# Patient Record
Sex: MALE | Race: WHITE
[De-identification: names, ages, dates, MRNs, and addresses within clinical notes are randomized per-mention and may not be internally consistent; named-entity substitution may affect disease eponyms.]

---

## 2020-02-05 ENCOUNTER — HOSPITAL ENCOUNTER (OUTPATIENT)
Dept: HOSPITAL 88 - RAD | Age: 70
End: 2020-02-05
Attending: INTERNAL MEDICINE
Payer: MEDICARE

## 2020-02-05 DIAGNOSIS — R06.02: Primary | ICD-10-CM

## 2020-02-05 DIAGNOSIS — R91.8: ICD-10-CM

## 2020-02-05 DIAGNOSIS — J44.9: ICD-10-CM

## 2020-02-05 LAB
ALBUMIN SERPL-MCNC: 2.7 G/DL (ref 3.5–5)
ALBUMIN/GLOB SERPL: 0.7 {RATIO} (ref 0.8–2)
ALP SERPL-CCNC: 86 IU/L (ref 40–150)
ALT SERPL-CCNC: 7 IU/L (ref 0–55)
ANION GAP SERPL CALC-SCNC: 18.8 MMOL/L (ref 8–16)
BASOPHILS # BLD AUTO: 0 10*3/UL (ref 0–0.1)
BASOPHILS NFR BLD AUTO: 0.3 % (ref 0–1)
BUN SERPL-MCNC: 27 MG/DL (ref 7–26)
BUN/CREAT SERPL: 7 (ref 6–25)
CALCIUM SERPL-MCNC: 8.8 MG/DL (ref 8.4–10.2)
CHLORIDE SERPL-SCNC: 96 MMOL/L (ref 98–107)
CO2 SERPL-SCNC: 26 MMOL/L (ref 22–29)
DEPRECATED NEUTROPHILS # BLD AUTO: 9.2 10*3/UL (ref 2.1–6.9)
EGFRCR SERPLBLD CKD-EPI 2021: 16 ML/MIN (ref 60–?)
EOSINOPHIL # BLD AUTO: 0.2 10*3/UL (ref 0–0.4)
EOSINOPHIL NFR BLD AUTO: 1.8 % (ref 0–6)
ERYTHROCYTE [DISTWIDTH] IN CORD BLOOD: 17.8 % (ref 11.7–14.4)
GLOBULIN PLAS-MCNC: 4.1 G/DL (ref 2.3–3.5)
GLUCOSE SERPLBLD-MCNC: 101 MG/DL (ref 74–118)
HCT VFR BLD AUTO: 31.7 % (ref 38.2–49.6)
HGB BLD-MCNC: 9.8 G/DL (ref 14–18)
LYMPHOCYTES # BLD: 2.1 10*3/UL (ref 1–3.2)
LYMPHOCYTES NFR BLD AUTO: 16.1 % (ref 18–39.1)
MCH RBC QN AUTO: 28.6 PG (ref 28–32)
MCHC RBC AUTO-ENTMCNC: 30.9 G/DL (ref 31–35)
MCV RBC AUTO: 92.4 FL (ref 81–99)
MONOCYTES # BLD AUTO: 1.3 10*3/UL (ref 0.2–0.8)
MONOCYTES NFR BLD AUTO: 9.8 % (ref 4.4–11.3)
NEUTS SEG NFR BLD AUTO: 70.8 % (ref 38.7–80)
PLATELET # BLD AUTO: 259 X10E3/UL (ref 140–360)
POTASSIUM SERPL-SCNC: 2.8 MMOL/L (ref 3.5–5.1)
RBC # BLD AUTO: 3.43 X10E6/UL (ref 4.3–5.7)
SODIUM SERPL-SCNC: 138 MMOL/L (ref 136–145)

## 2020-02-05 PROCEDURE — 80053 COMPREHEN METABOLIC PANEL: CPT

## 2020-02-05 PROCEDURE — 71046 X-RAY EXAM CHEST 2 VIEWS: CPT

## 2020-02-05 PROCEDURE — 86039 ANTINUCLEAR ANTIBODIES (ANA): CPT

## 2020-02-05 PROCEDURE — 36415 COLL VENOUS BLD VENIPUNCTURE: CPT

## 2020-02-05 PROCEDURE — 86021 WBC ANTIBODY IDENTIFICATION: CPT

## 2020-02-05 PROCEDURE — 85025 COMPLETE CBC W/AUTO DIFF WBC: CPT

## 2020-02-05 NOTE — DIAGNOSTIC IMAGING REPORT
Chest, 2 views,  2/5/2020.   

 



History: Shortness of breath, COPD.



Comparison: None available.



Findings: The cardiomediastinal silhouette and pulmonary vasculature are mildly

prominent. There is biapical pleural thickening with bilateral upper lobe

irregular linear opacities. Linear opacities are also present at the lung

bases. There is no evidence of pleural effusion. Right IJ tunneled dialysis

catheter is present. There are no acute osseous or soft tissue abnormalities. 



Impression: 

Mild cardiomegaly and vascular congestion. Bilateral upper lobe scarring and

bibasilar atelectasis are present.



Signed by: Vic Herndon on 2/5/2020 5:30 PM

## 2020-02-19 ENCOUNTER — HOSPITAL ENCOUNTER (OUTPATIENT)
Dept: HOSPITAL 88 - DX | Age: 70
End: 2020-02-19
Attending: INTERNAL MEDICINE
Payer: MEDICARE

## 2020-02-19 DIAGNOSIS — J44.9: ICD-10-CM

## 2020-02-19 DIAGNOSIS — R06.02: Primary | ICD-10-CM

## 2020-02-19 DIAGNOSIS — R47.02: ICD-10-CM

## 2020-02-19 LAB
ALBUMIN SERPL-MCNC: 2.8 G/DL (ref 3.5–5)
ALBUMIN/GLOB SERPL: 0.8 {RATIO} (ref 0.8–2)
ALP SERPL-CCNC: 86 IU/L (ref 40–150)
ALT SERPL-CCNC: 13 IU/L (ref 0–55)
ANION GAP SERPL CALC-SCNC: 14.3 MMOL/L (ref 8–16)
BASOPHILS # BLD AUTO: 0.1 10*3/UL (ref 0–0.1)
BASOPHILS NFR BLD AUTO: 0.7 % (ref 0–1)
BUN SERPL-MCNC: 29 MG/DL (ref 7–26)
BUN/CREAT SERPL: 12 (ref 6–25)
CALCIUM SERPL-MCNC: 9.4 MG/DL (ref 8.4–10.2)
CHLORIDE SERPL-SCNC: 101 MMOL/L (ref 98–107)
CO2 SERPL-SCNC: 31 MMOL/L (ref 22–29)
DEPRECATED NEUTROPHILS # BLD AUTO: 8 10*3/UL (ref 2.1–6.9)
EGFRCR SERPLBLD CKD-EPI 2021: 27 ML/MIN (ref 60–?)
EOSINOPHIL # BLD AUTO: 0.6 10*3/UL (ref 0–0.4)
EOSINOPHIL NFR BLD AUTO: 5.6 % (ref 0–6)
ERYTHROCYTE [DISTWIDTH] IN CORD BLOOD: 17.7 % (ref 11.7–14.4)
GLOBULIN PLAS-MCNC: 3.6 G/DL (ref 2.3–3.5)
GLUCOSE SERPLBLD-MCNC: 84 MG/DL (ref 74–118)
HCT VFR BLD AUTO: 29 % (ref 38.2–49.6)
HGB BLD-MCNC: 8.7 G/DL (ref 14–18)
LYMPHOCYTES # BLD: 1.3 10*3/UL (ref 1–3.2)
LYMPHOCYTES NFR BLD AUTO: 11.4 % (ref 18–39.1)
MCH RBC QN AUTO: 28.4 PG (ref 28–32)
MCHC RBC AUTO-ENTMCNC: 30 G/DL (ref 31–35)
MCV RBC AUTO: 94.8 FL (ref 81–99)
MONOCYTES # BLD AUTO: 1.3 10*3/UL (ref 0.2–0.8)
MONOCYTES NFR BLD AUTO: 11.4 % (ref 4.4–11.3)
NEUTS SEG NFR BLD AUTO: 69.8 % (ref 38.7–80)
PLATELET # BLD AUTO: 362 X10E3/UL (ref 140–360)
POTASSIUM SERPL-SCNC: 3.3 MMOL/L (ref 3.5–5.1)
RBC # BLD AUTO: 3.06 X10E6/UL (ref 4.3–5.7)
SODIUM SERPL-SCNC: 143 MMOL/L (ref 136–145)

## 2020-02-19 PROCEDURE — 80053 COMPREHEN METABOLIC PANEL: CPT

## 2020-02-19 PROCEDURE — 86039 ANTINUCLEAR ANTIBODIES (ANA): CPT

## 2020-02-19 PROCEDURE — 85025 COMPLETE CBC W/AUTO DIFF WBC: CPT

## 2020-02-19 PROCEDURE — 71046 X-RAY EXAM CHEST 2 VIEWS: CPT

## 2020-02-19 PROCEDURE — 36415 COLL VENOUS BLD VENIPUNCTURE: CPT

## 2020-02-19 PROCEDURE — 86021 WBC ANTIBODY IDENTIFICATION: CPT

## 2020-02-19 PROCEDURE — 74230 X-RAY XM SWLNG FUNCJ C+: CPT

## 2020-02-19 NOTE — DIAGNOSTIC IMAGING REPORT
EXAM:  MODIFIED BA. SWALLOW



DATE: 2/19/2020 11:37 AM  



Indication: Dysphasia



Fluoroscopy Time: 2.4 min.

Reference Air Kerma (Ka, r): 5.97 mGy.



COMPARISON: None



IMPRESSION:



Modified barium small was performed by the speech pathologist. The radiologist

was not present for the examination. Provided images demonstrate no evidence

for subglottic tracheal aspiration. Please refer to speech pathology report for

further details.



Signed by: Dr. Ry Madrid MD on 2/19/2020 2:21 PM

## 2020-02-19 NOTE — DIAGNOSTIC IMAGING REPORT
EXAM:  CHEST 2 VIEWS



DATE: 2/19/2020 11:37 AM  



INDICATION: Shortness breath, COPD 



COMPARISON: 2/5/2020



FINDINGS:

Right IJ tunnel dialysis catheter identified in stable position. 



Trachea is midline. Again identified is biapical pleural thickening/scarring.

There are stable appearing linear opacities identified within the lower lung

zones which may reflect atelectasis/scarring. There is no evidence for new

large focal consolidation, pneumothorax, or significant pleural effusion.



The cardiomediastinal remains prominent. Mediastinal contours are unremarkable.

No acute osseous abnormality is identified.





IMPRESSION:



No significant interval change identified from 2/5/2020.



Signed by: Dr. Ry Madrid MD on 2/19/2020 12:17 PM

## 2020-02-28 ENCOUNTER — HOSPITAL ENCOUNTER (OUTPATIENT)
Dept: HOSPITAL 88 - CT | Age: 70
End: 2020-02-28
Attending: INTERNAL MEDICINE
Payer: MEDICARE

## 2020-02-28 DIAGNOSIS — J18.9: Primary | ICD-10-CM

## 2020-02-28 PROCEDURE — 71250 CT THORAX DX C-: CPT

## 2020-03-02 NOTE — DIAGNOSTIC IMAGING REPORT
EXAM: CT Chest WITHOUT contrast

INDICATION:      

^[id number]

^[id number]

^PNEUMONIA  

COMPARISON: X-ray 2/19/2020



TECHNIQUE:

Chest was scanned utilizing a multidetector helical scanner from the lung apex

through the level of the adrenal glands without administration of IV contrast.

Absence of intravenous contrast decreases sensitivity for detection of

lymphadenopathy and vascular pathology. Coronal and sagittal reformations were

obtained. Routine protocol was performed.

     IV CONTRAST: None



COMPLICATIONS: None   



RADIATION DOSE: 

     Total DLP: 367 mGy*cm

     Estimated effective dose: (DLP x 0.014 x size factor) mSv

     CTDIvol has been reviewed. It is below the limits set by the Radiation

Protocol Committee (RPC).

     Dose modulation, iterative reconstruction, and/or weight based adjustment

of the mA/kV was utilized to reduce the radiation dose to as low as reasonably

achievable. 

           

FINDINGS:



LINES/ TUBES: Right internal jugular vein tunneled hemodialysis catheter

terminates in the right atrium.



LUNGS AND AIRWAYS: Advanced upper lobe predominant centrilobular emphysema.

Superimposed patchy mixed interstitial and alveolar opacities throughout the

upper lobes. Right lower lobe irregular nodular opacity with central cavitation

and/or bronchiectasis that measures up to 2.3 x 2.2 cm (image 99). Few small

calcified granulomas. Mild central bronchiectasis.



PLEURA: The pleural spaces are clear.



HEART AND MEDIASTINUM: Mild cardiomegaly. No pericardial effusion. Prominent

nonspecific mediastinal lymph nodes measure up to 1 cm in short axis. The noble

are suboptimally evaluated. Prominent bilateral axillary lymph nodes.

Dilatation of the right heart chambers.



UPPER ABDOMEN: Suboptimally evaluated due to lack of intravenous contrast.

Nonspecific hypodensities of the hepatic dome. 



BONES: Partially healed anterior right rib fractures.



SOFT TISSUES: Mild gynecomastia.



IMPRESSION:

1. Advanced pulmonary emphysema with upper lobe predominant airspace disease

suggestive of superimposed pneumonia.



2. Indeterminate right lower lobe 2.3 x 2.2 cm masslike opacity. Differential

considerations include neoplasm (such as squamous cell carcinoma) and

infection. Per Fleischner Society recommendations, further evaluation may be

performed with short-term CT follow-up, PET/CT, and/or percutaneous biopsy.



Signed by: Jaden Stephen MD on 3/2/2020 9:21 AM

## 2020-05-14 NOTE — DIAGNOSTIC IMAGING REPORT
EXAMINATION:  CHEST 2 VIEWS    



INDICATION: Pre-operative



COMPARISON: Chest CT 2/28/2020

     

FINDINGS:



LINES/TUBES:Right IJ tunneled hemodialysis catheter terminates just beyond the

superior cavoatrial junction.



LUNGS:The lungs are hyperinflated. Diffuse emphysematous changes and upper lung

scarring. Nodule seen on prior chest CT are beyond the resolution of this

radiograph. No focal pneumonia.



PLEURA:No pleural effusion or pneumothorax.



MEDIASTINUM:The cardiomediastinal silhouette appears normal in size and shape.

Atherosclerotic calcifications of the thoracic aorta.



BONES/SOFT TISSUES:No acute osseous injury.



ABDOMEN:No free air under the diaphragm.





IMPRESSION: 

Hyperinflated lungs with emphysematous changes and scarring. No focal pneumonia

or pulmonary edema.



Signed by: Thai Borges MD on 5/14/2020 11:17 AM

## 2020-05-18 ENCOUNTER — HOSPITAL ENCOUNTER (OUTPATIENT)
Dept: HOSPITAL 88 - OR | Age: 70
Discharge: HOME | End: 2020-05-18
Attending: THORACIC SURGERY (CARDIOTHORACIC VASCULAR SURGERY)
Payer: MEDICARE

## 2020-05-18 VITALS — DIASTOLIC BLOOD PRESSURE: 75 MMHG | SYSTOLIC BLOOD PRESSURE: 114 MMHG

## 2020-05-18 DIAGNOSIS — Z11.59: ICD-10-CM

## 2020-05-18 DIAGNOSIS — Z01.812: ICD-10-CM

## 2020-05-18 DIAGNOSIS — M54.9: ICD-10-CM

## 2020-05-18 DIAGNOSIS — I42.9: ICD-10-CM

## 2020-05-18 DIAGNOSIS — I12.0: Primary | ICD-10-CM

## 2020-05-18 DIAGNOSIS — Z86.73: ICD-10-CM

## 2020-05-18 DIAGNOSIS — Z99.2: ICD-10-CM

## 2020-05-18 DIAGNOSIS — I25.10: ICD-10-CM

## 2020-05-18 DIAGNOSIS — Z01.810: ICD-10-CM

## 2020-05-18 DIAGNOSIS — J44.9: ICD-10-CM

## 2020-05-18 DIAGNOSIS — K21.9: ICD-10-CM

## 2020-05-18 DIAGNOSIS — N18.6: ICD-10-CM

## 2020-05-18 DIAGNOSIS — Z87.01: ICD-10-CM

## 2020-05-18 DIAGNOSIS — Z87.891: ICD-10-CM

## 2020-05-18 LAB
ANION GAP SERPL CALC-SCNC: 14.1 MMOL/L (ref 8–16)
BASOPHILS # BLD AUTO: 0.1 10*3/UL (ref 0–0.1)
BASOPHILS NFR BLD AUTO: 0.4 % (ref 0–1)
BUN SERPL-MCNC: 53 MG/DL (ref 7–26)
BUN/CREAT SERPL: 11 (ref 6–25)
CALCIUM SERPL-MCNC: 8.7 MG/DL (ref 8.4–10.2)
CHLORIDE SERPL-SCNC: 104 MMOL/L (ref 98–107)
CO2 SERPL-SCNC: 22 MMOL/L (ref 22–29)
DEPRECATED APTT PLAS QN: 32.1 SECONDS (ref 23.8–35.5)
DEPRECATED INR PLAS: 0.87
DEPRECATED NEUTROPHILS # BLD AUTO: 9.5 10*3/UL (ref 2.1–6.9)
EGFRCR SERPLBLD CKD-EPI 2021: 12 ML/MIN (ref 60–?)
EOSINOPHIL # BLD AUTO: 0.3 10*3/UL (ref 0–0.4)
EOSINOPHIL NFR BLD AUTO: 1.9 % (ref 0–6)
ERYTHROCYTE [DISTWIDTH] IN CORD BLOOD: 14 % (ref 11.7–14.4)
GLUCOSE SERPLBLD-MCNC: 84 MG/DL (ref 74–118)
HCT VFR BLD AUTO: 39.1 % (ref 38.2–49.6)
HGB BLD-MCNC: 12.5 G/DL (ref 14–18)
LYMPHOCYTES # BLD: 2.1 10*3/UL (ref 1–3.2)
LYMPHOCYTES NFR BLD AUTO: 15.8 % (ref 18–39.1)
MCH RBC QN AUTO: 31.3 PG (ref 28–32)
MCHC RBC AUTO-ENTMCNC: 32 G/DL (ref 31–35)
MCV RBC AUTO: 98 FL (ref 81–99)
MONOCYTES # BLD AUTO: 1.4 10*3/UL (ref 0.2–0.8)
MONOCYTES NFR BLD AUTO: 10.2 % (ref 4.4–11.3)
NEUTS SEG NFR BLD AUTO: 70.4 % (ref 38.7–80)
PLATELET # BLD AUTO: 291 X10E3/UL (ref 140–360)
POTASSIUM SERPL-SCNC: 4.1 MMOL/L (ref 3.5–5.1)
PROTHROMBIN TIME: 12.3 SECONDS (ref 11.9–14.5)
RBC # BLD AUTO: 3.99 X10E6/UL (ref 4.3–5.7)
SODIUM SERPL-SCNC: 136 MMOL/L (ref 136–145)

## 2020-05-18 PROCEDURE — 36415 COLL VENOUS BLD VENIPUNCTURE: CPT

## 2020-05-18 PROCEDURE — 85610 PROTHROMBIN TIME: CPT

## 2020-05-18 PROCEDURE — 85730 THROMBOPLASTIN TIME PARTIAL: CPT

## 2020-05-18 PROCEDURE — 36819 AV FUSE UPPR ARM BASILIC: CPT

## 2020-05-18 PROCEDURE — 85025 COMPLETE CBC W/AUTO DIFF WBC: CPT

## 2020-05-18 PROCEDURE — 71046 X-RAY EXAM CHEST 2 VIEWS: CPT

## 2020-05-18 PROCEDURE — 87635 SARS-COV-2 COVID-19 AMP PRB: CPT

## 2020-05-18 PROCEDURE — 80048 BASIC METABOLIC PNL TOTAL CA: CPT

## 2020-05-18 NOTE — XMS REPORT
Patient Summary Document

                             Created on: 2020



TY PORTER

External Reference #: [id number]

: 1950

Sex: Male



Demographics





                          Address                   [address] [place]  [address]

 

                          Home Phone                [phone]

 

                          Preferred Language        English

 

                          Marital Status            Unknown

 

                          Worship Affiliation     Unknown

 

                          Race                      Unknown

 

                                        Additional Race(s)  

 

                          Ethnic Group              Unknown





Author





                          Author                    [facility]

t

 

                          Organization              [facility]

 

                          Address                   [address]

[place]  [address]



 

                          Phone                     Unavailable







Support





                Name            Relationship    Address         Phone

 

                    KD PORTER     PRS                 [address] [place], [place]  [id number] [phone]

 

                    NONE,  OTHER        PRS                 [address] [place]  [address] [phone]







Care Team Providers





                    Care Team Member Name Role                Phone

 

                    MATEO ALMONTE     Attphys             Unavailable

 

                    THEE ANTONY       Attyuan             Unavailable







Payers





           Payer Name Policy Type Policy Number Effective Date Expiration Date S

ource







Problems

This patient has no known problems.



Allergies, Adverse Reactions, Alerts





        Allergy Name Allergy Type Status  Severity Reaction(s) Onset Date Inacti

ve Date 

Treating Clinician        Comments                  Source

 

       azithromycin DA     Active             2020 00:00:00             

         [facility]

 

       chlorhexidine DA     Active             2020 00:00:00            

          [facility]

 

       azithromycin DA     Active             2019 00:00:00             

         [facility]

 

       chlorhexidine DA     Active             2019 00:00:00            

          [facility]

 

       chlorhexidine DA     Active MO            2019 00:00:00            

          [facility]

 

       azithromycin DA     Active             2019-10-26 00:00:00             

         [facility]

 

       azithromycin DA     Active             2019 00:00:00             

         [facility]

 

       No Known Allergies DA     Active U             2019 00:00:00       

               [facility]

 

       No Known Allergies DA     Active U             2013 00:00:00       

               [facility]







Medications

This patient has no known medications.



Procedures

This patient has no known procedures.



Results





           Test Description Test Time  Test Comments Results    Result Comments 

Source

 

                CHEST 2 VIEWS   2020 11:15:00                             

                                

                                         [facility]      
                 [address], [place] [address]      
Patient Name: TY PORTER                                MR #: [id number]     
            : 1950                                   Age/Sex: 69/M  
Acct #: [id number]                              Req #: [id number]  Adm 
Physician:                                                      Ordered by: 
MATEO ALMONTE MD                         Report #: [id number]        Location: 
OR                                      Room/Bed:                   
________________________________________________________________________________

___________________    Procedure: [id number] DX/CHEST 2 VIEWS  Exam Date: 
20                            Exam Time: 904                             
                REPORT STATUS: Signed    EXAMINATION:  CHEST 2 VIEWS          
INDICATION: Pre-operative      COMPARISON: Chest CT 2020           
FINDINGS:      LINES/TUBES:Right IJ tunneled hemodialysis catheter terminates 
just beyond the   superior cavoatrial junction.      LUNGS:The lungs are 
hyperinflated. Diffuse emphysematous changes and upper lung   scarring. Nodule 
seen on prior chest CT are beyond the resolution of this   radiograph. No focal 
pneumonia.      PLEURA:No pleural effusion or pneumothorax.      MEDIASTINUM:The
cardiomediastinal silhouette appears normal in size and shape.   Atherosclerotic
calcifications of the thoracic aorta.      BONES/SOFT TISSUES:No acute osseous 
injury.      ABDOMEN:No free air under the diaphragm.         IMPRESSION:    
Hyperinflated lungs with emphysematous changes and scarring. No focal pneumonia 
 or pulmonary edema.      Signed by: Saida Mayers MD on 2020 11:17 AM       
Dictated By: SAIDA MAYERS MD  Electronically Signed By: SAIDA MAYERS MD on 20  Transcribed By: RIKA on 20       COPY TO:   MATEO ALMONTE MD
                                                     

 

                CT CHEST WO     2020 09:13:00                             

                                  

                                       [place] [address]      
Patient Name: TY PORTER                                   MR #: [id number]  
                  : 1950                                   Age/Sex: 
69/M  Acct #: [id number]                              Req #: [id number]  [facility] 
Physician:                                                      Ordered by: 
THEE ANTONY MD                            Report #: [id number]        Location: 
CT                                      Room/Bed:                     
________________________________________________________________________________

___________________    Procedure: [id number] CT/CT CHEST WO  Exam Date: 20 
                          Exam Time:                                        
      REPORT STATUS: Signed    EXAM: CT Chest WITHOUT contrast   INDICATION:    
     2020    PNEUMONIA     COMPARISON: X-ray 2020      
TECHNIQUE:   Chest was scanned utilizing a multidetector helical scanner from 
the lung apex   through the level of the adrenal glands without administration 
of IV contrast.   Absence of intravenous contrast decreases sensitivity for 
detection of   lymphadenopathy and vascular pathology. Coronal and sagittal 
reformations were   obtained. Routine protocol was performed.        IV 
CONTRAST: None      COMPLICATIONS: None         RADIATION DOSE:         Total 
DLP: 367 mGy*cm        Estimated effective dose: (DLP x 0.014 x size factor) mSv
       CTDIvol has been reviewed. It is below the limits set by the Radiation   
Protocol Committee (RPC).        Dose modulation, iterative reconstruction, 
and/or weight based adjustment   of the mA/kV was utilized to reduce the 
radiation dose to as low as reasonably   achievable.                  FINDINGS: 
    LINES/ TUBES: Right internal jugular vein tunneled hemodialysis catheter   
terminates in the right atrium.      LUNGS AND AIRWAYS: Advanced upper lobe 
predominant centrilobular emphysema.   Superimposed patchy mixed interstitial 
and alveolar opacities throughout the   upper lobes. Right lower lobe irregular 
nodular opacity with central cavitation   and/or bronchiectasis that measures up
to 2.3 x 2.2 cm (image 99). Few small   calcified granulomas. Mild central 
bronchiectasis.      PLEURA: The pleural spaces are clear.      HEART AND 
MEDIASTINUM: Mild cardiomegaly. No pericardial effusion. Prominent   nonspecific
mediastinal lymph nodes measure up to 1 cm in short axis. The noble   are 
suboptimally evaluated. Prominent bilateral axillary lymph nodes.   Dilatation 
of the right heart chambers.      UPPER ABDOMEN: Suboptimally evaluated due to 
lack of intravenous contrast.   Nonspecific hypodensities of the hepatic dome.  
    BONES: Partially healed anterior right rib fractures.      SOFT TISSUES: 
Mild gynecomastia.      IMPRESSION:   1. Advanced pulmonary emphysema with upper
lobe predominant airspace disease   suggestive of superimposed pneumonia.      
2. Indeterminate right lower lobe 2.3 x 2.2 cm masslike opacity. Differential   
considerations include neoplasm (such as squamous cell carcinoma) and   
infection. Per Fleischner Society recommendations, further evaluation may be   
performed with short-term CT follow-up, PET/CT, and/or percutaneous biopsy.     
Signed by: Jaden Covarrubias MD on 3/2/2020 9:21 AM        Dictated By: JADEN COVARRUBIAS MD  Electronically Signed By: JADEN COVARRUBIAS MD on 20  
Transcribed By: RIKA on 20       COPY TO:   THEE ANTONY MD BA. SWALLOW 2020 14:19:00                        

                              

                                                [place] [address]      Patient Name: TY PORTER                                   MR 
#: [id number]                     : 1950                               
   Age/Sex: 69/M  Acct #: [id number]                              Req #: [id number]
[id number]  Adm Physician:                                                      
Ordered by: THEE ANTONY MD                            Report #: [id number]      
 Location: DX                                      Room/Bed:                    
 
________________________________________________________________________________

___________________    Procedure: [id number] DX/MODIFIED BA. SWALLOW  Exam Date: 
                           Exam Time:                                           
   REPORT STATUS: Signed    EXAM:  MODIFIED BA. SWALLOW      DATE: 2020 
11:37 AM        Indication: Dysphasia      Fluoroscopy Time: 2.4 min.   
Reference Air Kerma (Ka, r): 5.97 mGy.      COMPARISON: None      IMPRESSION:   
  Modified barium small was performed by the speech pathologist. The radiologist
  was not present for the examination. Provided images demonstrate no evidence  
for subglottic tracheal aspiration. Please refer to speech pathology report for 
 further details.      Signed by: Dr. Ry Madrid MD on 2020 2:21 PM     
  Dictated By: RY MADRID MD  Electronically Signed By: RY MADRID MD on 
20 1421  Transcribed By: RIKA on 20 1421       COPY TO:   THEE QUIJANO MD                                        

 

                CHEST 2 VIEWS   2020 12:15:00                             

                                

                                         [place] [address]      
Patient Name: TY PORTER                                   MR #: [id number]    
                : 1950                                   Age/Sex: 69/M 
Acct #: [id number]                              Req #: [id number]  Adm 
Physician:                                                      Ordered by: 
THEE ANTONY MD                            Report #: [id number]        Location: 
DX                                      Room/Bed:                     
________________________________________________________________________________

___________________    Procedure: [id number] DX/CHEST 2 VIEWS  Exam Date: 
20                            Exam Time: 1135                             
                REPORT STATUS: Signed    EXAM:  CHEST 2 VIEWS      DATE: 
2020 11:37 AM        INDICATION: Shortness breath, COPD       COMPARISON: 
2020      FINDINGS:   Right IJ tunnel dialysis catheter identified in stable
position.       Trachea is midline. Again identified is biapical pleural thick
ening/scarring.   There are stable appearing linear opacities identified within 
the lower lung   zones which may reflect atelectasis/scarring. There is no 
evidence for new   large focal consolidation, pneumothorax, or significant 
pleural effusion.      The cardiomediastinal remains prominent. Mediastinal 
contours are unremarkable.   No acute osseous abnormality is identified.        
IMPRESSION:      No significant interval change identified from 2020.      
Signed by: Dr. Ry Madrid MD on 2020 12:17 PM        Dictated By: RY MADRID MD  Electronically Signed By: RY MADRID MD on 20  
Transcribed By: RIKA on 20       COPY TO:   THEE ANTONY MD        
                                                     

 

                CHEST 2 VIEWS   2020 17:27:00                             

                                

                                         [place] [address]      
Patient Name: TY PORTER                                   MR #: [id number]    
                : 1950                                   Age/Sex: 69/M 
Acct #: [id number]                              Req #: [id number]  Adm 
Physician:                                                      Ordered by: 
THEE ANTONY MD                            Report #: [id number]        Location: 
[facility]                                     Room/Bed:                     
________________________________________________________________________________

___________________    Procedure: [id number] DX/CHEST 2 VIEWS  Exam Date: 
20                            Exam Time: 1635                             
                REPORT STATUS: Signed    Chest, 2 views,  2020.             
History: Shortness of breath, COPD.      Comparison: None available.      
Findings: The cardiomediastinal silhouette and pulmonary vasculature are mildly 
 prominent. There is biapical pleural thickening with bilateral upper lobe   
irregular linear opacities. Linear opacities are also present at the lung   
bases. There is no evidence of pleural effusion. Right IJ tunneled dialysis   
catheter is present. There are no acute osseous or soft tissue abnormalities.   
   Impression:    Mild cardiomegaly and vascular congestion. Bilateral upper 
lobe scarring and   bibasilar atelectasis are present.      Signed by: Vic Dunn on 2020 5:30 PM        Dictated By: VIC DUNN MD  Electronically 
Signed By: VIC DUNN MD on 20  Transcribed By: RIKA on 
20       COPY TO:   THEE ANTONY MD                                   

  

 

                    COMPREHENSIVE METABOLIC PANEL 2020 08:09:00   

 

                                        Test Item

 

             SODIUM (test code = NA) 140 mEq/L    134-147      N             

 

             POTASSIUM (test code = K) 3.4 mEq/L    3.4-5.0      N             

 

             CHLORIDE (test code = CL) 104 mEq/L    100-108      N             

 

             CARBON DIOXIDE (test code = CO2) 30 mEq/L     21-33        N       

      

 

             ANION GAP (test code = GAP) 9            0-20         N            

 

 

             GLUCOSE (test code = GLU) 88 mg/dL            N             

 

             BLOOD UREA NITROGEN (test code = BUN) 16 mg/dL     7-18         N  

           

 

             GLOMERULAR FILTRATION RATE (test code = GFR) 20.8         70-80    

    L            Units of measure = 

ml/min/1.73 m2

 

             CREATININE (test code = CREAT) 3.0 mg/dL    0.6-1.3      H         

    

 

             TOTAL PROTEIN (test code = PROT) 5.7 g/dL     6.4-8.2      L       

      

 

             ALBUMIN (test code = ALB) 2.60 g/dL    3.4-5.0      L             

 

             CALCIUM (test code = CA) 8.6 mg/dL    8.0-10.5     N             

 

             BILIRUBIN TOTAL (test code = BILT) 0.4 MG/DL    <1.5         N     

        

 

             SGOT/AST (test code = AST) 6 IUnit/L    15-37        L             

 

             SGPT/ALT (test code = ALT) 10 IUnit/L   15-65        L             

 

             ALKALINE PHOSPHATASE TOTAL (test code = ALKP) 55 IUnit/L     

     N             





CBC W/AUTO [id number] 08:01:00* 



             Test Item    Value        Reference Range Interpretation Comments

 

             WHITE BLOOD CELL (test code = WBC) 12.89 x10 3/uL 4.5-11.0     H   

          

 

             RED BLOOD CELL (test code = RBC) 3.10 x10 6/uL 4.00-5.60    L      

       

 

             HEMOGLOBIN (test code = HGB) 8.9 g/dL     12.5-16.9    L           

  

 

             HEMATOCRIT (test code = HCT) 29.9 %       37.5-50.7    L           

  

 

             MEAN CELL VOLUME (test code = MCV) 96.5 fL      81.0-99.0    N     

        

 

             MEAN CELL HGB (test code = MCH) 28.7 pg      27.0-33.0    N        

     

 

             MEAN CELL HGB CONCETRATION (test code = MCHC) 29.8 g/dL    33.0-37.

0    L             

 

             RED CELL DISTRIBUTION WIDTH CV (test code = RDW) 17.2 %       11.5-

14.5    H             

 

             RED CELL DISTRIBUTION WIDTH SD (test code = RDW-SD) 59.3 fL      37

.0-54.0    H             

 

             PLATELET COUNT (test code = PLT) 187 x10 3/uL 150-400      N       

      

 

             MEAN PLATELET VOLUME (test code = MPV) 10.0 fL      7.0-9.0      H 

            

 

             NEUTROPHIL % (test code = NT%) 53.4 %       56.0-77.0    L         

    

 

             IMMATURE GRANULOCYTE % (test code = IG%) 1.1 %        0.0-2.0      

N             

 

             LYMPHOCYTE % (test code = LY%) 23.0 %       14.0-32.0    N         

    

 

             MONOCYTE % (test code = MO%) 11.4 %       4.8-9.0      H           

  

 

             EOSINOPHIL % (test code = EO%) 10.2 %       0.3-3.7      H         

    

 

             BASOPHIL % (test code = BA%) 0.9 %        0.0-2.0      N           

  

 

             NUCLEATED RBC % (test code = NRBC%) 0.0 %        0-0          N    

         

 

             NEUTROPHIL # (test code = NT#) 6.90 x10 3/uL 2.0-7.6      N        

     

 

             IMMATURE GRANULOCYTE # (test code = IG#) 0.14 x10 3/uL 0.00-0.03   

 H             

 

             LYMPHOCYTE # (test code = LY#) 2.96 x10 3/uL 1.0-3.8      N        

     

 

             MONOCYTE # (test code = MO#) 1.47 x10 3/uL 0.1-0.8      H          

   

 

             EOSINOPHIL # (test code = EO#) 1.31 x10 3/uL 0.0-0.2      H        

     

 

             BASOPHIL # (test code = BA#) 0.11 x10 3/uL 0.0-0.2      N          

   

 

             NUCLEATED RBC # (test code = NRBC#) 0.00 x10 3/uL 0.0-0.1      N   

          

 

             MANUAL DIFF REQUIRED (test code = MDIFF) NO                        

              





ARTERIAL BLOOD [id number] 15:13:00* 



             Test Item    Value        Reference Range Interpretation Comments

 

             ARTERIAL BLOOD GAS PH (test code = PHA) 7.324        7.35-7.45    L

             

 

             ARTERIAL BLOOD GAS PCO2 (test code = PCO2A) 54.9 mmHg    35-45     

   H             

 

             ARTERIAL BLOOD GAS PO2 (test code = PO2A) 413 mmHg           

 H             

 

             BICARBONATE TOTAL HCO3 (test code = HCO3) 28.5 mmol/L  22.0-26.0   

 H             

 

             BASE EXCESS (test code = URIEL) 2.0 mmol/L   -4-4         N          

   

 

             ABG O2 SATURATION (test code = SATA) 100 %               N   

          

 

             FIO2 (test code = FIO2A) 100 %                                   

 

             ABG DELIVERY (test code = MATTY) Vent                               

     

 

             ABG VENT MODE (test code = MODEA) AC v con                         

       

 

             ABG VENT RESP RATE (test code = RRA) 12 /MIN                       

          

 

             ABG TIDAL VOLUME (test code = TVA) 600 ml                          

        

 

             ABG PRESSURE SUPPORT (test code = PSABG) 10 cmH2O                  

             Performed by certified 

 at  [facility]

 

             ABG TEMPERATURE (test code = TEMPA) 98.6 F                         

         

 

             ABG SITE (test code = SITEA) Art line                              

  

 

             PREDICTED AA GRADIENT (test code = AP) 168                         

            

 

             PREDICTED PO2 (test code = OP) 479                                 

    

 

             a/A RATIO (test code = RATIO) 0.64                                 

   

 

             TCO2 ARTERIAL (test code = TCO2A) 30                               

       

 

             A-A GRADIENT (test code = AAGRADE) 234                             

        





CBC W/AUTO [id number] 09:26:00* 



             Test Item    Value        Reference Range Interpretation Comments

 

             WHITE BLOOD CELL (test code = WBC) 13.81 x10 3/uL 4.5-11.0     H   

          

 

             RED BLOOD CELL (test code = RBC) 3.12 x10 6/uL 4.00-5.60    L      

       

 

             HEMOGLOBIN (test code = HGB) 9.3 g/dL     12.5-16.9    L           

  

 

             HEMATOCRIT (test code = HCT) 30.2 %       37.5-50.7    L           

  

 

             MEAN CELL VOLUME (test code = MCV) 96.8 fL      81.0-99.0    N     

        

 

             MEAN CELL HGB (test code = MCH) 29.8 pg      27.0-33.0    N        

     

 

             MEAN CELL HGB CONCETRATION (test code = MCHC) 30.8 g/dL    33.0-37.

0    L             

 

             RED CELL DISTRIBUTION WIDTH CV (test code = RDW) 17.4 %       11.5-

14.5    H             

 

             RED CELL DISTRIBUTION WIDTH SD (test code = RDW-SD) 62.0 fL      37

.0-54.0    H             

 

             PLATELET COUNT (test code = PLT) 192 x10 3/uL 150-400      N       

      

 

             MEAN PLATELET VOLUME (test code = MPV) 10.0 fL      7.0-9.0      H 

            

 

             NEUTROPHIL % (test code = NT%) 58.1 %       56.0-77.0    N         

    

 

             IMMATURE GRANULOCYTE % (test code = IG%) 1.4 %        0.0-2.0      

N             

 

             LYMPHOCYTE % (test code = LY%) 22.8 %       14.0-32.0    N         

    

 

             MONOCYTE % (test code = MO%) 7.2 %        4.8-9.0      N           

  

 

             EOSINOPHIL % (test code = EO%) 9.8 %        0.3-3.7      H         

    

 

             BASOPHIL % (test code = BA%) 0.7 %        0.0-2.0      N           

  

 

             NUCLEATED RBC % (test code = NRBC%) 0.0 %        0-0          N    

         

 

             NEUTROPHIL # (test code = NT#) 8.02 x10 3/uL 2.0-7.6      H        

     

 

             IMMATURE GRANULOCYTE # (test code = IG#) 0.19 x10 3/uL 0.00-0.03   

 H             

 

             LYMPHOCYTE # (test code = LY#) 3.15 x10 3/uL 1.0-3.8      N        

     

 

             MONOCYTE # (test code = MO#) 1.00 x10 3/uL 0.1-0.8      H          

   

 

             EOSINOPHIL # (test code = EO#) 1.36 x10 3/uL 0.0-0.2      H        

     

 

             BASOPHIL # (test code = BA#) 0.09 x10 3/uL 0.0-0.2      N          

   

 

             NUCLEATED RBC # (test code = NRBC#) 0.00 x10 3/uL 0.0-0.1      N   

          

 

             MANUAL DIFF REQUIRED (test code = MDIFF) NO                        

             SLIDE REVIEWED, CONSISTENT WITH 

AUTO DIFF.





CBC W/AUTO [id number] 07:54:00* 



             Test Item    Value        Reference Range Interpretation Comments

 

             WHITE BLOOD CELL (test code = WBC) 13.81 x10 3/uL 4.5-11.0     H   

          

 

             RED BLOOD CELL (test code = RBC) 3.12 x10 6/uL 4.00-5.60    L      

       

 

             HEMOGLOBIN (test code = HGB) 9.3 g/dL     12.5-16.9    L           

  

 

             HEMATOCRIT (test code = HCT) 30.2 %       37.5-50.7    L           

  

 

             MEAN CELL VOLUME (test code = MCV) 96.8 fL      81.0-99.0    N     

        

 

             MEAN CELL HGB (test code = MCH) 29.8 pg      27.0-33.0    N        

     

 

             MEAN CELL HGB CONCETRATION (test code = MCHC) 30.8 g/dL    33.0-37.

0    L             

 

             RED CELL DISTRIBUTION WIDTH CV (test code = RDW) 17.4 %       11.5-

14.5    H             

 

             RED CELL DISTRIBUTION WIDTH SD (test code = RDW-SD) 62.0 fL      37

.0-54.0    H             

 

             PLATELET COUNT (test code = PLT) 192 x10 3/uL 150-400      N       

      

 

             MEAN PLATELET VOLUME (test code = MPV) 10.0 fL      7.0-9.0      H 

            

 

             NEUTROPHIL % (test code = NT%)  %           56.0-77.0              

    

 

             LYMPHOCYTE % (test code = LY%)  %           14.0-32.0              

    

 

             NEUTROPHIL # (test code = NT#)  x10 3/uL    2.0-7.6                

    

 

             LYMPHOCYTE # (test code = LY#)  x10 3/uL    1.0-3.8                

    

 

             MANUAL DIFF REQUIRED (test code = MDIFF)                           

              





BASIC METABOLIC [id number] 07:38:00* 



             Test Item    Value        Reference Range Interpretation Comments

 

             SODIUM (test code = NA) 137 mEq/L    134-147      N             

 

             POTASSIUM (test code = K) 4.2 mEq/L    3.4-5.0      N             

 

             CHLORIDE (test code = CL) 101 mEq/L    100-108      N             

 

             CARBON DIOXIDE (test code = CO2) 24 mEq/L     21-33        N       

      

 

             ANION GAP (test code = GAP) 16           0-20         N            

 

 

             GLUCOSE (test code = GLU) 74 mg/dL            N             

 

             BLOOD UREA NITROGEN (test code = BUN) 44 mg/dL     7-18         H  

           

 

             GLOMERULAR FILTRATION RATE (test code = GFR) 10.8         70-80    

    L            Units of measure = 

ml/min/1.73 m2

 

             CREATININE (test code = CREAT) 5.3 mg/dL    0.6-1.3      H         

    

 

             CALCIUM (test code = CA) 8.7 mg/dL    8.0-10.5     N             





[id number] 07:38:00* 



             Test Item    Value        Reference Range Interpretation Comments

 

             PHOSPHOROUS (test code = PHOS) 4.3 MG/DL    2.5-4.9                

    





[id number] 07:38:00* 



             Test Item    Value        Reference Range Interpretation Comments

 

             MAGNESIUM (test code = MAG) 1.90 mg/dL   1.8-2.4      N            

 





B-TYPE NATRIURETIC [id number] 07:10:00* 



             Test Item    Value        Reference Range Interpretation Comments

 

             B-TYPE NATRIURETIC PEPTIDE (test code = BNP) 1702.7 PG/ML 0-100    

    H             





BASIC METABOLIC [id number] 06:32:00* 



             Test Item    Value        Reference Range Interpretation Comments

 

             SODIUM (test code = NA) 142 mEq/L    134-147      N             

 

             POTASSIUM (test code = K) 3.5 mEq/L    3.4-5.0      N             

 

             CHLORIDE (test code = CL) 108 mEq/L    100-108      N             

 

             CARBON DIOXIDE (test code = CO2) 25 mEq/L     21-33        N       

      

 

             ANION GAP (test code = GAP) 13           0-20         N            

 

 

             GLUCOSE (test code = GLU) 77 mg/dL            N             

 

             BLOOD UREA NITROGEN (test code = BUN) 31 mg/dL     7-18         H  

           

 

             GLOMERULAR FILTRATION RATE (test code = GFR) 16.3         70-80    

    L            Units of measure = 

ml/min/1.73 m2

 

             CREATININE (test code = CREAT) 3.7 mg/dL    0.6-1.3      H         

    

 

             CALCIUM (test code = CA) 7.6 mg/dL    8.0-10.5     L             





[id number] 06:32:00* 



             Test Item    Value        Reference Range Interpretation Comments

 

             PHOSPHOROUS (test code = PHOS) 3.0 MG/DL    2.5-4.9                

    





HGB   [id number] 06:17:00* 



             Test Item    Value        Reference Range Interpretation Comments

 

             HEMOGLOBIN (test code = HGB) 8.9 g/dL     12.5-16.9    L           

  

 

             HEMATOCRIT (test code = HCT) 29.3 %       37.5-50.7    L           

  





- XR CHEST 1 -03-68 11:52:00 FAX: Jonny Lerner MD   [phone]
   [place]:   St: ADM----------
---------------------------------------------------------------------  Name:   TY DOUGLAS                    [facility]                    : [birth year]  Age/S: 69/M           [address] [place] [address]         Unit #: [id number]    
 Loc: [address] [address]                 Phys: Jonny Love MD   
                                              Acct: [id number] Dis Date:      
        Status: ADM IN                                 PHONE #: [phone]    
Exam Date:     2020     1144                   FAX #: [phone]     
Reason: PLEURAL EFFUSION                                   EXAMS:               
                               CPT CODE:      [id number] XR CHEST 1 V            
                  86934                    Study:  - XR CHEST 1 V 2020 1
0:34 AM               Patient Name: TY PORTER MR: [id number]       : ; Age: 69 years  y/o Male       Ordering Physician: Jonny Love MD     
         Clinical Indication: PLEURAL EFFUSION               Comparison: 2020               FINDINGS               LUNGS: Reticular opacities in bi
lateral upper lungs.  Hazy airspace       opacities bilaterally.               H
EART AND MEDIASTINUM: Mildly enlarged heart.               LINES: The life suppo
rt lines and tubes appear unchanged.               OSSEOUS STRUCTURES: Mild spin
al degenerative change without fracture,       dislocation, or focal osseous les
ion.               OTHER: None.                         IMPRESSION:             
               1.  No important interval change.         2.  Mild to moderate p
ulmonary edema with reticular opacities         suggesting underlying chronic naomi
ng disease.                                       SL: [id number]          ** E
lectronically Signed by CRAIG Ortez on 2020 at 1152 **              
       Reported and signed by: Ty Ortez M.D.     CC: Jonny Love MD     
                                                                                
                                Technologist: RT Joya(R)             
                    Trnscrd Date/Time/By: 2020 (7745) : By: [email or web address]    
     Orig Print D/T: S: 2020 (4897)                         PAGE  1       
               Signed Report                               - US ABDOMEN LTD
2020 18:14:00  Name: TY PORTER                     [facility]    
               : 1950 Age/S: 69  / M         [address] [place] [address] Bl   
     Unit #: [id number]     Loc:               [facility] [place] [address]                
Phys: Sharif,Roozbeh MD                                                  Acct: 
[id number]  Dis Date:               Status: ADM IN                            
     PHONE #: [phone]     Exam Date: 2020  1804                     
FAX #: [phone]      Reason: GALLBLADDER PAIN                               
    EXAMS:                                               CPT CODE:      
[id number] US ABDOMEN LTD                             [address]                    
ULTRASOUND LIMITED ABDOMEN, RIGHT UPPER QUADRANT               INDICATION: 
GALLBLADDER PAIN.               TECHNIQUE: Grayscale and Doppler ultrasound of 
the right upper       quadrant abdomen was performed.               COMPARISONS:
CT chest 2020               FINDINGS:               The liver is normal.  
There is hepatopedal flow in the portal vein.               The common bile duct
measures 0.8 cm diameter.               The gallbladder is distended up to 4.8 
cm transverse diameter.  There       is no gallstone or evidence of 
cholecystitis.               The right kidney measures 8.4 cm length.  There are
benign-appearing       cysts in the right kidney, the largest measures 1.3 x 1.2
x 1.1 cm. No       additional follow-up is required for these lesions.          
       There is a right pleural effusion.               The imaged abdominal 
aorta and inferior vena cava appear normal as       visualized.               
The pancreas is obscured due to shadowing from bowel.                 
IMPRESSION:                   1.  The common bile duct is mildly prominent 
measuring 0.8 cm         diameter.  Consider MRCP to evaluate further and 
exclude the         possibility of obstruction.         2.  No evidence of acute
cholecystitis or gallstones.           3.  There is a right pleural effusion.   
                ** Electronically Signed by HARRY Fung **            ** 
          on 2020 at 181            **                      Reported and 
signed by: Amilcar Fung D.O.         PAGE  1                       Signed 
Report                    (CONTINUED)   Name: TY PORTER                     
[facility]                    : 1950 Age/S: 69  / M         [address] [place] [address]         Unit #: [id number]     Loc:               [place] [address]                 Phys: Sharif,Roozbeh MD                               
                  Acct: [id number]  Dis Date:               Status: ADM IN    
                             PHONE #: [phone]     Exam Date: 2020                     FAX #: [phone]      Reason: GALLBLADDER PAIN      
                             EXAMS:                                             
 CPT CODE:      [id number]  ABDOMEN LTD                             [address]      
        <Continued>                                        CC: Roozbeh Sharif MD; Jonny Love MD                                                           
                                        Technologist: Alona Abreu RDMS(AB)    
                            Trnscb Date/Time: 2020 () [email or web address]      
                Orig Print D/T: S: 2020 ()     Probe:                 
     PAGE  2                       Signed Report                               -
XR CHEST 1 -65-51 07:32:00 FAX:         Kisha Gilliam               
 [place]: GC  St: ADM FAX: Jonny Lerner MD   [phone]   
------------------------------------------------------------------------------- 
Name:   TY PORTER                    [facility]                    :
1950  Age/S: 69/M           [address] [place] [address]         Unit #: 
[id number]      Loc: [address] [address]                 Phys: 
Kisha Gilliam                                               Acct: 
[id number] Dis Date:               Status: ADM IN                             
   PHONE #: [phone]     Exam Date:     2020     0559                 
 FAX #: [phone]     Reason: resp failure                                   
   EXAMS:                                               CPT CODE:      [id number]
XR CHEST 1 V                               [address]                    Study:  - XR
CHEST 1 V 2020 6:00 AM               Patient Name: TY PORTER MR: 
[id number]       : 1950; Age: 69 years  y/o Male       Ordering 
Physician: SWATI Casillas               Clinical Indication: resp failure
              Comparison: 2020 x-ray               FINDINGS          
    LUNGS: No pneumothorax or pleural effusion identified               HEART 
AND MEDIASTINUM: Mildly enlarged heart with central pulmonary       vascular 
congestion and mild bilateral parahilar opacities consistent       with 
pulmonary edema or pneumonia.               LINES: Right IJ catheter tip ove
rlies the superior vena cava and       proximal right atrium.  Interval extubati
on and removal of the       nasogastric tube.               OSSEOUS STRUCTURES: 
Mild spinal degenerative change without fracture,       dislocation, or focal os
seous lesion.               OTHER: None.                         IMPRESSION:    
              Mild cardiomegaly with central pulmonary vascular congestion and  
      parahilar opacities representing pulmonary edema with or without         
infection                                       SL: [id number]          ** El
ectronically Signed by CRAIG Ortez on 2020 at 0732 **               
      Reported and signed by: Ty Ortez M.D.        PAGE  1                 
     Signed Report                    (CONTINUED)  FAX:         Randi Gilliam
[facility]  [place]                 [place]: [facility]: [facility] FAX: Jonny Lerner MD   [phone]
[phone]   --------------------------------------------------------------------
-----------  Name:   TY PORTER                    [facility]           
        : 1950  Age/S: 69/M           [address] [place], [place] [address]vd         
Unit #: [id number]      Loc: [address]       [place] [address]                 Phys
: Kisha Gilliam                                               Acct: [id number]
[id number] Dis Date:               Status: ADM IN                                 
PHONE #: [phone]     Exam Date:     2020     0559                   F
AX #: [phone]     Reason: resp failure                                     
 EXAMS:                                               CPT CODE:      [id number] 
XR CHEST 1 V                               [address]               <Continued>      
                                CC: Kisha LONGORIA; Jonny Love MD     
                                                                                
          Technologist: RT Susan(CHANG)                                      
Trnscrd Date/Time/By: 2020 (0732) : By: [email or web address]          Orig Print 
D/T: S: 2020 (0735)                         PAGE  2                       
Signed Report                               RENAL FUNCTION [id number] 
06:52:00* 



             Test Item    Value        Reference Range Interpretation Comments

 

             SODIUM (test code = NA) 140 mEq/L    134-147      N             

 

             POTASSIUM (test code = K) 3.8 mEq/L    3.4-5.0      N             

 

             CHLORIDE (test code = CL) 105 mEq/L    100-108      N             

 

             CARBON DIOXIDE (test code = CO2) 28 mEq/L     21-33        N       

      

 

             ANION GAP (test code = GAP) 11           0-20         N            

 

 

             GLUCOSE (test code = GLU) 85 mg/dL            N             

 

             BLOOD UREA NITROGEN (test code = BUN) 40 mg/dL     7-18         H  

           

 

             GLOMERULAR FILTRATION RATE (test code = GFR) 14.1         70-80    

    L            Units of measure = 

ml/min/1.73 m2

 

             CREATININE (test code = CREAT) 4.2 mg/dL    0.6-1.3      H         

    

 

             ALBUMIN (test code = ALB) 3.20 g/dL    3.4-5.0      L             

 

             CALCIUM (test code = CA) 8.9 mg/dL    8.0-10.5     N             

 

             PHOSPHOROUS (test code = PHOS) 2.2 MG/DL    2.5-4.9      L         

    





SURGICAL [id number] 09:39:00
--------------------------------------------------------------------------------
------------RUN DATE: 20                     [facility]  [facility]  *LIVE*      
              PAGE 1   RUN TIME: 939                            Specimen Inqui
ry                    RUN USER: INTERFACE                                       
                   ------------------------------------------------------------
--------------------------------PATIENT: TY PORTER                 ACCT #: [id number]
[id number] LOC:  [facility]      U #: [id number]                                    
  AGE/SX: 69/M         ROOM: [facility]     RE20[facility] DR:  Jonny Love MD
           :    [birth year]     BED:  1          DIS:                           
                    STATUS: ADM IN       TLOC:           --------------------
------------------------------------------------------------------------ SPEC #:
[id number]         RECD: 01/15/     STATUS:  SOUT           REQ #: [id number]
[id number]                           VIOLETA: 01/15/     [facility] DR: Jonny Love             ENTERED:      SP TYPE: SURG SPEC      OTHR DR: Chris Godoy MD
ust,Evaristo Espinoza MD, MDORDERED:  GM LEVEL 4                                      
                                  CODES: [id number] - LUNG, NOS COPIES TO:   Chris Godoy MD   [address] [place] [address]   [phone]
    Roland Henriquez MD   [address]   [place] [address]   [phone]
[phone]    Evaristo Hoyos MD   [address] [facility] [address]   [place] [address]   [phone]
[phone]    Jonny Love MD   [address] [place] [address] [address]   [place] [address]
8   [phone] PROCEDURES: GM LEVEL 4 (Incomplete) TISSUES:           1. LUNG,
NOS - Lung, left, upper lobe lavage, cytology            FINAL DIAGNOSIS    Sergio
g, left, upper lobe lavage, cytology, cytospin smears and cell block:     No mal
ignancy seen, AFB and GMS stains are negative for acid-fast and    fungal organi
sms.                                   ** CONTINUED ON NEXT PAGE ** ------------
--------------------------------------------------------------------------------
RUN DATE: 20                     [facility]  *LIVE*                  
  PAGE 2   RUN TIME: 939                            Specimen Inquiry           
        RUN USER: INTERFACE                                                     
     ------------------------------------------------------------------------
--------------------SPEC #: [id number]        PATIENT: TY PORTER ZANE            
     #[id number]  (Continued)------------------------------------------------
--------------------------------------------       GROSS AND MICROSCOPIC    MICHELLE
S DESCRIPTION:  Received is a left upper lobe lavage for cytologic        evalua
tion.                                                                           
                                                                     MICROSCOPIC
EXAMINATION:  The lavage specimen from the left upper lobe of     the lung cell 
block and cytospin smears reveal numerous histiocytes and       inflammatory c
ells as well as some respiratory epithelial cells.  No          definite evidenc
e of malignancy is identified in these cytologic              preparations.  The
AFB and GMS stains do not show acid fast or fungal         organisms. (When spe
cial stains have been reviewed, the appropriate           positive/negative cont
rols have been reviewed and are appropriately           positive/negative).     
                                                          POST-OP DIAGNOSIS    
Pneumonia         PRE-OP DIAGNOSIS    Pneumonia---------------------------------
----------------------------------------------------------- Signed SIGNATURE ON 
FILE                        Xin Moore MD 20 0939  -----------------
---------------------------------------------------------------------------     
                                                     ** END OF REPORT ** 
COMPREHENSIVE METABOLIC [id number] 06:10:00* 



             Test Item    Value        Reference Range Interpretation Comments

 

             SODIUM (test code = NA) 143 mEq/L    134-147      N             

 

             POTASSIUM (test code = K) 3.7 mEq/L    3.4-5.0                    

 

             CHLORIDE (test code = CL) 108 mEq/L    100-108      N             

 

             CARBON DIOXIDE (test code = CO2) 30 mEq/L     21-33                

      

 

             ANION GAP (test code = GAP) 9            0-20         N            

 

 

             GLUCOSE (test code = GLU) 86 mg/dL            N             

 

             BLOOD UREA NITROGEN (test code = BUN) 27 mg/dL     7-18         H  

           

 

             GLOMERULAR FILTRATION RATE (test code = GFR) 21.6         70-80    

    L            Units of measure = 

ml/min/1.73 m2

 

             CREATININE (test code = CREAT) 2.9 mg/dL    0.6-1.3      H         

    

 

             TOTAL PROTEIN (test code = PROT) 5.8 g/dL     6.4-8.2      L       

      

 

             ALBUMIN (test code = ALB) 3.30 g/dL    3.4-5.0      L             

 

             CALCIUM (test code = CA) 8.9 mg/dL    8.0-10.5     N             

 

             BILIRUBIN TOTAL (test code = BILT) 0.4 MG/DL    <1.5         N     

        

 

             SGOT/AST (test code = AST) 22 IUnit/L   15-37                      

 

             SGPT/ALT (test code = ALT) 59 IUnit/L   15-65        N             

 

             ALKALINE PHOSPHATASE TOTAL (test code = ALKP) 60 IUnit/L     

     N             





CBC W/AUTO [id number] 05:48:00* 



             Test Item    Value        Reference Range Interpretation Comments

 

             WHITE BLOOD CELL (test code = WBC) 10.00 x10 3/uL 4.5-11.0     N   

          

 

             RED BLOOD CELL (test code = RBC) 2.64 x10 6/uL 4.00-5.60    L      

       

 

             HEMOGLOBIN (test code = HGB) 7.8 g/dL     12.5-16.9    L           

  

 

             HEMATOCRIT (test code = HCT) 25.2 %       37.5-50.7    L           

  

 

             MEAN CELL VOLUME (test code = MCV) 95.5 fL      81.0-99.0    N     

        

 

             MEAN CELL HGB (test code = MCH) 29.5 pg      27.0-33.0    N        

     

 

             MEAN CELL HGB CONCETRATION (test code = MCHC) 31.0 g/dL    33.0-37.

0    L             

 

             RED CELL DISTRIBUTION WIDTH CV (test code = RDW) 18.0 %       11.5-

14.5    H             

 

             RED CELL DISTRIBUTION WIDTH SD (test code = RDW-SD) 62.7 fL      37

.0-54.0    H             

 

             PLATELET COUNT (test code = PLT) 198 x10 3/uL 150-400      N       

      

 

             MEAN PLATELET VOLUME (test code = MPV) 9.7 fL       7.0-9.0      H 

            

 

             NEUTROPHIL % (test code = NT%) 71.9 %       56.0-77.0    N         

    

 

             IMMATURE GRANULOCYTE % (test code = IG%) 0.6 %        0.0-2.0      

N             

 

             LYMPHOCYTE % (test code = LY%) 7.0 %        14.0-32.0    L         

    

 

             MONOCYTE % (test code = MO%) 6.1 %        4.8-9.0      N           

  

 

             EOSINOPHIL % (test code = EO%) 14.3 %       0.3-3.7      H         

    

 

             BASOPHIL % (test code = BA%) 0.1 %        0.0-2.0      N           

  

 

             NUCLEATED RBC % (test code = NRBC%) 0.0 %        0-0          N    

         

 

             NEUTROPHIL # (test code = NT#) 7.19 x10 3/uL 2.0-7.6      N        

     

 

             IMMATURE GRANULOCYTE # (test code = IG#) 0.06 x10 3/uL 0.00-0.03   

 H             

 

             LYMPHOCYTE # (test code = LY#) 0.70 x10 3/uL 1.0-3.8      L        

     

 

             MONOCYTE # (test code = MO#) 0.61 x10 3/uL 0.1-0.8      N          

   

 

             EOSINOPHIL # (test code = EO#) 1.43 x10 3/uL 0.0-0.2      H        

     

 

             BASOPHIL # (test code = BA#) 0.01 x10 3/uL 0.0-0.2      N          

   

 

             NUCLEATED RBC # (test code = NRBC#) 0.00 x10 3/uL 0.0-0.1      N   

          

 

             MANUAL DIFF REQUIRED (test code = MDIFF) NO                        

              





PROCALCITONIN (PCT)2020 09:54:00* 



             Test Item    Value        Reference Range Interpretation Comments

 

             PROCALCITONIN (PCT) (test code = PROCAL) 5.37 ng/mL   0.00-0.05    

H             PROCALCITONIN 

(PCT) NORMAL RANGE (ADULT): <0.05 NG/ML. * a concentration <0.5 ng/mL represents
a low risk of  severe sepsis and/or septic shock.* a concentration >2 ng/mL 
represents a high risk of severe  sepsis and/or septic shock.Nevertheless, 
concentrations <0.5 ng/mL do not exclude aninfection, on account of localized 
infections (withoutsystemic signs) which can be associated with such 
lowconcentrations, or a systemic infection in its initialstages (< 6 hours). 
Furthermore, increased procalcitonincan occur without infection. PCT 
concentrations between 0.5and 2.0 ng/mL should be interpreted taking into 
account thepatient's history. It is recommended to retest PCT within6-24 hours 
if any concentrations <2 ng/mL are obtained.





- XR CHEST 1 -21-73 07:38:00 FAX: Chris Lopez MD       [phone]
   [place]:   St: [facility] FAX: Jonny Lerner MD   [phone]   
------------------------------------------------------------------------------- 
Name:   TY PORTER                    [facility]                    :
1950  Age/S: 69/M           [address] [place] [address]         Unit #: 
[id number]      Loc: [address] [address]                 Phys: 
Chris Fernandez MD                                                      Acct: 
[id number] Dis Date:               Status: ADM IN                             
   PHONE #: [phone]     Exam Date:     2020     05                 
 FAX #: [phone]     Reason: Resp failure                                   
   EXAMS:                                               CPT CODE:      [id number]
XR CHEST 1 V                               [id number]                    EXAMINATION:
Chest one view 2020 at 0517 hours.               CLINICAL HISTORY:  
Respiratory failure.               COMPARISON:  Chest x-rays 1/15/2020.         
     FINDINGS:  The endotracheal tube terminates projected approximately       
4.7 cm above the steve.  The nasogastric tube terminates projected       over 
the stomach.  The right internal jugular central venous catheter       is stable
in position.               The cardiac silhouette is within normal limits for 
projection.        Atherosclerotic vascular calcifications are evident in the 
thoracic       aorta.               Interstitial opacities in both upper lungs 
are not significantly       changed in appearance.  When compared to the prior 
examination, there       is mild increase in hazy opacification in the right 
lower lung.  There       is no evidence of pleural effusion or pneumothorax.    
              IMPRESSION:           1.  Multiple support tubes and line in 
place.         2.  Stable interstitial opacities in the upper lungs bilaterally.
        3.  Increased hazy opacification of the right lower lung when compared  
      to prior examination may be secondary to atelectasis or infiltrate.       
                     ** Electronically Signed by CRAIG Diaz **        
  **             on 2020 at 0738             **                      
Reported and signed by: Brenda Diaz M.D.            CC: Chris Fernandez MD; 
Jonny Love MD                                                               
                                        Technologist: RT Mike(CHANG)      
                            Trnscrd Date/Time/By: 2020 (0738) : By: 
[facility]           Orig Print D/T: S: 2020 (0755)                        
PAGE  1                       Signed Report                               
LIPOPROTEIN [id number] 06:09:00* 



             Test Item    Value        Reference Range Interpretation Comments

 

             LIPOPROTEIN LDL (test code = LDL) 66 mg/dL     0-100        N      

      <100     [id number]  NEAR

OPTIMAL/ABOVE [id number]  [id number]  HIGH>EM=405  VERY 
HIGH*Guidelines provided by the National Cholesterol EducationProgram Adult 
Treatment Panel III





COMPREHENSIVE METABOLIC [id number] 05:54:00* 



             Test Item    Value        Reference Range Interpretation Comments

 

             SODIUM (test code = NA) 139 mEq/L    134-147      N             

 

             POTASSIUM (test code = K) 6.2 mEq/L    3.4-5.0      HH            

 

             CHLORIDE (test code = CL) 105 mEq/L    100-108                    

 

             CARBON DIOXIDE (test code = CO2) 23 mEq/L     21-33                

      

 

             ANION GAP (test code = GAP) 17           0-20         N            

 

 

             GLUCOSE (test code = GLU) 101 mg/dL           N             

 

             BLOOD UREA NITROGEN (test code = BUN) 52 mg/dL     7-18         H  

           

 

             GLOMERULAR FILTRATION RATE (test code = GFR) 12.7         70-80    

    L            Units of measure = 

ml/min/1.73 m2

 

             CREATININE (test code = CREAT) 4.6 mg/dL    0.6-1.3      H         

    

 

             TOTAL PROTEIN (test code = PROT) 5.5 g/dL     6.4-8.2      L       

      

 

             ALBUMIN (test code = ALB) 2.30 g/dL    3.4-5.0      L             

 

             CALCIUM (test code = CA) 9.2 mg/dL    8.0-10.5     N             

 

             BILIRUBIN TOTAL (test code = BILT) 0.4 MG/DL    <1.5         N     

        

 

             SGOT/AST (test code = AST) 79 IUnit/L   15-37        H             

 

             SGPT/ALT (test code = ALT) 117 IUnit/L  15-65        H             

 

             ALKALINE PHOSPHATASE TOTAL (test code = ALKP) 64 IUnit/L     

     N             





[id number] 05:54:00* 



             Test Item    Value        Reference Range Interpretation Comments

 

             PHOSPHOROUS (test code = PHOS) 6.6 MG/DL    2.5-4.9      H         

    





[id number] 05:54:00* 



             Test Item    Value        Reference Range Interpretation Comments

 

             MAGNESIUM (test code = MAG) 2.80 mg/dL   1.8-2.4      H            

 





[id number]--[id number] 05:54:00* 



             Test Item    Value        Reference Range Interpretation Comments

 

             TROPONIN-I (test code = TROPI) 0.080 ng/mL  0.000-0.045  HH        

    Negative:            

<= 0.045 Positive:             >= 0.046 Correlation with serial results, other 
cardiac markers andclinical findings is necessary to determine the 
clinicalsignificance of this result. Results using different methodologies 
should not be comparedto one another as quantitative results may vary by method.





CALCIUM [id number] 05:54:00* 



             Test Item    Value        Reference Range Interpretation Comments

 

             CALCIUM IONIZED (test code = TASHIA) 1.26 MMOL/L  1.12-1.32    N      

       





LACTIC [id number] 05:22:00* 



             Test Item    Value        Reference Range Interpretation Comments

 

             LACTIC ACID (test code = LACT) 2.2 mmol/L   0.4-1.9      H         

    





COMPREHENSIVE METABOLIC [id number] 05:14:00* 



             Test Item    Value        Reference Range Interpretation Comments

 

             SODIUM (test code = NA)  mEq/L       134-147                    

 

             POTASSIUM (test code = K)  mEq/L       3.4-5.0                    

 

             CHLORIDE (test code = CL)  mEq/L       100-108                    

 

             CARBON DIOXIDE (test code = CO2)  mEq/L       21-33                

      

 

             ANION GAP (test code = GAP)              0-20                      

 

 

             GLUCOSE (test code = GLU)  mg/dL                            

 

             BLOOD UREA NITROGEN (test code = BUN)  mg/dL       7-18            

           

 

             GLOMERULAR FILTRATION RATE (test code = GFR)              70-80    

                  

 

             CREATININE (test code = CREAT)  mg/dL       0.6-1.3                

    

 

             TOTAL PROTEIN (test code = PROT)  g/dL        6.4-8.2              

      

 

             ALBUMIN (test code = ALB)  g/dL        3.4-5.0                    

 

             CALCIUM (test code = CA)  mg/dL       8.0-10.5                   

 

             BILIRUBIN TOTAL (test code = BILT)  MG/DL       <1.5               

        

 

             SGOT/AST (test code = AST)  IUnit/L     15-37                      

 

             SGPT/ALT (test code = ALT)  IUnit/L     15-65                      

 

             ALKALINE PHOSPHATASE TOTAL (test code = ALKP)  IUnit/L       

                   





[id number] 05:14:00* 



             Test Item    Value        Reference Range Interpretation Comments

 

             PHOSPHOROUS (test code = PHOS)  MG/DL       2.5-4.9                

    





[id number] 05:14:00* 



             Test Item    Value        Reference Range Interpretation Comments

 

             MAGNESIUM (test code = MAG)  mg/dL       1.8-2.4                   

 





[id number] 05:14:00* 



             Test Item    Value        Reference Range Interpretation Comments

 

             TROPONIN-I (test code = TROPI)  ng/mL       0.000-0.045            

    





CALCIUM [id number] 05:14:00* 



             Test Item    Value        Reference Range Interpretation Comments

 

             CALCIUM IONIZED (test code = TASHIA) 1.26 MMOL/L  1.12-1.32    N      

       





CBC W/AUTO [id number] 05:02:00* 



             Test Item    Value        Reference Range Interpretation Comments

 

             WHITE BLOOD CELL (test code = WBC) 13.75 x10 3/uL 4.5-11.0     H   

          

 

             RED BLOOD CELL (test code = RBC) 3.23 x10 6/uL 4.00-5.60    L      

       

 

             HEMOGLOBIN (test code = HGB) 9.5 g/dL     12.5-16.9    L           

  

 

             HEMATOCRIT (test code = HCT) 30.6 %       37.5-50.7    L           

  

 

             MEAN CELL VOLUME (test code = MCV) 94.7 fL      81.0-99.0          

        

 

             MEAN CELL HGB (test code = MCH) 29.4 pg      27.0-33.0    N        

     

 

             MEAN CELL HGB CONCETRATION (test code = MCHC) 31.0 g/dL    33.0-37.

0    L             

 

             RED CELL DISTRIBUTION WIDTH CV (test code = RDW) 18.0 %       11.5-

14.5    H             

 

             RED CELL DISTRIBUTION WIDTH SD (test code = RDW-SD) 62.8 fL      37

.0-54.0    H             

 

             PLATELET COUNT (test code = PLT) 218 x10 3/uL 150-400      N       

      

 

             MEAN PLATELET VOLUME (test code = MPV) 9.5 fL       7.0-9.0      H 

            

 

             NEUTROPHIL % (test code = NT%) 90.1 %       56.0-77.0    H         

    

 

             IMMATURE GRANULOCYTE % (test code = IG%) 0.7 %        0.0-2.0      

N             

 

             LYMPHOCYTE % (test code = LY%) 3.3 %        14.0-32.0    L         

    

 

             MONOCYTE % (test code = MO%) 2.7 %        4.8-9.0      L           

  

 

             EOSINOPHIL % (test code = EO%) 3.1 %        0.3-3.7      N         

    

 

             BASOPHIL % (test code = BA%) 0.1 %        0.0-2.0      N           

  

 

             NUCLEATED RBC % (test code = NRBC%) 0.0 %        0-0          N    

         

 

             NEUTROPHIL # (test code = NT#) 12.39 x10 3/uL 2.0-7.6      H       

      

 

             IMMATURE GRANULOCYTE # (test code = IG#) 0.10 x10 3/uL 0.00-0.03   

 H             

 

             LYMPHOCYTE # (test code = LY#) 0.45 x10 3/uL 1.0-3.8      L        

     

 

             MONOCYTE # (test code = MO#) 0.37 x10 3/uL 0.1-0.8      N          

   

 

             EOSINOPHIL # (test code = EO#) 0.43 x10 3/uL 0.0-0.2      H        

     

 

             BASOPHIL # (test code = BA#) 0.01 x10 3/uL 0.0-0.2      N          

   

 

             NUCLEATED RBC # (test code = NRBC#) 0.00 x10 3/uL 0.0-0.1      N   

          

 

             MANUAL DIFF REQUIRED (test code = MDIFF) NO                        

              





ARTERIAL BLOOD [id number] 04:57:00* 



             Test Item    Value        Reference Range Interpretation Comments

 

             ARTERIAL BLOOD GAS PH (test code = PHA) 7.434        7.35-7.45    N

             

 

             ARTERIAL BLOOD GAS PCO2 (test code = PCO2A) 38.9 mmHg    35-45     

   N             

 

             ARTERIAL BLOOD GAS PO2 (test code = PO2A) 150 mmHg           

 H             

 

             BICARBONATE TOTAL HCO3 (test code = HCO3) 26.1 mmol/L  22.0-26.0   

 H             

 

             BASE EXCESS (test code = URIEL) 2.0 mmol/L   -4-4         N          

   

 

             ABG O2 SATURATION (test code = SATA) 99 %                N   

          

 

             FIO2 (test code = FIO2A) 40 %                                    

 

             ABG DELIVERY (test code = MATTY) Vent                               

     

 

             ABG VENT MODE (test code = MODEA) PSV                              

       

 

             ABG PEEP (test code = PEEPA) 5 cmH2O                               

  

 

             ABG PRESSURE SUPPORT (test code = PSABG) 10 cmH2O                  

             Performed by certified 

 at  [facility]

 

             ABG TEMPERATURE (test code = TEMPA) 98.0 F                         

         

 

             ABG SITE (test code = SITEA) Art line                              

  

 

             PREDICTED AA GRADIENT (test code = AP) 62                          

            

 

             PREDICTED PO2 (test code = OP) 177                                 

    

 

             a/A RATIO (test code = RATIO) 0.63                                 

   

 

             TCO2 ARTERIAL (test code = TCO2A) 27                               

       

 

             A-A GRADIENT (test code = AAGRADE) 89                              

        





[id number] 22:25:00* 



             Test Item    Value        Reference Range Interpretation Comments

 

             TROPONIN-I (test code = TROPI) 0.140 ng/mL  0.000-0.045  HH        

    Negative:            

<= 0.045 Positive:             >= 0.046 Correlation with serial results, other 
cardiac markers andclinical findings is necessary to determine the 
clinicalsignificance of this result. Results using different methodologies 
should not be comparedto one another as quantitative results may vary by method.





LACTIC ACID REPEAT2020-01-15 16:11:00* 



             Test Item    Value        Reference Range Interpretation Comments

 

             LACTIC ACID REPEAT (test code = LACTR) 1.4 mmol/l   0.4-1.9      N 

            





- XR CHEST 1 -01-15 15:25:00 FAX: Y Sharif,Roozbeh MD   [phone]
   [place]:   St: ADM FAX: Jonny Lerner MD   [phone]   
------------------------------------------------------------------------------- 
Name:   TY PORTER                    [facility]                    :
1950  Age/S: 69/M           [address] [place] [address]         Unit #: 
[id number]      Loc: [address]       [place] [address]                 Phys: 
Sharif,Roozbeh MD                                                  Acct: 
[id number] Dis Date:               Status: ADM IN                             
   PHONE #: [phone]     Exam Date:     01/15/2020     1520                 
 FAX #: [phone]     Reason: NGT PLACEMENT                                  
   EXAMS:                                               CPT CODE:      [id number]
XR CHEST 1 V                               [id number]                    Study:  - XR
CHEST 1 V 1/15/2020 2:53 PM               Patient Name: TY PORTER MR: 
[id number]       : 1950; Age: 69 years  y/o Male       Ordering 
Physician: Roozbeh Sharif, MD               Clinical Indication: NGT PLACEMENT  
            Comparison: January 15, 2020 x-ray               FINDINGS           
   LUNGS: Interstitial opacities are seen bilaterally, primarily in the       
upper lung.               HEART AND MEDIASTINUM: Normal size heart.             
 LINES: Nasogastric tube tip overlies the stomach.  Endotracheal tube       tip 
overlies the distal trachea.               OSSEOUS STRUCTURES: No fracture, 
dislocation, or suspicious focal       osseous lesion.               OTHER: 
None.                         IMPRESSION:                   The nasogastric tube
tip overlies the stomach                   [id number]          ** 
Electronically Signed by CRAIG Ortez on 01/15/2020 at 1525 **             
        Reported and signed by: Ty Ortez M.D.        CC: Roozbeh Sharif MD;
Jonny Love MD                                                               
                                    Technologist: RT [place](R)           
                        Trnscrd Date/Time/By: 01/15/2020 (1525) : By: [email or web address]
         [facility] Print D/T: S: 01/15/2020 (2298)                         PAGE  1   
                   Signed Report                               ARTERIAL BLOOD 
GAS2020-01-15 14:34:00* 



             Test Item    Value        Reference Range Interpretation Comments

 

             ARTERIAL BLOOD GAS PH (test code = PHA) 7.452        7.35-7.45    H

             

 

             ARTERIAL BLOOD GAS PCO2 (test code = PCO2A) 35.5 mmHg    35-45     

   N             

 

             ARTERIAL BLOOD GAS PO2 (test code = PO2A) 122 mmHg           

 H             

 

             BICARBONATE TOTAL HCO3 (test code = HCO3) 24.8 mmol/L  22.0-26.0   

 N             

 

             BASE EXCESS (test code = URIEL) 1.0 mmol/L   -4-4         N          

   

 

             ABG O2 SATURATION (test code = SATA) 99 %                N   

          

 

             FIO2 (test code = FIO2A) 50 %                                    

 

             ABG DELIVERY (test code = MATTY) Vent                               

     

 

             ABG VENT MODE (test code = MODEA) AC v con                         

       

 

             ABG VENT RESP RATE (test code = RRA) 20 /MIN                       

          

 

             ABG TIDAL VOLUME (test code = TVA) 550 ml                          

        

 

             ABG PEEP (test code = PEEPA) 5 cmH2O                               

 Performed by certified  at  

[facility]

 

             ABG TEMPERATURE (test code = TEMPA) 98.0 F                         

         

 

             ABG SITE (test code = SITEA) Art line                              

  

 

             PREDICTED AA GRADIENT (test code = AP) 81                          

            

 

             PREDICTED PO2 (test code = OP) 232                                 

    

 

             a/A RATIO (test code = RATIO) 0.39                                 

   

 

             TCO2 ARTERIAL (test code = TCO2A) 26                               

       

 

             A-A GRADIENT (test code = AAGRADE) 192                             

        





BODY FLUID CELL CT/DIFF2020-01-15 14:09:00* 



             Test Item    Value        Reference Range Interpretation Comments

 

             FLUID SOURCE (test code = SOURCEFL) BRONCHIAL LAVAGE               

            The test results must 

be integrated into the clinicalcontext for interpretation.

 

             FLUID COLOR (test code = COLFL) COLORLESS                          

     

 

             FLUID APPEARANCE (test code = APPFL) CLOUDY                        

          

 

             FLUID WBC (test code = WBCFL) 40 MM3       0-500        N          

   

 

             FLUID RBC (test code = RBCFL) 568 MM3      <=100,000    N          

   

 

             FLUID POLY (test code = POLYFL) 25 %                               

     

 

             FLUID LYMPHOCYTE (test code = LYMPHFL) 72 %                        

            

 

             FLUID EOSINOPHIL (test code = EOSFL) 3 %                           

          

 

             FLUID MESOTHELIAL (test code = MESFL)  %                           

           MODERATE 





CALCIUM IONIZED2020-01-15 13:54:00* 



             Test Item    Value        Reference Range Interpretation Comments

 

             CALCIUM IONIZED (test code = TASHIA) 1.35 MMOL/L  1.12-1.32    H      

       





- XR CHEST 1 -01-15 13:17:00 FAX: Chris Lopez MD       [phone]
   [place]:   St: [facility] FAX: Jonny Lerner MD   [phone]   
------------------------------------------------------------------------------- 
Name:   TY PORTER                    [place]                    :
1950  Age/S: 69/M           [address] [place] [address]         Unit #: 
[id number]      Loc: [address]       [place] [address]                 Phys: 
Chris Fernandez MD                                                      Acct: 
[id number] Dis Date:               Status: ADM IN                             
   PHONE #: [phone]     Exam Date:     01/15/2020     1305                 
 FAX #: [phone]     Reason: S/P CODE TUBE AND LINE PLACEMENT               
   EXAMS:                                               CPT CODE:      [id number]
XR CHEST 1 V                               [id number]                    Study:  - XR
CHEST 1 V 1/15/2020 12:58 PM               Patient Name: TY PORTER MR: 
[id number]       : 1950; Age: 69 years  y/o Male       Ordering 
Physician: Chris Fernandez MD               Clinical Indication: S/P CODE TUBE AND 
LINE PLACEMENT               Comparison: 2020 x-ray               
FINDINGS               LUNGS: Interstitial and airspace opacities in the left 
greater than       right upper lungs.               HEART AND MEDIASTINUM: 
Mildly enlarged heart.               LINES: Endotracheal tube tip overlies mid 
trachea.  Right IJ catheter       tips overlie the distal superior vena cava and
proximal right atrium.               OSSEOUS STRUCTURES: No fracture, disl
ocation, or suspicious focal       osseous lesion.               OTHER: None.   
                     IMPRESSION:                             1.  Endotracheal t
ube projects in good position.         2.  Interstitial and airspace upper lung 
opacities, representing         underlying chronic lung disease with or without 
pneumonia.                                       SL: [id number]          ** El
ectronically Signed by CRAIG Ortez on 01/15/2020 at 1317 **               
      Reported and signed by: Ty Ortez M.D.         PAGE  1                
      Signed Report                    (CONTINUED)  FAX: Chris Lopez MD 
     [phone]    [place]:   St: [facility] FAX: Jonny Lerner MD   [phone]
[phone]   -------------------------------------------------------------------
------------  Name:   TY PORTER                    [facility]          
         : 1950  Age/S: 69/M           [address] [place] [address]         
Unit #: [id number]      Loc: [address]       [place] [address]                 Phy
s: Chris Fernandez MD                                                      Acct: [id number]
[id number] Dis Date:               Status: ADM IN                                
PHONE #: [phone]     Exam Date:     01/15/2020     1305                   
FAX #: [phone]     Reason: S/P CODE TUBE AND LINE PLACEMENT                
  EXAMS:                                               CPT CODE:      [id number] 
XR CHEST 1 V                               [id number]               <Continued>      
                                CC: Chris Fernandez MD; Jonny Love MD            
                                                                                
          Technologist: RT Arcadio(R)                                  
Trnscrd Date/Time/By: 01/15/2020 (3528) : By: [email or web address]          Orig Print 
D/T: S: 01/15/2020 (8031)                         PAGE  2                       
Signed Report                               LACTIC ACID2020-01-15 12:48:00* 



             Test Item    Value        Reference Range Interpretation Comments

 

             LACTIC ACID (test code = LACT) 5.4 mmol/L   0.4-1.9      HH        

    





BASIC METABOLIC PANEL2020-01-15 12:43:00* 



             Test Item    Value        Reference Range Interpretation Comments

 

             SODIUM (test code = NA) 146 mEq/L    134-147      N             

 

             POTASSIUM (test code = K) 2.7 mEq/L    3.4-5.0      LL            

 

             CHLORIDE (test code = CL) 120 mEq/L    100-108      H             

 

             CARBON DIOXIDE (test code = CO2) 18 mEq/L     21-33        L       

      

 

             ANION GAP (test code = GAP) 11           0-20         N            

 

 

             GLUCOSE (test code = GLU) 94 mg/dL                          

 

             BLOOD UREA NITROGEN (test code = BUN) 26 mg/dL     7-18         H  

           

 

             GLOMERULAR FILTRATION RATE (test code = GFR) 25.7         70-80    

    L            Units of measure = 

ml/min/1.73 m2

 

             CREATININE (test code = CREAT) 2.5 mg/dL    0.6-1.3      H         

    

 

             CALCIUM (test code = CA) 7.7 mg/dL    8.0-10.5     L             





PHOSPHOROUS2020-01-15 12:43:00* 



             Test Item    Value        Reference Range Interpretation Comments

 

             PHOSPHOROUS (test code = PHOS) 4.6 MG/DL    2.5-4.9      N         

    





MAGNESIUM2020-01-15 12:43:00* 



             Test Item    Value        Reference Range Interpretation Comments

 

             MAGNESIUM (test code = MAG) 1.40 mg/dL   1.8-2.4      L            

 





[id number] 12:43:00* 



             Test Item    Value        Reference Range Interpretation Comments

 

             TROPONIN-I (test code = TROPI) 0.028 ng/mL  0.000-0.045  N         

   Negative:             

<= 0.045 Positive:             >= 0.046 Correlation with serial results, other 
cardiac markers andclinical findings is necessary to determine the 
clinicalsignificance of this result. Results using different methodologies 
should not be comparedto one another as quantitative results may vary by method.





PROTHROMBIN TIME2020-01-15 12:25:00* 



             Test Item    Value        Reference Range Interpretation Comments

 

             PROTHROMBIN TIME PATIENT (test code = PTP) 15.6 SECONDS 9.3-12.9   

  H             

 

             INTERNATIONAL NORMAL RATIO (test code = INR) 1.4          0.8-1.2  

    H                            

TARGET INR BY INDICATION   Indication                                      INR1.
Prophylaxis of venous thrombosis             2.0 - 3.0   (orthopedic surgery), 
Prophylaxis of   venous thrombosis (other than high-risk   surgery), Treatment 
of Deep Vein   Thrombosis/Pulmonary Embolism, Prevention   of systemic embolism 
- Tissue heart valves,   Acute Myocardial Infarction (to prevent   systemic 
embolism), Valvular heart disease,   Atrial Fibrillation, Bileaflet mechanical  
valve in aortic position.2. Mechanical prosthetic valves (high risk),    2.5 - 
3.5   Presence of Lupus Anticoagulant or   Antiphospholipid Antibodies, 
Prevention of   systemic embolism - Acute Myocardial Infarction   (to prevent 
recurrent infarct).





THROMBOPLASTIN TIME PARTIAL2020-01-15 12:25:00* 



             Test Item    Value        Reference Range Interpretation Comments

 

             THROMBOPLASTIN TIME PARTIAL (test code = PTT) 31.0 Seconds 25.0-39.

5    N                

Therapeutic Range: 50.4 - 88.3 Seconds        **** Effective 2019 ****





CBC W/AUTO DIFF2020-01-15 12:19:00* 



             Test Item    Value        Reference Range Interpretation Comments

 

             WHITE BLOOD CELL (test code = WBC) 15.39 x10 3/uL 4.5-11.0     H   

          

 

             RED BLOOD CELL (test code = RBC) 3.17 x10 6/uL 4.00-5.60    L      

       

 

             HEMOGLOBIN (test code = HGB) 9.6 g/dL     12.5-16.9    L           

  

 

             HEMATOCRIT (test code = HCT) 31.4 %       37.5-50.7    L           

  

 

             MEAN CELL VOLUME (test code = MCV) 99.1 fL      81.0-99.0    H     

        

 

             MEAN CELL HGB (test code = MCH) 30.3 pg      27.0-33.0    N        

     

 

             MEAN CELL HGB CONCETRATION (test code = MCHC) 30.6 g/dL    33.0-37.

0    L             

 

             RED CELL DISTRIBUTION WIDTH CV (test code = RDW) 18.6 %       11.5-

14.5    H             

 

             RED CELL DISTRIBUTION WIDTH SD (test code = RDW-SD) 67.1 fL      37

.0-54.0    H             

 

             PLATELET COUNT (test code = PLT) 223 x10 3/uL 150-400      N       

      

 

             MEAN PLATELET VOLUME (test code = MPV) 9.6 fL       7.0-9.0      H 

            

 

             NEUTROPHIL % (test code = NT%) 81.9 %       56.0-77.0    H         

    

 

             IMMATURE GRANULOCYTE % (test code = IG%) 2.1 %        0.0-2.0      

H             

 

             LYMPHOCYTE % (test code = LY%) 5.4 %        14.0-32.0    L         

    

 

             MONOCYTE % (test code = MO%) 3.0 %        4.8-9.0      L           

  

 

             EOSINOPHIL % (test code = EO%) 7.5 %        0.3-3.7      H         

    

 

             BASOPHIL % (test code = BA%) 0.1 %        0.0-2.0      N           

  

 

             NUCLEATED RBC % (test code = NRBC%) 0.0 %        0-0          N    

         

 

             NEUTROPHIL # (test code = NT#) 12.61 x10 3/uL 2.0-7.6      H       

      

 

             IMMATURE GRANULOCYTE # (test code = IG#) 0.32 x10 3/uL 0.00-0.03   

 H             

 

             LYMPHOCYTE # (test code = LY#) 0.83 x10 3/uL 1.0-3.8      L        

     

 

             MONOCYTE # (test code = MO#) 0.46 x10 3/uL 0.1-0.8      N          

   

 

             EOSINOPHIL # (test code = EO#) 1.15 x10 3/uL 0.0-0.2      H        

     

 

             BASOPHIL # (test code = BA#) 0.02 x10 3/uL 0.0-0.2      N          

   

 

             NUCLEATED RBC # (test code = NRBC#) 0.00 x10 3/uL 0.0-0.1      N   

          

 

             MANUAL DIFF REQUIRED (test code = MDIFF) NO                        

              





ACUTE HEPATITIS PANEL2020-01-15 12:09:00* 



             Test Item    Value        Reference Range Interpretation Comments

 

             AB HEPATITIS A IGM (test code = HAVMAB) NON REACTIVE INDEX NON REAC

T.                 

 

             AG HEPATITIS B SURFACE (test code = HBSAG) NON REACTIVE INDEX NonRe

active                

 

             AB HEPATITIS B CORE IGM (test code = HBCMAB) NON REACTIVE INDEX NON

 REACT.                 

 

             AB HEPATITIS C (test code = HCVAB) NON REACTIVE INDEX NON REACT.   

              





AB HEPATITIS B SURFACE2020-01-15 12:09:00* 



             Test Item    Value        Reference Range Interpretation Comments

 

             AB HEPATITIS B SURFACE (test code = HBSAB) < 3.1 mIU/mL Immunity>9.

9 L              Status 

of Immunity                     Anti-HBs Level  ------------------              
      --------------Inconsistent with Immunity                   0.0 - 
9.9Consistent with Immunity                          >9.9Performed At:  
[facility] [address] [id number] Fausto TAVERA MD 
Ph:[phone]





CBC W/AUTO DIFF2020-01-15 07:38:00* 



             Test Item    Value        Reference Range Interpretation Comments

 

             WHITE BLOOD CELL (test code = WBC) 13.81 x10 3/uL 4.5-11.0     H   

          

 

             RED BLOOD CELL (test code = RBC) 3.37 x10 6/uL 4.00-5.60    L      

       

 

             HEMOGLOBIN (test code = HGB) 10.0 g/dL    12.5-16.9    L           

  

 

             HEMATOCRIT (test code = HCT) 33.4 %       37.5-50.7    L           

  

 

             MEAN CELL VOLUME (test code = MCV) 99.1 fL      81.0-99.0    H     

        

 

             MEAN CELL HGB (test code = MCH) 29.7 pg      27.0-33.0    N        

     

 

             MEAN CELL HGB CONCETRATION (test code = MCHC) 29.9 g/dL    33.0-37.

0    L             

 

             RED CELL DISTRIBUTION WIDTH CV (test code = RDW) 18.7 %       11.5-

14.5    H             

 

             RED CELL DISTRIBUTION WIDTH SD (test code = RDW-SD) 67.4 fL      37

.0-54.0    H             

 

             PLATELET COUNT (test code = PLT) 250 x10 3/uL 150-400      N       

      

 

             MEAN PLATELET VOLUME (test code = MPV) 9.6 fL       7.0-9.0      H 

            

 

             NEUTROPHIL % (test code = NT%) 85.8 %       56.0-77.0    H         

    

 

             IMMATURE GRANULOCYTE % (test code = IG%) 0.5 %        0.0-2.0      

N             

 

             LYMPHOCYTE % (test code = LY%) 3.3 %        14.0-32.0    L         

    

 

             MONOCYTE % (test code = MO%) 3.1 %        4.8-9.0      L           

  

 

             EOSINOPHIL % (test code = EO%) 7.2 %        0.3-3.7      H         

    

 

             BASOPHIL % (test code = BA%) 0.1 %        0.0-2.0      N           

  

 

             NUCLEATED RBC % (test code = NRBC%) 0.0 %        0-0          N    

         

 

             NEUTROPHIL # (test code = NT#) 11.84 x10 3/uL 2.0-7.6      H       

      

 

             IMMATURE GRANULOCYTE # (test code = IG#) 0.07 x10 3/uL 0.00-0.03   

 H             

 

             LYMPHOCYTE # (test code = LY#) 0.45 x10 3/uL 1.0-3.8      L        

     

 

             MONOCYTE # (test code = MO#) 0.43 x10 3/uL 0.1-0.8      N          

   

 

             EOSINOPHIL # (test code = EO#) 1.00 x10 3/uL 0.0-0.2      H        

     

 

             BASOPHIL # (test code = BA#) 0.02 x10 3/uL 0.0-0.2      N          

   

 

             NUCLEATED RBC # (test code = NRBC#) 0.00 x10 3/uL 0.0-0.1      N   

          

 

             MANUAL DIFF REQUIRED (test code = MDIFF) NO                        

              





BASIC METABOLIC PANEL2020-01-15 07:34:00* 



             Test Item    Value        Reference Range Interpretation Comments

 

             SODIUM (test code = NA) 137 mEq/L    134-147      N             

 

             POTASSIUM (test code = K) 3.5 mEq/L    3.4-5.0      N             

 

             CHLORIDE (test code = CL) 103 mEq/L    100-108      N             

 

             CARBON DIOXIDE (test code = CO2) 24 mEq/L     21-33        N       

      

 

             ANION GAP (test code = GAP) 14           0-20         N            

 

 

             GLUCOSE (test code = GLU) 72 mg/dL            N             

 

             BLOOD UREA NITROGEN (test code = BUN) 27 mg/dL     7-18         H  

           

 

             GLOMERULAR FILTRATION RATE (test code = GFR) 19.3         70-80    

    L            Units of measure = 

ml/min/1.73 m2

 

             CREATININE (test code = CREAT) 3.2 mg/dL    0.6-1.3      H         

    

 

             CALCIUM (test code = CA) 7.9 mg/dL    8.0-10.5     L             





ACUTE HEPATITIS [id number] 09:36:00* 



             Test Item    Value        Reference Range Interpretation Comments

 

             AB HEPATITIS A IGM (test code = HAVMAB) NON REACTIVE INDEX NON REAC

T.                 

 

             AG HEPATITIS B SURFACE (test code = HBSAG) NON REACTIVE INDEX NonRe

active                

 

             AB HEPATITIS B CORE IGM (test code = HBCMAB) NON REACTIVE INDEX NON

 REACT.                 

 

             AB HEPATITIS C (test code = HCVAB) NON REACTIVE INDEX NON REACT.   

              





AB HEPATITIS B [id number] 09:36:00* 



             Test Item    Value        Reference Range Interpretation Comments

 

             AB HEPATITIS B SURFACE (test code = HBSAB)                         

                





ACUTE HEPATITIS [id number] 09:20:00* 



             Test Item    Value        Reference Range Interpretation Comments

 

             AB HEPATITIS A IGM (test code = HAVMAB)  INDEX       NON REACT.    

             

 

             AG HEPATITIS B SURFACE (test code = HBSAG) NON REACTIVE INDEX NonRe

active                

 

             AB HEPATITIS B CORE IGM (test code = HBCMAB)  INDEX       NON REACT

.                 

 

             AB HEPATITIS C (test code = HCVAB)  INDEX       NON REACT.         

        





AB HEPATITIS B [id number] 09:20:00* 



             Test Item    Value        Reference Range Interpretation Comments

 

             AB HEPATITIS B SURFACE (test code = HBSAB)                         

                





- CT CHEST W/O [id number] 15:34:00  Name: TY PORTER                 
   [facility]                    : 1950 Age/S: 69  / M         [address] [place] [address]         Unit #: [id number]     Loc:               [place] [address]                 Phys: Chris Fernandez MD                                   
                  Acct: [id number]  Dis Date:               Status: ADM IN    
                             PHONE #: [phone]     Exam Date: 2020  
1401                     FAX #: [phone]      Reason: Pneumonia ? ILD       
                             EXAMS:                                             
 CPT CODE:      [id number] CT CHEST W/O CONTRAST                      [address]      
             Clinical Indication: Pneumonia.  Interstitial lung disease.        
Comparison: 10/27/2019.               TECHNIQUE: Contiguous axial CT images of 
the chest were acquired       without the administration of IV contrast. Coronal
and sagittal       reconstructions were obtained.               CT imaging 
performed at this location utilizes radiation dose       optimization techniques
which include one or more of the following:       -Automated exposure control   
   -Adjustment of the mA and/or kV according to patient size       -Use of 
iterative reconstruction technique       CT Radiation Dose .2 mGy-cm     
         FINDINGS:                Right internal jugular central venous catheter
is in place extending       to the high right atrium.               Interval 
development of moderate right and small left pleural       effusions.  Moderate 
background emphysema and bronchiectasis.        Previously visualized 
consolidative opacities in the lung apices are       similar in appearance.  
More progressive groundglass opacification of       lung parenchyma within the 
lung bases.               Mild cardiomegaly.  Mild coronary and aortic 
atherosclerosis.        Prominent mediastinal lymph nodes without overt 
lymphadenopathy.               Central airway is patent.  Visualized thyroid g
land is unremarkable.        Mild degenerative changes of the spine.  Soft tissu
es are       unremarkable.                   IMPRESSION:         1. Redemonstrat
ion of moderate background emphysema and upper lobe         bronchiectasis.  Pre
viously visualized consolidative infiltrates in         the upper lobes are kd
lar in appearance.         2.  Interval development of moderate right and small 
left pleural         effusions with new lower lobe groundglass airspace opacitie
s         suggestive of edema.                   SL:  [id number]         PAGE 
1                       Signed Report                    (CONTINUED)   Name: TY OVIEDO                     [facility]                    : [birth year] Age/S: 69  / M         [address] [place] [address]         Unit #: [id number]    
Loc:               [place] [address]                 Phys: Chris Fernandez MD       
                                              Acct: [id number]  Dis Date:     
         Status: ADM IN                                  PHONE #: [phone]  
  Exam Date: 2020  1401                     FAX #: [phone]      R
ashok: Pneumonia ? ILD                                     EXAMS:               
                               CPT CODE:      [id number] CT CHEST W/O CONTRAST   
                  [address]               <Continued>                ** 
Electronically Signed by CRAIG Poole **            **            on 
2020 at 1534            **                      Reported and signed by: 
Beni Poole M.D.                                      CC: Chris Fernandez MD; 
Jonny Love MD                                                               
                                        Technologist:Marco Antonio Rodney, RT(R)(CT)   
    CTDI:        DLP:        Trnscb Date/Time: 2020 (1534) [id number]     
                 Orig Print D/T: S: 2020 (1299)      PAGE  2              
        Signed Report                               CBC W/AUTO [id number] 
09:47:00* 



             Test Item    Value        Reference Range Interpretation Comments

 

             WHITE BLOOD CELL (test code = WBC) 7.85 x10 3/uL 4.5-11.0          

         

 

             RED BLOOD CELL (test code = RBC) 3.23 x10 6/uL 4.00-5.60    L      

       

 

             HEMOGLOBIN (test code = HGB) 9.7 g/dL     12.5-16.9    L           

  

 

             HEMATOCRIT (test code = HCT) 32.4 %       37.5-50.7    L           

  

 

             MEAN CELL VOLUME (test code = MCV) 100.3 fL     81.0-99.0    H     

        

 

             MEAN CELL HGB (test code = MCH) 30.0 pg      27.0-33.0    N        

     

 

             MEAN CELL HGB CONCETRATION (test code = MCHC) 29.9 g/dL    33.0-37.

0    L             

 

             RED CELL DISTRIBUTION WIDTH CV (test code = RDW) 18.5 %       11.5-

14.5    H             

 

             RED CELL DISTRIBUTION WIDTH SD (test code = RDW-SD) 67.1 fL      37

.0-54.0    H             

 

             PLATELET COUNT (test code = PLT) 284 x10 3/uL 150-400      N       

      

 

             MEAN PLATELET VOLUME (test code = MPV) 9.9 fL       7.0-9.0      H 

            

 

             NEUTROPHIL % (test code = NT%) 87.5 %       56.0-77.0    H         

    

 

             IMMATURE GRANULOCYTE % (test code = IG%) 0.6 %        0.0-2.0      

N             

 

             LYMPHOCYTE % (test code = LY%) 5.9 %        14.0-32.0    L         

    

 

             MONOCYTE % (test code = MO%) 5.6 %        4.8-9.0      N           

  

 

             EOSINOPHIL % (test code = EO%) 0.1 %        0.3-3.7      L         

    

 

             BASOPHIL % (test code = BA%) 0.3 %        0.0-2.0      N           

  

 

             NUCLEATED RBC % (test code = NRBC%) 0.3 %        0-0          H    

         

 

             NEUTROPHIL # (test code = NT#) 6.87 x10 3/uL 2.0-7.6      N        

     

 

             IMMATURE GRANULOCYTE # (test code = IG#) 0.05 x10 3/uL 0.00-0.03   

 H             

 

             LYMPHOCYTE # (test code = LY#) 0.46 x10 3/uL 1.0-3.8      L        

     

 

             MONOCYTE # (test code = MO#) 0.44 x10 3/uL 0.1-0.8      N          

   

 

             EOSINOPHIL # (test code = EO#) 0.01 x10 3/uL 0.0-0.2      N        

     

 

             BASOPHIL # (test code = BA#) 0.02 x10 3/uL 0.0-0.2      N          

   

 

             NUCLEATED RBC # (test code = NRBC#) 0.02 x10 3/uL 0.0-0.1      N   

          

 

             MANUAL DIFF REQUIRED (test code = MDIFF) NO                        

              





COMPREHENSIVE METABOLIC [id number] 09:02:00* 



             Test Item    Value        Reference Range Interpretation Comments

 

             SODIUM (test code = NA) 142 mEq/L    134-147      N             

 

             POTASSIUM (test code = K) 3.5 mEq/L    3.4-5.0      N             

 

             CHLORIDE (test code = CL) 108 mEq/L    100-108      N             

 

             CARBON DIOXIDE (test code = CO2) 25 mEq/L     21-33        N       

      

 

             ANION GAP (test code = GAP) 13           0-20         N            

 

 

             GLUCOSE (test code = GLU) 111 mg/dL           H             

 

             BLOOD UREA NITROGEN (test code = BUN) 34 mg/dL     7-18         H  

           

 

             GLOMERULAR FILTRATION RATE (test code = GFR) 20.8         70-80    

    L            Units of measure = 

ml/min/1.73 m2

 

             CREATININE (test code = CREAT) 3.0 mg/dL    0.6-1.3      H         

    

 

             TOTAL PROTEIN (test code = PROT) 6.1 g/dL     6.4-8.2      L       

      

 

             ALBUMIN (test code = ALB) 2.50 g/dL    3.4-5.0      L             

 

             CALCIUM (test code = CA) 8.3 mg/dL    8.0-10.5     N             

 

             BILIRUBIN TOTAL (test code = BILT) 0.4 MG/DL    <1.5               

        

 

             SGOT/AST (test code = AST) 11 IUnit/L   15-37        L             

 

             SGPT/ALT (test code = ALT) 10 IUnit/L   15-65        L             

 

             ALKALINE PHOSPHATASE TOTAL (test code = ALKP) 70 IUnit/L     

     N             





LIPID PROFILE (CORONARY RISK)2020 09:02:00* 



             Test Item    Value        Reference Range Interpretation Comments

 

             TRIGLYCERIDES (test code = TRIG) 85 mg/dL            N       

      

 

             CHOLESTEROL (test code = CHOL) 145 mg/dL    <200                   

    

 

             CHOLESTEROL/HDL RATIO (test code = CHOLHDL) 5.00 RATIO   3.43-4.97 

   H            RISK 

ASSOCIATED WITH CHOL/HDL RATIOS:   RISK            MALE       FEMALE1/2 AVERAGE 
      3.43        3.27AVERAGE            4.97        4.442X AVERAGE         9.55
       7.053X AVERAGE         23.39      11.04 NOTE THAT THE REFERENCE VALUE IS 
RELATEDTO RISK LEVELS AS RECOMMENDED BY THE NATL.HEART, LUNG, AND BLOOD INST.

 

             HDL CHOLESTEROL (test code = HDL) 29.0 mg/dL   32-72        L      

       

 

             LIPOPROTEIN LDL (test code = LDL) 89 mg/dL     0-100        N      

      <100     [id number]  NEAR

OPTIMAL/ABOVE [id number]  [id number]  HIGH>LL=193  VERY 
HIGH*Guidelines provided by the National Cholesterol EducationProgram Adult 
Treatment Panel III





[id number] 09:02:00* 



             Test Item    Value        Reference Range Interpretation Comments

 

             PHOSPHOROUS (test code = PHOS) 4.0 MG/DL    2.5-4.9      N         

    





B-TYPE NATRIURETIC [id number] 15:53:00* 



             Test Item    Value        Reference Range Interpretation Comments

 

             B-TYPE NATRIURETIC PEPTIDE (test code = BNP) 1200.0 PG/ML 0-100    

    H             





HEPATIC FUNCTION [id number] 15:38:00* 



             Test Item    Value        Reference Range Interpretation Comments

 

             TOTAL PROTEIN (test code = PROT) 7.2 g/dL     6.4-8.2      N       

      

 

             ALBUMIN (test code = ALB) 2.90 g/dL    3.4-5.0      L             

 

             BILIRUBIN TOTAL (test code = BILT) 0.7 MG/DL    <1.5         N     

        

 

             BILIRUBIN DIRECT (test code = BILD) 0.20 MG/DL   0.0-0.30     N    

         

 

             BILIRUBIN INDIRECT (test code = BILIND) 0.50 MG/DL                 

             

 

             SGOT/AST (test code = AST) 16 IUnit/L   15-37        N             

 

             SGPT/ALT (test code = ALT) 14 IUnit/L   15-65        L             

 

             ALKALINE PHOSPHATASE TOTAL (test code = ALKP) 82 IUnit/L     

     N             





[id number] 15:38:00* 



             Test Item    Value        Reference Range Interpretation Comments

 

             MAGNESIUM (test code = MAG) 2.10 mg/dL   1.8-2.4      N            

 





TSH REFLEX TO [id number] 15:38:00* 



             Test Item    Value        Reference Range Interpretation Comments

 

             TSH REFLEX TO FT4 (test code = TSHREFLEX) 2.52 IU/mL   0.42-5.47   

 N             





[id number] 15:38:00* 



             Test Item    Value        Reference Range Interpretation Comments

 

             TROPONIN-I (test code = TROPI) < 0.015 ng/mL 0.000-0.045  N        

    Negative:            

<= 0.045 Positive:             >= 0.046 Correlation with serial results, other 
cardiac markers andclinical findings is necessary to determine the 
clinicalsignificance of this result. Results using different methodologies 
should not be comparedto one another as quantitative results may vary by method.





HEPATIC FUNCTION [id number] 15:32:00* 



             Test Item    Value        Reference Range Interpretation Comments

 

             TOTAL PROTEIN (test code = PROT) 7.2 g/dL     6.4-8.2      N       

      

 

             ALBUMIN (test code = ALB) 2.90 g/dL    3.4-5.0      L             

 

             BILIRUBIN TOTAL (test code = BILT) 0.7 MG/DL    <1.5         N     

        

 

             BILIRUBIN DIRECT (test code = BILD) 0.20 MG/DL   0.0-0.30     N    

         

 

             BILIRUBIN INDIRECT (test code = BILIND) 0.50 MG/DL                 

             

 

             SGOT/AST (test code = AST) 16 IUnit/L   15-37        N             

 

             SGPT/ALT (test code = ALT) 14 IUnit/L   15-65        L             

 

             ALKALINE PHOSPHATASE TOTAL (test code = ALKP) 82 IUnit/L     

     N             





[id number] 15:32:00* 



             Test Item    Value        Reference Range Interpretation Comments

 

             MAGNESIUM (test code = MAG) 2.10 mg/dL   1.8-2.4      N            

 





TSH REFLEX TO [id number] 15:32:00* 



             Test Item    Value        Reference Range Interpretation Comments

 

             TSH REFLEX TO FT4 (test code = TSHREFLEX)  IU/mL       0.42-5.47   

               





[id number] 15:32:00* 



             Test Item    Value        Reference Range Interpretation Comments

 

             TROPONIN-I (test code = TROPI) < 0.015 ng/mL 0.000-0.045  N        

    Negative:            

<= 0.045 Positive:             >= 0.046 Correlation with serial results, other 
cardiac markers andclinical findings is necessary to determine the 
clinicalsignificance of this result. Results using different methodologies 
should not be comparedto one another as quantitative results may vary by method.





PROTHROMBIN [id number] 15:30:00* 



             Test Item    Value        Reference Range Interpretation Comments

 

             PROTHROMBIN TIME PATIENT (test code = PTP) 12.5 SECONDS 9.3-12.9   

  N             

 

             INTERNATIONAL NORMAL RATIO (test code = INR) 1.1          0.8-1.2  

    N                            

TARGET INR BY INDICATION   Indication                                      INR1.
Prophylaxis of venous thrombosis             2.0 - 3.0   (orthopedic surgery), 
Prophylaxis of   venous thrombosis (other than high-risk   surgery), Treatment 
of Deep Vein   Thrombosis/Pulmonary Embolism, Prevention   of systemic embolism 
- Tissue heart valves,   Acute Myocardial Infarction (to prevent   systemic 
embolism), Valvular heart disease,   Atrial Fibrillation, Bileaflet mechanical  
valve in aortic position.2. Mechanical prosthetic valves (high risk),    2.5 - 
3.5   Presence of Lupus Anticoagulant or   Antiphospholipid Antibodies, 
Prevention of   systemic embolism - Acute Myocardial Infarction   (to prevent 
recurrent infarct).





THROMBOPLASTIN TIME [id number] 15:30:00* 



             Test Item    Value        Reference Range Interpretation Comments

 

             THROMBOPLASTIN TIME PARTIAL (test code = PTT) 31.4 Seconds 25.0-39.

5    N                

Therapeutic Range: 50.4 - 88.3 Seconds        **** Effective 2019 ****





CBC W/AUTO [id number] 15:24:00* 



             Test Item    Value        Reference Range Interpretation Comments

 

             WHITE BLOOD CELL (test code = WBC) 14.33 x10 3/uL 4.5-11.0     H   

          

 

             RED BLOOD CELL (test code = RBC) 3.69 x10 6/uL 4.00-5.60    L      

       

 

             HEMOGLOBIN (test code = HGB) 11.0 g/dL    12.5-16.9    L           

  

 

             HEMATOCRIT (test code = HCT) 36.4 %       37.5-50.7    L           

  

 

             MEAN CELL VOLUME (test code = MCV) 98.6 fL      81.0-99.0    N     

        

 

             MEAN CELL HGB (test code = MCH) 29.8 pg      27.0-33.0    N        

     

 

             MEAN CELL HGB CONCETRATION (test code = MCHC) 30.2 g/dL    33.0-37.

0    L             

 

             RED CELL DISTRIBUTION WIDTH CV (test code = RDW) 19.0 %       11.5-

14.5    H             

 

             RED CELL DISTRIBUTION WIDTH SD (test code = RDW-SD) 66.4 fL      37

.0-54.0    H             

 

             PLATELET COUNT (test code = PLT) 327 x10 3/uL 150-400      N       

      

 

             MEAN PLATELET VOLUME (test code = MPV) 9.8 fL       7.0-9.0      H 

            

 

             NEUTROPHIL % (test code = NT%) 77.3 %       56.0-77.0    H         

    

 

             IMMATURE GRANULOCYTE % (test code = IG%) 0.7 %        0.0-2.0      

N             

 

             LYMPHOCYTE % (test code = LY%) 8.9 %        14.0-32.0    L         

    

 

             MONOCYTE % (test code = MO%) 9.6 %        4.8-9.0      H           

  

 

             EOSINOPHIL % (test code = EO%) 3.1 %        0.3-3.7      N         

    

 

             BASOPHIL % (test code = BA%) 0.4 %        0.0-2.0      N           

  

 

             NUCLEATED RBC % (test code = NRBC%) 0.0 %        0-0          N    

         

 

             NEUTROPHIL # (test code = NT#) 11.08 x10 3/uL 2.0-7.6      H       

      

 

             IMMATURE GRANULOCYTE # (test code = IG#) 0.10 x10 3/uL 0.00-0.03   

 H             

 

             LYMPHOCYTE # (test code = LY#) 1.27 x10 3/uL 1.0-3.8      N        

     

 

             MONOCYTE # (test code = MO#) 1.38 x10 3/uL 0.1-0.8      H          

   

 

             EOSINOPHIL # (test code = EO#) 0.44 x10 3/uL 0.0-0.2      H        

     

 

             BASOPHIL # (test code = BA#) 0.06 x10 3/uL 0.0-0.2      N          

   

 

             NUCLEATED RBC # (test code = NRBC#) 0.00 x10 3/uL 0.0-0.1      N   

          

 

             MANUAL DIFF REQUIRED (test code = MDIFF) NO                        

              





TROPONIN-I [id number] 15:11:00* 



             Test Item    Value        Reference Range Interpretation Comments

 

             TROPONIN-I RAPID (test code = TROPIRAP) 0.03 ng/mL   0.00-0.08    N

            Performed by 

certified  at  [facility]          Negative:          <= 0.08   
      Positive:          >= 0.09An elevated troponin value alone is not 
sufficient todiagnose a myocardial infarction. Rather, the patient sclinical 
presentation (history, physical exam) and ECGshould be used in conjunction with 
troponin in thediagnostic evaluation of suspected myocardial infarction. Aserial
sampling protocol is recommended to facilitate the identification of temporal 
changes in troponin levels characteristic of MI.





- XR CHEST 1 -64-45 14:58:00 FAX:         Ebony Nelson  [phone]
   [place]:   St: PRE----------
---------------------------------------------------------------------  Name:   TY DOUGLAS                    [facility]                    : [birth year]  Age/S: 69/M           [address] [place] [address]         Unit #: [id number]    
 Loc: [address] [address]                 Phys: Ebony Nelson  
                                              Acct: [id number] Dis Date:      
        Status: PRE ER                                 PHONE #: [phone]    
Exam Date:     2020     1448                   FAX #: [phone]     
Reason: SOB                                                EXAMS:               
                               CPT CODE:      [id number] XR CHEST 1 V            
                  [address]28[address]                    PROCEDURE: CHEST SINGLE VIEW         
     INDICATION: Shortness of breath               COMPARISON: Multiple priors, 
spanning 2016 through 2019               FINDINGS:        TUBES AND
LINES: Right internal jugular hemodialysis catheter tip at       the level of 
upper right atrium.               CHEST: There are coarse reticular interstitial
opacities bilateral,       peripheral lung zone predominance, increased from 
prior exams.        Indistinct opacity in the right infrahilar region with subt
le air       bronchograms.  Blunting of the costophrenic angles.  No pneumothora
x.        The cardiomediastinal silhouette is stable and normal for projection. 
      No acute skeletal abnormality.                   IMPRESSION:          1.  
Bilateral interstitial opacities with airspace disease right         infrahilar 
lung.  Edema suspected cardiogenic and noncardiogenic         causes and inflam
mation in the differential.         2.  More chronic appearing reticular interst
itial opacities in the         peripheral lung zones with apparent progression o
f disease.          Noninfectious interstitial lung disease including pulmonary 
fibrosis         in the differential.         3.  Trace bilateral pleural effusi
ons.                             SL:  [id number]                   ** Electronically 
Signed by CRAIG Wilbrun **           **             on 2020 at 1458
            **                      Reported and signed by: SULAIMAN Gunn          CC: Ebony Coon
gist: Josselin Ramsey, RT(R)                               Trnscrd Date/Time/B
y: 2020 (3624) : By: Prachi           Orig Print D/T: S: 2020 (15
01)                         PAGE  1                       Signed Report         
                     CHEMISTRY 8 [id number] 14:54:00* 



             Test Item    Value        Reference Range Interpretation Comments

 

             ISTAT-SODIUM (test code = NAP)  MMOL/L      134-147                

    

 

             ISTAT-POTASSIUM (test code = KP)  MMOL/L      3.4-5.0              

      

 

             ISTAT-CHLORIDE (test code = CLP)  MMOL/L      100-108              

      

 

             ISTAT CARBON DIOXIDE (test code = ISTAT-CO2)  mmol/L      21-33    

    N             

 

             ISTAT CALCIUM IONIZED (test code = ISTAT-TASHIA)  MG/DL       1.12-1.3

2                  

 

             ISTAT-GLUCOSE (test code = GLUP)  MG/DL              N       

      

 

             ISTAT-BUN (test code = BUNP)  MG/DL       7-18         H           

  

 

             BEDSIDE CREATININE (test code = CREATBED)  MG/DL       0.6-1.3     

 H             

 

             GLOMERULAR FILTRATION RATE POC (test code = GFRBED) 29 ML/MIN      

                         





CHEMISTRY 8 [id number] 14:54:00* 



             Test Item    Value        Reference Range Interpretation Comments

 

             ISTAT-SODIUM (test code = NAP) 140 MMOL/L   134-147      N         

    

 

             ISTAT-POTASSIUM (test code = KP) 3.2 MMOL/L   3.4-5.0      L       

      

 

             ISTAT-CHLORIDE (test code = CLP) 99 MMOL/L    100-108      L       

     Performed by certified 

 at  [facility]

 

             ISTAT CARBON DIOXIDE (test code = ISTAT-CO2) 32.0 mmol/L  21-33    

    N             

 

             ISTAT CALCIUM IONIZED (test code = ISTAT-TASHIA) 1.12 MG/DL   1.12-1.3

2    N             

 

             ISTAT-GLUCOSE (test code = GLUP) 100 MG/DL           N       

      

 

             ISTAT-BUN (test code = BUNP) 24 MG/DL     7-18         H           

  

 

             BEDSIDE CREATININE (test code = CREATBED) 2.4 MG/DL    0.6-1.3     

 H             

 

             GLOMERULAR FILTRATION RATE POC (test code = GFRBED) 29 ML/MIN      

                         





LACTIC ACID [id number] 14:54:00* 



             Test Item    Value        Reference Range Interpretation Comments

 

             LACTIC ACID POC (test code = LACTP) 1.7 MMOL/L   0.90-1.70    N    

        Performed by 

certified  at  [facility]





COMPREHENSIVE METABOLIC [id number] 09:48:00* 



             Test Item    Value        Reference Range Interpretation Comments

 

             SODIUM (test code = NA) 135 mEq/L    134-147      N             

 

             POTASSIUM (test code = K) 3.2 mEq/L    3.4-5.0      L             

 

             CHLORIDE (test code = CL) 102 mEq/L    100-108      N             

 

             CARBON DIOXIDE (test code = CO2) 30 mEq/L     21-33        N       

      

 

             ANION GAP (test code = GAP) 6            0-20         N            

 

 

             GLUCOSE (test code = GLU) 96 mg/dL            N             

 

             BLOOD UREA NITROGEN (test code = BUN) 9 mg/dL      7-18            

           

 

             GLOMERULAR FILTRATION RATE (test code = GFR) 35.2         70-80    

    L            Units of measure = 

ml/min/1.73 m2

 

             CREATININE (test code = CREAT) 1.9 mg/dL    0.6-1.3      H         

    

 

             TOTAL PROTEIN (test code = PROT) 5.6 g/dL     6.4-8.2      L       

      

 

             ALBUMIN (test code = ALB) 1.80 g/dL    3.4-5.0      L             

 

             CALCIUM (test code = CA) 7.3 mg/dL    8.0-10.5     L             

 

             BILIRUBIN TOTAL (test code = BILT) 0.7 MG/DL    <1.5               

        

 

             SGOT/AST (test code = AST) 29 IUnit/L   15-37        N             

 

             SGPT/ALT (test code = ALT) 26 IUnit/L   15-65        N             

 

             ALKALINE PHOSPHATASE TOTAL (test code = ALKP) 74 IUnit/L     

     N             





CBC W/AUTO [id number] 09:33:00* 



             Test Item    Value        Reference Range Interpretation Comments

 

             WHITE BLOOD CELL (test code = WBC) 11.07 x10 3/uL 4.5-11.0     H   

          

 

             RED BLOOD CELL (test code = RBC) 2.86 x10 6/uL 4.00-5.60    L      

       

 

             HEMOGLOBIN (test code = HGB) 8.7 g/dL     12.5-16.9    L           

  

 

             HEMATOCRIT (test code = HCT) 25.6 %       37.5-50.7    L           

  

 

             MEAN CELL VOLUME (test code = MCV) 89.5 fL      81.0-99.0          

        

 

             MEAN CELL HGB (test code = MCH) 30.4 pg      27.0-33.0    N        

     

 

             MEAN CELL HGB CONCETRATION (test code = MCHC) 34.0 g/dL    33.0-37.

0    N             

 

             RED CELL DISTRIBUTION WIDTH CV (test code = RDW) 18.7 %       11.5-

14.5    H             

 

             RED CELL DISTRIBUTION WIDTH SD (test code = RDW-SD) 60.8 fL      37

.0-54.0    H             

 

             PLATELET COUNT (test code = PLT) 297 x10 3/uL 150-400      N       

      

 

             MEAN PLATELET VOLUME (test code = MPV) 9.1 fL       7.0-9.0      H 

            

 

             NEUTROPHIL % (test code = NT%) 61.4 %       56.0-77.0    N         

    

 

             IMMATURE GRANULOCYTE % (test code = IG%) 1.9 %        0.0-2.0      

N             

 

             LYMPHOCYTE % (test code = LY%) 14.0 %       14.0-32.0    N         

    

 

             MONOCYTE % (test code = MO%) 15.8 %       4.8-9.0      H           

  

 

             EOSINOPHIL % (test code = EO%) 6.2 %        0.3-3.7      H         

    

 

             BASOPHIL % (test code = BA%) 0.7 %        0.0-2.0      N           

  

 

             NUCLEATED RBC % (test code = NRBC%) 0.0 %        0-0          N    

         

 

             NEUTROPHIL # (test code = NT#) 6.79 x10 3/uL 2.0-7.6      N        

     

 

             IMMATURE GRANULOCYTE # (test code = IG#) 0.21 x10 3/uL 0.00-0.03   

 H             

 

             LYMPHOCYTE # (test code = LY#) 1.55 x10 3/uL 1.0-3.8      N        

     

 

             MONOCYTE # (test code = MO#) 1.75 x10 3/uL 0.1-0.8      H          

   

 

             EOSINOPHIL # (test code = EO#) 0.69 x10 3/uL 0.0-0.2      H        

     

 

             BASOPHIL # (test code = BA#) 0.08 x10 3/uL 0.0-0.2      N          

   

 

             NUCLEATED RBC # (test code = NRBC#) 0.00 x10 3/uL 0.0-0.1      N   

          

 

             MANUAL DIFF REQUIRED (test code = MDIFF) NO                        

              





COMPREHENSIVE METABOLIC [id number] 20:02:00* 



             Test Item    Value        Reference Range Interpretation Comments

 

             SODIUM (test code = NA) 138 mEq/L    134-147      N             

 

             POTASSIUM (test code = K) 2.9 mEq/L    3.4-5.0      LL            

 

             CHLORIDE (test code = CL) 101 mEq/L    100-108      N             

 

             CARBON DIOXIDE (test code = CO2) 31 mEq/L     21-33        N       

      

 

             ANION GAP (test code = GAP) 9            0-20         N            

 

 

             GLUCOSE (test code = GLU) 93 mg/dL            N             

 

             BLOOD UREA NITROGEN (test code = BUN) 5 mg/dL      7-18         L  

           

 

             GLOMERULAR FILTRATION RATE (test code = GFR) 66.2         70-80    

    L            Units of measure = 

ml/min/1.73 m2

 

             CREATININE (test code = CREAT) 1.1 mg/dL    0.6-1.3      N         

    

 

             TOTAL PROTEIN (test code = PROT) 6.6 g/dL     6.4-8.2      N       

      

 

             ALBUMIN (test code = ALB) 1.90 g/dL    3.4-5.0      L             

 

             CALCIUM (test code = CA) 7.7 mg/dL    8.0-10.5     L             

 

             BILIRUBIN TOTAL (test code = BILT) 0.4 MG/DL    <1.5         N     

        

 

             SGOT/AST (test code = AST) 44 IUnit/L   15-37        H             

 

             SGPT/ALT (test code = ALT) 31 IUnit/L   15-65        N             

 

             ALKALINE PHOSPHATASE TOTAL (test code = ALKP) 82 IUnit/L     

     N             





[id number] 20:02:00* 



             Test Item    Value        Reference Range Interpretation Comments

 

             TROPONIN-I (test code = TROPI) < 0.015 ng/mL 0.000-0.045  N        

    Negative:            

<= 0.045 Positive:             >= 0.046 Correlation with serial results, other 
cardiac markers andclinical findings is necessary to determine the 
clinicalsignificance of this result. Results using different methodologies 
should not be comparedto one another as quantitative results may vary by method.





CBC W/AUTO [id number] 19:19:00* 



             Test Item    Value        Reference Range Interpretation Comments

 

             WHITE BLOOD CELL (test code = WBC) 11.30 x10 3/uL 4.5-11.0     H   

          

 

             RED BLOOD CELL (test code = RBC) 2.19 x10 6/uL 4.00-5.60    L      

       

 

             HEMOGLOBIN (test code = HGB) 6.6 g/dL     12.5-16.9    L           

  

 

             HEMATOCRIT (test code = HCT) 20.5 %       37.5-50.7    L           

  

 

             MEAN CELL VOLUME (test code = MCV) 93.6 fL      81.0-99.0    N     

        

 

             MEAN CELL HGB (test code = MCH) 30.1 pg      27.0-33.0    N        

     

 

             MEAN CELL HGB CONCETRATION (test code = MCHC) 32.2 g/dL    33.0-37.

0    L             

 

             RED CELL DISTRIBUTION WIDTH CV (test code = RDW) 19.7 %       11.5-

14.5    H             

 

             RED CELL DISTRIBUTION WIDTH SD (test code = RDW-SD) 66.4 fL      37

.0-54.0    H             

 

             PLATELET COUNT (test code = PLT) 361 x10 3/uL 150-400      N       

      

 

             MEAN PLATELET VOLUME (test code = MPV) 9.5 fL       7.0-9.0      H 

            

 

             NEUTROPHIL % (test code = NT%) 61.3 %       56.0-77.0    N         

    

 

             IMMATURE GRANULOCYTE % (test code = IG%) 2.4 %        0.0-2.0      

H             

 

             LYMPHOCYTE % (test code = LY%) 15.0 %       14.0-32.0    N         

    

 

             MONOCYTE % (test code = MO%) 15.0 %       4.8-9.0      H           

  

 

             EOSINOPHIL % (test code = EO%) 5.8 %        0.3-3.7      H         

    

 

             BASOPHIL % (test code = BA%) 0.5 %        0.0-2.0      N           

  

 

             NUCLEATED RBC % (test code = NRBC%) 0.0 %        0-0          N    

         

 

             NEUTROPHIL # (test code = NT#) 6.92 x10 3/uL 2.0-7.6      N        

     

 

             IMMATURE GRANULOCYTE # (test code = IG#) 0.27 x10 3/uL 0.00-0.03   

 H             

 

             LYMPHOCYTE # (test code = LY#) 1.70 x10 3/uL 1.0-3.8      N        

     

 

             MONOCYTE # (test code = MO#) 1.69 x10 3/uL 0.1-0.8      H          

   

 

             EOSINOPHIL # (test code = EO#) 0.66 x10 3/uL 0.0-0.2      H        

     

 

             BASOPHIL # (test code = BA#) 0.06 x10 3/uL 0.0-0.2      N          

   

 

             NUCLEATED RBC # (test code = NRBC#) 0.00 x10 3/uL 0.0-0.1      N   

          

 

             MANUAL DIFF REQUIRED (test code = MDIFF) NO                        

              





TOTAL IRON BINDING [id number] 13:37:00* 



             Test Item    Value        Reference Range Interpretation Comments

 

             SERUM IRON (test code = IRON) 41 mcg/dL           N          

   

 

             TOTAL IRON BINDING CAPACITY (test code = TIBC) 83 mcg/dL    260-445

      L             

 

             UIBC (test code = UIBC) 42 mcg/dL                               

 

             IRON SATURATION (test code = FESAT) 49.4 %       14-34        H    

         





[id number] 13:37:00* 



             Test Item    Value        Reference Range Interpretation Comments

 

             FERRITIN (test code = ALEX) 1785.7 ng/mL 23.9-336.2   H             





RAPID PLASMA [id number] 10:00:00* 



             Test Item    Value        Reference Range Interpretation Comments

 

             RAPID PLASMA REAGIN (test code = RPR) NONREACTIVE  NONREACTIVE     

           





PROCALCITONIN (PCT)2019-10-30 09:20:00* 



             Test Item    Value        Reference Range Interpretation Comments

 

             PROCALCITONIN (PCT) (test code = PROCAL) 3.10 ng/mL   0.00-0.05    

H             PROCALCITONIN 

(PCT) NORMAL RANGE (ADULT): <0.05 NG/ML. * a concentration <0.5 ng/mL represents
a low risk of  severe sepsis and/or septic shock.* a concentration >2 ng/mL 
represents a high risk of severe  sepsis and/or septic shock.Nevertheless, 
concentrations <0.5 ng/mL do not exclude aninfection, on account of localized 
infections (withoutsystemic signs) which can be associated with such 
lowconcentrations, or a systemic infection in its initialstages (< 6 hours). 
Furthermore, increased procalcitonincan occur without infection. PCT 
concentrations between 0.5and 2.0 ng/mL should be interpreted taking into 
account thepatient's history. It is recommended to retest PCT within6-24 hours 
if any concentrations <2 ng/mL are obtained.





BASIC METABOLIC PANEL2019-10-30 07:57:00* 



             Test Item    Value        Reference Range Interpretation Comments

 

             SODIUM (test code = NA) 141 mEq/L    134-147      N             

 

             POTASSIUM (test code = K) 3.5 mEq/L    3.4-5.0      N             

 

             CHLORIDE (test code = CL) 105 mEq/L    100-108      N             

 

             CARBON DIOXIDE (test code = CO2) 27 mEq/L     21-33        N       

      

 

             ANION GAP (test code = GAP) 13           0-20         N            

 

 

             GLUCOSE (test code = GLU) 132 mg/dL           H             

 

             BLOOD UREA NITROGEN (test code = BUN) 28 mg/dL     7-18         H  

           

 

             GLOMERULAR FILTRATION RATE (test code = GFR) 14.5         70-80    

    L            Units of measure = 

ml/min/1.73 m2

 

             CREATININE (test code = CREAT) 4.1 mg/dL    0.6-1.3      H         

    

 

             CALCIUM (test code = CA) 7.5 mg/dL    8.0-10.5     L             





[id number] 07:57:00* 



             Test Item    Value        Reference Range Interpretation Comments

 

             PHOSPHOROUS (test code = PHOS) 3.2 MG/DL    2.5-4.9                

    





HGB   HCT2019-10-30 07:36:00* 



             Test Item    Value        Reference Range Interpretation Comments

 

             HEMOGLOBIN (test code = HGB) 7.6 g/dL     12.5-16.9    L           

  

 

             HEMATOCRIT (test code = HCT) 23.5 %       37.5-50.7    L           

  





ACUTE HEPATITIS PANEL2019-10-30 07:13:00* 



             Test Item    Value        Reference Range Interpretation Comments

 

             AB HEPATITIS A IGM (test code = HAVMAB) NON REACTIVE INDEX NON REAC

T.                 

 

             AG HEPATITIS B SURFACE (test code = HBSAG) NON REACTIVE INDEX NonRe

active                

 

             AB HEPATITIS B CORE IGM (test code = HBCMAB) NON REACTIVE INDEX NON

 REACT.                 

 

             AB HEPATITIS C (test code = HCVAB) NON REACTIVE INDEX NON REACT.   

              





AB HEPATITIS B SURFACE2019-10-30 07:13:00* 



             Test Item    Value        Reference Range Interpretation Comments

 

             AB HEPATITIS B SURFACE (test code = HBSAB) < 3.1 mIU/mL Immunity>9.

9 L              Status 

of Immunity                     Anti-HBs Level  ------------------              
      --------------Inconsistent with Immunity                   0.0 - 
9.9Consistent with Immunity                          >9.9Performed At:  
[facility] [id number] Kyle L MD 
Ph:[phone]





- MRI BRAIN W/O CONT2019-10-29 19:00:00 FAX: Aleksandr Cruz MD   
[phone]    [place]:   St: ADM FAX:         Sabas Michelle MD 
[phone]    FAX: Jonny Lerner MD   [phone]   
------------------------------------------------------------------------------- 
Name:   TY PORTER                    [facility]                    :
1950  Age/S: 69/M           [address] [place] [address]         Unit #: 
[id number]      Loc: [address] [place] [address]                 Phys: 
Sabas Michelle MD                                                Acct: [id number]
[id number] Dis Date:               Status: ADM IN                                 
ONE #: [phone]     Exam Date:     10/29/2019     181                   FAX
#: [phone]     Reason: AMS                                                
EXAMS:                                               CPT CODE:      [id number] MR
I BRAIN W/O CONT                         67111                    Clinical Indic
ation: Altered mental status.               Comparison: CT brain study dated 10/
.               TECHNIQUE: MRI of the brain is performed without gadolini
um contrast       with axial T1, T2, FLAIR and diffusion weighted imaging along 
with       sagittal T2, and coronal T1 weighted imaging.               FINDINGS:
Limited evaluation due to motion artifacts.               BRAIN PARENCHYMA: The
re is mild generalized cortical atrophy. Moderate        nonspecific periventric
ular white matter foci of increased T2 and       FLAIR signal are seen, likely r
elated to small vessel disease. There       is no mass effect or midline shift. 
There are no extra-axial fluid       collection, or intraparenchymal hemorrhage.
The corpus callosum       appears normal. There is no diffusion weighted imaging
or ADC map       abnormality to suggest acute/subacute ischemia. . Punctate re
mote       microhemorrhages along the right temporal operculum and  left lateral
      aspect of the justin.               CEREBELLOPONTINE REGIONS AND SKULL BASE:
The cerebellopontine angles       appear unremarkable. The skull base, cranioc
ervical junction, and       brainstem region are normal.  The optic chiasm is no
rmal. The sellar       and pineal regions are unremarkable.               VENTRI
CLES: The ventricles are normal in size and configuration. The       basilar cis
terns are normal.                VESSELS: The venous sinuses are grossly unremar
kable. The expected       intracranial flow voids are present.               ORB
ITS, VISUALIZED PARANASAL SINUSES AND MASTOIDS: The visualized       orbits and 
paranasal sinuses are unremarkable.  Patchy bilateral       mastoid effusions, l
eft more than the right.                 IMPRESSION: Limited evaluation due to m
otion artifacts.                   1. No acute intracranial abnormality without 
acute stroke, hemorrhage         or mass.                   2. Punctate remote m
icrohemorrhages in the right temporal operculum         and left lateral aspect 
of the justin.               PAGE  1                       Signed Report          
         (CONTINUED)  FAX: Aleksandr Cruz MD   [phone]    [place]: 
  St: ADM FAX:         Sabas Michelle [phone]    FAX: Jonny Mendez MD   [phone]   ------------------------------------------------
-------------------------------  Name:   TY PORTER                    [facility]                    : 1950  Age/S: 69/M           [address] [place] [address]         Unit #: [id number]      Loc: [address]       [place] [address] 
               Phys: Sabas Michelle MD                                       
        Acct: [id number] Dis Date:               Status: ADM IN               
                 PHONE #: [phone]     Exam Date:     10/29/2019     181   
               FAX #: [phone]     Reason: AMS                              
                 EXAMS:                                               CPT CODE: 
    [id number] MRI BRAIN W/O CONT                         [address]327               <
Continued>          3.  Generalized brain parenchymal atrophy with associated 
mild         periventricular white matter disease changes/small vessel disease. 
                 4.  Patchy bilateral mastoid effusions, left more than the 
right.                   SL:  GILBERT                   ** Electronically 
Signed by CRAIG Gray **           **              on 10/29/2019 at 
1900             **                      Reported and signed by: Alice Gray M.D.                              CC: Aleksandr Sanchez MD; Sabas Michelle MD; Jonny Love MD                                                 
                             Technologist: Rodrigue Nolan, RT(R)(CT)             
                 Trnscrd Date/Time/By: 10/29/2019 () : By: [email or web address]        
  Orig Print D/T: S: 10/29/2019 ()                         PAGE  2          
            Signed Report                               CBC W/AUTO DIFF
2019-10-29 11:27:00* 



             Test Item    Value        Reference Range Interpretation Comments

 

             WHITE BLOOD CELL (test code = WBC) 6.59 x10 3/uL 4.5-11.0     N    

         

 

             RED BLOOD CELL (test code = RBC) 2.71 x10 6/uL 4.00-5.60    L      

       

 

             HEMOGLOBIN (test code = HGB) 7.8 g/dL     12.5-16.9    L           

  

 

             HEMATOCRIT (test code = HCT) 23.8 %       37.5-50.7    L           

  

 

             MEAN CELL VOLUME (test code = MCV) 87.8 fL      81.0-99.0          

        

 

             MEAN CELL HGB (test code = MCH) 28.8 pg      27.0-33.0    N        

     

 

             MEAN CELL HGB CONCETRATION (test code = MCHC) 32.8 g/dL    33.0-37.

0    L             

 

             RED CELL DISTRIBUTION WIDTH CV (test code = RDW) 19.4 %       11.5-

14.5    H             

 

             RED CELL DISTRIBUTION WIDTH SD (test code = RDW-SD) 61.2 fL      37

.0-54.0    H             

 

             PLATELET COUNT (test code = PLT) 127 x10 3/uL 150-400      L       

      

 

             MEAN PLATELET VOLUME (test code = MPV) 11.3 fL      7.0-9.0      H 

            

 

             NEUTROPHIL % (test code = NT%) 75.0 %       56.0-77.0    N         

    

 

             IMMATURE GRANULOCYTE % (test code = IG%) 1.7 %        0.0-2.0      

N             

 

             LYMPHOCYTE % (test code = LY%) 16.4 %       14.0-32.0    N         

    

 

             MONOCYTE % (test code = MO%) 6.7 %        4.8-9.0      N           

  

 

             EOSINOPHIL % (test code = EO%) 0.0 %        0.3-3.7      L         

    

 

             BASOPHIL % (test code = BA%) 0.2 %        0.0-2.0      N           

  

 

             NUCLEATED RBC % (test code = NRBC%) 0.0 %        0-0          N    

         

 

             NEUTROPHIL # (test code = NT#) 4.95 x10 3/uL 2.0-7.6      N        

     

 

             IMMATURE GRANULOCYTE # (test code = IG#) 0.11 x10 3/uL 0.00-0.03   

 H             

 

             LYMPHOCYTE # (test code = LY#) 1.08 x10 3/uL 1.0-3.8      N        

     

 

             MONOCYTE # (test code = MO#) 0.44 x10 3/uL 0.1-0.8      N          

   

 

             EOSINOPHIL # (test code = EO#) 0.00 x10 3/uL 0.0-0.2      N        

     

 

             BASOPHIL # (test code = BA#) 0.01 x10 3/uL 0.0-0.2      N          

   

 

             NUCLEATED RBC # (test code = NRBC#) 0.00 x10 3/uL 0.0-0.1      N   

          

 

             MANUAL DIFF REQUIRED (test code = MDIFF) NO                        

             SLIDE REVIEWED, CONSISTENT WITH 

AUTO DIFF.





CBC W/AUTO DIFF2019-10-29 09:19:00* 



             Test Item    Value        Reference Range Interpretation Comments

 

             WHITE BLOOD CELL (test code = WBC) 6.59 x10 3/uL 4.5-11.0     N    

         

 

             RED BLOOD CELL (test code = RBC) 2.71 x10 6/uL 4.00-5.60    L      

       

 

             HEMOGLOBIN (test code = HGB) 7.8 g/dL     12.5-16.9    L           

  

 

             HEMATOCRIT (test code = HCT) 23.8 %       37.5-50.7    L           

  

 

             MEAN CELL VOLUME (test code = MCV) 87.8 fL      81.0-99.0          

        

 

             MEAN CELL HGB (test code = MCH) 28.8 pg      27.0-33.0    N        

     

 

             MEAN CELL HGB CONCETRATION (test code = MCHC) 32.8 g/dL    33.0-37.

0    L             

 

             RED CELL DISTRIBUTION WIDTH CV (test code = RDW) 19.4 %       11.5-

14.5    H             

 

             RED CELL DISTRIBUTION WIDTH SD (test code = RDW-SD) 61.2 fL      37

.0-54.0    H             

 

             PLATELET COUNT (test code = PLT) 127 x10 3/uL 150-400      L       

      

 

             MEAN PLATELET VOLUME (test code = MPV) 11.3 fL      7.0-9.0      H 

            

 

             NEUTROPHIL % (test code = NT%)  %           56.0-77.0              

    

 

             LYMPHOCYTE % (test code = LY%)  %           14.0-32.0              

    

 

             NEUTROPHIL # (test code = NT#)  x10 3/uL    2.0-7.6                

    

 

             LYMPHOCYTE # (test code = LY#)  x10 3/uL    1.0-3.8                

    

 

             MANUAL DIFF REQUIRED (test code = MDIFF)                           

              





BASIC METABOLIC PANEL2019-10-29 08:09:00* 



             Test Item    Value        Reference Range Interpretation Comments

 

             SODIUM (test code = NA) 141 mEq/L    134-147      N             

 

             POTASSIUM (test code = K) 3.3 mEq/L    3.4-5.0      L             

 

             CHLORIDE (test code = CL) 106 mEq/L    100-108      N             

 

             CARBON DIOXIDE (test code = CO2) 27 mEq/L     21-33        N       

      

 

             ANION GAP (test code = GAP) 11           0-20         N            

 

 

             GLUCOSE (test code = GLU) 70 mg/dL            N             

 

             BLOOD UREA NITROGEN (test code = BUN) 14 mg/dL     7-18            

           

 

             GLOMERULAR FILTRATION RATE (test code = GFR) 23.5         70-80    

    L            Units of measure = 

ml/min/1.73 m2

 

             CREATININE (test code = CREAT) 2.7 mg/dL    0.6-1.3      H         

    

 

             CALCIUM (test code = CA) 7.1 mg/dL    8.0-10.5     L             





[id number] 08:09:00* 



             Test Item    Value        Reference Range Interpretation Comments

 

             PHOSPHOROUS (test code = PHOS) 1.5 MG/DL    2.5-4.9      L         

    





- CT HEAD/BRAIN W/O CONT2019-10-28 22:35:00  Name: TY PORTER                
    [facility]                    : 1950 Age/S: 69  / M         
[address] [place] [address] Bl         Unit #: [id number]     Loc:               
[place] [address]                 Phys: Jonny Love MD                      
                           Acct: [id number]  Dis Date:               Status: 
ADM IN                                  PHONE #: [phone]     Exam Date: 
10/28/2019  2217                     FAX #: [phone]      Reason: ORDERED BY
DR FLOREZ                                EXAMS:                                 
             CPT CODE:      [id number] CT HEAD/BRAIN W/O CONT                    
[address]                    Clinical Indication: Hypoxia with shortness of breath; 
      Comparison: CT head 10/24/2019               TECHNIQUE: CT images were 
obtained from the foramen magnum to the       vertex without the use of 
intravenous contrast on a multidetector CT.       Coronal and sagittal 
reconstructions were obtained.          CT radiation dose DLP: 584 mGy-cm       
       FINDINGS:               BRAIN PARENCHYMA: Diffuse age-related brain 
atrophy is present.  There       are normal gray-white interfaces, sulci and 
gyri. There are no focal       mass lesions on this noncontrast head CT. There 
is no mass effect,       midline shift or edema. There are no intra-axial or 
extra-axial fluid       collections, intraventricular or intraparenchymal hemorr
abril. The       pineal, sellar, brainstem, cerebellum and skull base regions saul
ear       unremarkable.               Mild nonspecific deep white matter and per
iventricular white matter       disease changes are noted.               VENTRIC
LES: The lateral ventricles, third and fourth ventricles appear       unremarkab
le. The basilar cisterns are normal.                ORBITS, MASTOIDS AND PARANAS
AL SINUSES: The visualized orbits and       paranasal sinuses are unremarkable. 
The mastoid air cells are clear.                SKULL: There are no osseous abno
rmalities.               If there is further concern for intracranial pathology 
or acute       stroke, MRI of the brain may be performed for complete assessment
.                 IMPRESSION:          1. Unremarkable noncontrast head CT with 
no mass, hemorrhage or         subacute stroke.         2. Diffuse age-related b
rain atrophy with mild chronic age-related and         small vessel ischemic jyoti
nges.                                        SL:  [id number]         PAGE  1       
               Signed Report                    (CONTINUED)   Name: TY PORTER                     [facility]                    : 1950 Age/S: 
69  / M         [address] [place] [address]         Unit #: [id number]     Loc:     
         [place] [address]                 Phys: Jonny Love MD             
                                    Acct: [id number]  Dis Date:               
Status: ADM IN                                  PHONE #: [phone]     Exam 
Date: 10/28/2019  2217                     FAX #: [phone]      Reason: OR
DERED BY DR FLOREZ                                EXAMS:                        
                      CPT CODE:      [id number] CT HEAD/BRAIN W/O CONT           
         [address]               <Continued>      ** Electronically Signed by CRAIG Posadas on 10/28/2019 at 2235 **                      Reported and signed 
by: oJrge Posadas M.D.                                       CC: Aleksandr Sanchez MD; Jonny Love MD                                                           
                                        Technologist:Luke Salinas, RT(R)(CT)   
    CTDI:        DLP:        Trnscb Date/Time: 10/28/2019 () [email or web address]      
                 Orig Print D/T: S: 10/28/2019 (7519)      PAGE  2              
        Signed Report                               GLUBED2019-10-28 19:32:00* 



             Test Item    Value        Reference Range Interpretation Comments

 

             GLUBED (test code = GLUBED) 87 MG/DL            N            

Performed by certified  at

 [facility]





ACUTE HEPATITIS PANEL2019-10-28 16:42:00* 



             Test Item    Value        Reference Range Interpretation Comments

 

             AB HEPATITIS A IGM (test code = HAVMAB) NON REACTIVE INDEX NON REAC

T.                 

 

             AG HEPATITIS B SURFACE (test code = HBSAG) NON REACTIVE INDEX NonRe

active                

 

             AB HEPATITIS B CORE IGM (test code = HBCMAB) NON REACTIVE INDEX NON

 REACT.                 

 

             AB HEPATITIS C (test code = HCVAB) NON REACTIVE INDEX NON REACT.   

              





AB HEPATITIS B SURFACE2019-10-28 16:42:00* 



             Test Item    Value        Reference Range Interpretation Comments

 

             AB HEPATITIS B SURFACE (test code = HBSAB)                         

                





PROCALCITONIN (PCT)2019-10-28 09:21:00* 



             Test Item    Value        Reference Range Interpretation Comments

 

             PROCALCITONIN (PCT) (test code = PROCAL) 3.85 ng/mL   0.00-0.05    

H             PROCALCITONIN 

(PCT) NORMAL RANGE (ADULT): <0.05 NG/ML. * a concentration <0.5 ng/mL represents
a low risk of  severe sepsis and/or septic shock.* a concentration >2 ng/mL 
represents a high risk of severe  sepsis and/or septic shock.Nevertheless, 
concentrations <0.5 ng/mL do not exclude aninfection, on account of localized 
infections (withoutsystemic signs) which can be associated with such 
lowconcentrations, or a systemic infection in its initialstages (< 6 hours). 
Furthermore, increased procalcitonincan occur without infection. PCT 
concentrations between 0.5and 2.0 ng/mL should be interpreted taking into 
account thepatient's history. It is recommended to retest PCT within6-24 hours 
if any concentrations <2 ng/mL are obtained.





- CT CHEST W/O CONTRAST2019-10-27 10:59:00  Name: TY PORTER                 
   [facility]                    : 1950 Age/S: 69  / M         [address] [place] [address] Blvd         Unit #: [id number]     Loc:               [place] [address]                 Phys: Joaqiuna Ramírez MD                               
                  Acct: [id number]  Dis Date:               Status: ADM IN    
                             PHONE #: [phone]     Exam Date: 10/27/2019  
0939                     FAX #: [phone]      Reason: pneumonia             
                             EXAMS:                                             
 CPT CODE:      [id number] CT CHEST W/O CONTRAST                      [address]      
             Patient: TY PORTER.       : 1950;   Age: 69 years;   
Gender: Male.       MR: [id number].       Ordering physician: Joaquina Ramírez MD.               PROCEDURE: CT CHEST WITHOUT CONTRAST.               
INDICATION: Pneumonia.               COMPARISON: CTA chest 2019.           
   TECHNIQUE: Noncontrasted helical imaging performed apices through the       
lung bases with multiplanar reconstructions.               All CT scans at this 
location are performed using dose optimization       technique as appropriate to
a performed exam including the following:       -Automated exposure control.    
  -Adjustment of mA and/or KV according to patient's size (this includes       
techniques or standardized protocols for targeted exams where dose is       
matched to indication/reason for exam; i.e. extremities or head).       -Use of 
iterative reconstruction technique.       -Total DLP: 571.20 mGy-cm             
 FINDINGS:        LUNGS: Emphysematous change again noted, worst in bilateral 
upper       lobes.  Scattered bilateral upper lobe bronchiectasis noted, worse  
    since the previous examination.  Multifocal left worse than right       
upper lobe consolidative and groundglass airspace disease are also       worse 
since the previous examination, compatible with multifocal left       worse than
right pneumonic infiltrates.  Infiltrate may also be       present in the apical
segment of left lower lobe.  Minimal left       basilar atelectasis noted.  
Bilateral calcified granulomas noted.  No       pleural effusion or 
pneumothorax.               MEDIASTINUM: Heart is normal in size.  Aneurysmal 
dilatation of the       ascending thoracic aorta noted measuring up to 4.3 cm in
maximum       diameter.  No pericardial effusion.  Tip of right IJ venous c
atheter       is in the SVC.  Multiple enlarged mediastinal lymph nodes are like
ly       reactive.  Difficult to evaluate for hilar lymphadenopathy secondary   
   to absence of IV contrast.               UPPER ABDOMEN: Partially imaged flu
id distended gallbladder without       calcified gallstones.               MUSCU
LOSKELETAL: The skeleton is intact.  Bilateral enlarged axillary     PAGE  1    
                  Signed Report                    (CONTINUED)   Name: LOGAN PORTER
NNJAIME O                     [facility]                    : 1950 Age
/S: 69  / M         [address] [place] [address]         Unit #: [id number]     Loc: 
             [place] [address]                 Phys: Joaquina Ramírez MD         
                                        Acct: [id number]  Dis Date:           
   Status: ADM IN                                  PHONE #: [phone]     
Exam Date: 10/27/2019  0939                     FAX #: [phone]      Reason:
pneumonia                                           EXAMS:                      
                        CPT CODE:      [id number] CT CHEST W/O CONTRAST          
           [address]               <Continued>        lymph nodes are also likely 
reactive.                 IMPRESSION:          1.  Emphysema.  Worsening of 
bilateral upper lobe bronchiectasis.                   2.  Worsening of 
multifocal left worse than right consolidative and         groundglass pneumonic
infiltrates as described.  Possible infiltrate         in the apical segment of 
left lower lobe.                   3.  Aneurysmal dilatation of ascending 
thoracic aorta measuring 4.3 cm         in maximum diameter.                   
4.  Mediastinal and bilateral axillary lymphadenopathy, likely         reactive.
                  5.  Partially imaged fluid distended gallbladder without 
calcified         gallstones.                   SL: [id number]          ** 
Electronically Signed by CRAIG Jimenez on 10/27/2019 at 1059 **                   
  Reported and signed by: Joss Jimenez M.D.                   CC: Aleksandr Sanchez MD; 
Joaquina Ramírez MD; Jonny Love MD                                            
                                    Technologist:Naida Montesinos, RT(R)(CT)    
CTDI:        DLP:        Trnscb Date/Time: 10/27/2019 (1050) [facility].VGE/[facility].VGE
             Orig Print D/T: S: 10/27/2019 (1436)      PAGE  2                  
    Signed Report                               CBC W/AUTO DIFF2019-10-27 
10:12:00* 



             Test Item    Value        Reference Range Interpretation Comments

 

             WHITE BLOOD CELL (test code = WBC) 8.96 x10 3/uL 4.5-11.0     N    

         

 

             RED BLOOD CELL (test code = RBC) 2.29 x10 6/uL 4.00-5.60    L      

       

 

             HEMOGLOBIN (test code = HGB) 6.8 g/dL     12.5-16.9    L           

  

 

             HEMATOCRIT (test code = HCT) 22.0 %       37.5-50.7    L           

  

 

             MEAN CELL VOLUME (test code = MCV) 96.1 fL      81.0-99.0          

        

 

             MEAN CELL HGB (test code = MCH) 29.7 pg      27.0-33.0    N        

     

 

             MEAN CELL HGB CONCETRATION (test code = MCHC) 30.9 g/dL    33.0-37.

0    L             

 

             RED CELL DISTRIBUTION WIDTH CV (test code = RDW) 17.7 %       11.5-

14.5    H             

 

             RED CELL DISTRIBUTION WIDTH SD (test code = RDW-SD) 62.1 fL      37

.0-54.0    H             

 

             PLATELET COUNT (test code = PLT) 185 x10 3/uL 150-400      N       

      

 

             MEAN PLATELET VOLUME (test code = MPV) 11.0 fL      7.0-9.0      H 

            

 

             NEUTROPHIL % (test code = NT%) 81.8 %       56.0-77.0    H         

    

 

             IMMATURE GRANULOCYTE % (test code = IG%) 1.6 %        0.0-2.0      

N             

 

             LYMPHOCYTE % (test code = LY%) 10.6 %       14.0-32.0    L         

    

 

             MONOCYTE % (test code = MO%) 5.9 %        4.8-9.0      N           

  

 

             EOSINOPHIL % (test code = EO%) 0.0 %        0.3-3.7      L         

    

 

             BASOPHIL % (test code = BA%) 0.1 %        0.0-2.0      N           

  

 

             NUCLEATED RBC % (test code = NRBC%) 0.0 %        0-0          N    

         

 

             NEUTROPHIL # (test code = NT#) 7.33 x10 3/uL 2.0-7.6      N        

     

 

             IMMATURE GRANULOCYTE # (test code = IG#) 0.14 x10 3/uL 0.00-0.03   

 H             

 

             LYMPHOCYTE # (test code = LY#) 0.95 x10 3/uL 1.0-3.8      L        

     

 

             MONOCYTE # (test code = MO#) 0.53 x10 3/uL 0.1-0.8      N          

   

 

             EOSINOPHIL # (test code = EO#) 0.00 x10 3/uL 0.0-0.2      N        

     

 

             BASOPHIL # (test code = BA#) 0.01 x10 3/uL 0.0-0.2      N          

   

 

             NUCLEATED RBC # (test code = NRBC#) 0.00 x10 3/uL 0.0-0.1      N   

          

 

             MANUAL DIFF REQUIRED (test code = MDIFF) NO                        

             SLIDE REVIEWED, CONSISTENT WITH 

AUTO DIFF.





PROCALCITONIN (PCT)2019-10-27 09:46:00* 



             Test Item    Value        Reference Range Interpretation Comments

 

             PROCALCITONIN (PCT) (test code = PROCAL) 3.80 ng/mL   0.00-0.05    

H             PROCALCITONIN 

(PCT) NORMAL RANGE (ADULT): <0.05 NG/ML. * a concentration <0.5 ng/mL represents
a low risk of  severe sepsis and/or septic shock.* a concentration >2 ng/mL 
represents a high risk of severe  sepsis and/or septic shock.Nevertheless, 
concentrations <0.5 ng/mL do not exclude aninfection, on account of localized 
infections (withoutsystemic signs) which can be associated with such 
lowconcentrations, or a systemic infection in its initialstages (< 6 hours). 
Furthermore, increased procalcitonincan occur without infection. PCT 
concentrations between 0.5and 2.0 ng/mL should be interpreted taking into 
account thepatient's history. It is recommended to retest PCT within6-24 hours 
if any concentrations <2 ng/mL are obtained.





COMPREHENSIVE METABOLIC PANEL2019-10-27 07:34:00* 



             Test Item    Value        Reference Range Interpretation Comments

 

             SODIUM (test code = NA) 140 mEq/L    134-147      N             

 

             POTASSIUM (test code = K) 3.9 mEq/L    3.4-5.0      N             

 

             CHLORIDE (test code = CL) 107 mEq/L    100-108      N             

 

             CARBON DIOXIDE (test code = CO2) 24 mEq/L     21-33        N       

      

 

             ANION GAP (test code = GAP) 13           0-20         N            

 

 

             GLUCOSE (test code = GLU) 69 mg/dL            L             

 

             BLOOD UREA NITROGEN (test code = BUN) 29 mg/dL     7-18         H  

           

 

             GLOMERULAR FILTRATION RATE (test code = GFR) 14.9         70-80    

    L            Units of measure = 

ml/min/1.73 m2

 

             CREATININE (test code = CREAT) 4.0 mg/dL    0.6-1.3      H         

    

 

             TOTAL PROTEIN (test code = PROT) 4.2 g/dL     6.4-8.2      L       

      

 

             ALBUMIN (test code = ALB) 1.50 g/dL    3.4-5.0      L             

 

             CALCIUM (test code = CA) 7.3 mg/dL    8.0-10.5     L             

 

             BILIRUBIN TOTAL (test code = BILT) 0.4 MG/DL    <1.5         N     

        

 

             SGOT/AST (test code = AST) 21 IUnit/L   15-37        N             

 

             SGPT/ALT (test code = ALT) 25 IUnit/L   15-65        N             

 

             ALKALINE PHOSPHATASE TOTAL (test code = ALKP) 76 IUnit/L     

     N             





LIPID PROFILE (CORONARY RISK)2019-10-27 07:34:00* 



             Test Item    Value        Reference Range Interpretation Comments

 

             TRIGLYCERIDES (test code = TRIG) 196 mg/dL           H       

      

 

             CHOLESTEROL (test code = CHOL) 91 mg/dL     <200                   

    

 

             CHOLESTEROL/HDL RATIO (test code = CHOLHDL) 5.35 RATIO   3.43-4.97 

   H            RISK 

ASSOCIATED WITH CHOL/HDL RATIOS:   RISK            MALE       FEMALE1/2 AVERAGE 
      3.43        3.27AVERAGE            4.97        4.442X AVERAGE         9.55
       7.053X AVERAGE         23.39      11.04 NOTE THAT THE REFERENCE VALUE IS 
RELATEDTO RISK LEVELS AS RECOMMENDED BY THE NATL.HEART, LUNG, AND BLOOD INST.

 

             HDL CHOLESTEROL (test code = HDL) 17.0 mg/dL   32-72        L      

       

 

             LIPOPROTEIN LDL (test code = LDL) 49 mg/dL     0-100        N      

      <100     [id number]  NEAR

OPTIMAL/ABOVE [id number]  [id number]  HIGH>SV=445  VERY 
HIGH*Guidelines provided by the National Cholesterol EducationProgram Adult 
Treatment Panel III





CBC W/AUTO DIFF2019-10-27 07:26:00* 



             Test Item    Value        Reference Range Interpretation Comments

 

             WHITE BLOOD CELL (test code = WBC) 8.96 x10 3/uL 4.5-11.0     N    

         

 

             RED BLOOD CELL (test code = RBC) 2.29 x10 6/uL 4.00-5.60    L      

       

 

             HEMOGLOBIN (test code = HGB) 6.8 g/dL     12.5-16.9    L           

  

 

             HEMATOCRIT (test code = HCT) 22.0 %       37.5-50.7    L           

  

 

             MEAN CELL VOLUME (test code = MCV) 96.1 fL      81.0-99.0          

        

 

             MEAN CELL HGB (test code = MCH) 29.7 pg      27.0-33.0    N        

     

 

             MEAN CELL HGB CONCETRATION (test code = MCHC) 30.9 g/dL    33.0-37.

0    L             

 

             RED CELL DISTRIBUTION WIDTH CV (test code = RDW) 17.7 %       11.5-

14.5    H             

 

             RED CELL DISTRIBUTION WIDTH SD (test code = RDW-SD) 62.1 fL      37

.0-54.0    H             

 

             PLATELET COUNT (test code = PLT) 185 x10 3/uL 150-400      N       

      

 

             MEAN PLATELET VOLUME (test code = MPV) 11.0 fL      7.0-9.0      H 

            

 

             NEUTROPHIL % (test code = NT%)  %           56.0-77.0              

    

 

             LYMPHOCYTE % (test code = LY%)  %           14.0-32.0              

    

 

             NEUTROPHIL # (test code = NT#)  x10 3/uL    2.0-7.6                

    

 

             LYMPHOCYTE # (test code = LY#)  x10 3/uL    1.0-3.8                

    

 

             MANUAL DIFF REQUIRED (test code = MDIFF)                           

              





TROPONIN--10-26 22:12:00* 



             Test Item    Value        Reference Range Interpretation Comments

 

             TROPONIN-I (test code = TROPI) < 0.015 ng/mL 0.000-0.045  N        

    Negative:            

<= 0.045 Positive:             >= 0.046 Correlation with serial results, other 
cardiac markers andclinical findings is necessary to determine the 
clinicalsignificance of this result. Results using different methodologies 
should not be comparedto one another as quantitative results may vary by method.





RESPIRATORY VIRUS PANEL PCR2019-10-26 21:30:00* 



             Test Item    Value        Reference Range Interpretation Comments

 

             RSV A PCR (test code = RSV A) Negative     Negative                

   

 

             RSV B PCR (test code = RSV B) Negative     Negative                

   

 

             INFLUENZA A (test code = FLUAPCR) Negative     Negative            

       

 

             INFLUENZA A SUBTYPE H1 (test code = FLUAH1) Negative     Negative  

                 

 

             INFLUENZA A SUBTYPE H3 (test code = FLUAH3) Negative     Negative  

                 

 

             INFLUENZA B (test code = FLUBPCR) Negative     Negative            

       

 

             PARAINFLUENZA TYPE 1 PCR (test code = PIF1) Negative     Negative  

                 

 

             PARAINFLUENZA TYPE 2 PCR (test code = PIF2) Negative     Negative  

                 

 

             PARAINFLUENZA TYPE 3 PCR (test code = PIF3) Negative     Negative  

                 

 

             PARAINFLUENZA TYPE 4 PCR (test code = PIF4) Negative     Negative  

                 

 

             RHINOVIRUS PCR (test code = RHINO) Negative     Negative           

        

 

             METAPNEUMOVIRUS PCR (test code = METAPNEU) Negative     Negative   

                

 

             ADENOVIRUS PCR (test code = ADENOPCR) Negative     Negative        

           

 

             BORDETELLA PERTUSSIS DNA PCR (test code = BORDPERDNA) Negative     

Negative                   

 

             B PARAPERTUSSIS BY PCR (test code = BPARAPCR) Negative     Negative

                   

 

             BORDETELLA HOLMESII (test code = BORDHOLM) Negative     Negative   

               Testing was 

performed using nucleic acid amplificationincluding Bordetella 
parapertussis/brochiseptica, Bordetella holmesii, and Bordetella pertussis.

 

             COMPLEMENT BETA C1 (test code = COMBC1) RVP Comment                

            Testing was performed 

using nucleic acid amplificationincluding influenza A, influenza A H1, influenza
A H3,influenza B, RSV-A, RSV-B, Adenovirus, HumanMetapneumovirus, Parainfluenza 
1,2,3 and 4, Rhinovirus, Bordetella parapertussis/brochiseptica, Bordetella 
holmesii, and Bordetella pertussis.





PROCALCITONIN (PCT)2019-10-26 19:41:00* 



             Test Item    Value        Reference Range Interpretation Comments

 

             PROCALCITONIN (PCT) (test code = PROCAL) 4.04 ng/mL   0.00-0.05    

H             PROCALCITONIN 

(PCT) NORMAL RANGE (ADULT): <0.05 NG/ML. * a concentration <0.5 ng/mL represents
a low risk of  severe sepsis and/or septic shock.* a concentration >2 ng/mL 
represents a high risk of severe  sepsis and/or septic shock.Nevertheless, 
concentrations <0.5 ng/mL do not exclude aninfection, on account of localized 
infections (withoutsystemic signs) which can be associated with such 
lowconcentrations, or a systemic infection in its initialstages (< 6 hours). 
Furthermore, increased procalcitonincan occur without infection. PCT 
concentrations between 0.5and 2.0 ng/mL should be interpreted taking into 
account thepatient's history. It is recommended to retest PCT within6-24 hours 
if any concentrations <2 ng/mL are obtained.





TROPONIN--10-26 18:44:00* 



             Test Item    Value        Reference Range Interpretation Comments

 

             TROPONIN-I (test code = TROPI) < 0.015 ng/mL 0.000-0.045  N        

    Negative:            

<= 0.045 Positive:             >= 0.046 Correlation with serial results, other 
cardiac markers andclinical findings is necessary to determine the 
clinicalsignificance of this result. Results using different methodologies 
should not be comparedto one another as quantitative results may vary by method.





TROPONIN--10-26 17:05:00* 



             Test Item    Value        Reference Range Interpretation Comments

 

             TROPONIN-I (test code = TROPI) < 0.015 ng/mL 0.000-0.045  N        

    Negative:            

<= 0.045 Positive:             >= 0.046 Correlation with serial results, other 
cardiac markers andclinical findings is necessary to determine the 
clinicalsignificance of this result. Results using different methodologies 
should not be comparedto one another as quantitative results may vary by method.





TROPONIN--10-26 14:11:00* 



             Test Item    Value        Reference Range Interpretation Comments

 

             TROPONIN-I (test code = TROPI) < 0.015 ng/mL 0.000-0.045  N        

    Negative:            

<= 0.045 Positive:             >= 0.046 Correlation with serial results, other 
cardiac markers andclinical findings is necessary to determine the 
clinicalsignificance of this result. Results using different methodologies 
should not be comparedto one another as quantitative results may vary by method.





COMMENTS: 3 troponins total (including troponin done in ED)UA RFLX MICR CULT IF 
INDICATED2019-10-26 11:10:00* 



             Test Item    Value        Reference Range Interpretation Comments

 

             UA COLOR (test code = COLU) YELLOW       YEL/STRAW                 

 

 

             UA APPEARANCE (test code = APPU) CLEAR        CLEAR                

      

 

             UA GLUCOSE DIPSTICK (test code = DGLUU) NEGATIVE     NEGATIVE      

             

 

             UA BILIRUBIN DIPSTICK (test code = BILU) NEGATIVE     NEGATIVE     

              

 

             UA KETONE DIPSTICK (test code = KETU) TRACE        NEGATIVE     A  

           

 

             UA SPECIFIC GRAVITY (test code = SGU) 1.015        1.005-1.030  N  

           

 

             UA BLOOD DIPSTICK (test code = WHITNEY) NEGATIVE     NEGATIVE          

         

 

             UA PH DIPSTICK (test code = ANISHA) 8.0          5.0-7.0      H       

      

 

             UA PROTEIN DIPSTICK (test code = PROU) 3+           NEGATIVE     A 

            

 

             UA UROBILINIOGEN DIPSTICK (test code = URO) 0.2 mg/dL    0.2-1.0   

                 

 

             UA NITRITE DIPSTICK (test code = JAMES) NEGATIVE     NEGATIVE       

            

 

             UA LEUKOCYTE ESTERASE DIPSTICK (test code = LEUU) NEGATIVE     NEGA

TIVE                   

 

             UA WBC (test code = WBCU) 4-9 WBC/HPF  0-3          A             

 

             UA RBC (test code = RBCU) 4-10 RBC/HPF 0-3                        

 

             UA WBC NO REFLEX (test code = WBCUCL) 4-9 WBC/HPF  0-3          A  

           

 

             UA BACTERIA (test code = BACU) TRACE /HPF   NONE SEEN              

    

 

             UA SQUAMOUS CELLS (test code = SQU) 0-5 /HPF     NONE SEEN         

         

 

             UA MUCUS (test code = MUCU) TRACE /LPF   NONE SEEN                 

 





Indication for culture:     Sev. Sepsis-no other srcSpecimen Description: PATRICIO KRISHNAMURTHY CATH (CHEATHAM)Cath Status: Over 72 hoursPROCALCITONIN (PCT)2019-10-26 
11:05:00* 



             Test Item    Value        Reference Range Interpretation Comments

 

             PROCALCITONIN (PCT) (test code = PROCAL) 4.86 ng/mL   0.00-0.05    

H             PROCALCITONIN 

(PCT) NORMAL RANGE (ADULT): <0.05 NG/ML. * a concentration <0.5 ng/mL represents
a low risk of  severe sepsis and/or septic shock.* a concentration >2 ng/mL 
represents a high risk of severe  sepsis and/or septic shock.Nevertheless, 
concentrations <0.5 ng/mL do not exclude aninfection, on account of localized 
infections (withoutsystemic signs) which can be associated with such 
lowconcentrations, or a systemic infection in its initialstages (< 6 hours). 
Furthermore, increased procalcitonincan occur without infection. PCT 
concentrations between 0.5and 2.0 ng/mL should be interpreted taking into 
account thepatient's history. It is recommended to retest PCT within6-24 hours 
if any concentrations <2 ng/mL are obtained.





ARTERIAL BLOOD GAS2019-10-26 10:54:00* 



             Test Item    Value        Reference Range Interpretation Comments

 

             ARTERIAL BLOOD GAS PH (test code = PHA) 7.495        7.35-7.45    H

             

 

             ARTERIAL BLOOD GAS PCO2 (test code = PCO2A) 36.5 mmHg    35-45     

   N             

 

             ARTERIAL BLOOD GAS PO2 (test code = PO2A) 116 mmHg           

 H             

 

             BICARBONATE TOTAL HCO3 (test code = HCO3) 28.2 mmol/L  22.0-26.0   

 H             

 

             BASE EXCESS (test code = URIEL) 5.0 mmol/L   -4-4         H          

   

 

             ABG O2 SATURATION (test code = SATA) 99 %                N   

          

 

             ABG DELIVERY (test code = MATTY) Cannula                            

     

 

             ABG TEMPERATURE (test code = TEMPA) 98.0 F                         

         

 

             ABG SITE (test code = SITEA) R Rad                                 

  

 

             TCO2 ARTERIAL (test code = TCO2A) 29                               

       





- XR CHEST 1 -10-26 10:16:00 FAX: Aleksandr Cruz MD   [phone]
   [place]:   St: REG----------
---------------------------------------------------------------------  Name:   TY DOUGLAS                    [facility]                    : [birth year]  Age/S: 69/M           [address] [place] [address] Blvd         Unit #: [id number]    
 Loc: [address]       [place] [address]                 Phys: Stefan Gayle MD   
                                              Acct: [id number] Dis Date:      
        Status: REG ER                                 PHONE #: [phone]    
Exam Date:     10/26/2019     0947                   FAX #: [phone]     
Reason: SEPSIS. RESP DIST                                  EXAMS:               
                               CPT CODE:      [id number] XR CHEST 1 V            
                  [id number]                    EXAM:  Single view  AP chest.        
      EXAM DATE:  10/26/2019 at 0939 hours               CLINICAL HISTORY:  
SEPSIS. RESP DIST               COMPARISON:  2019 at 0843 hours     
                 Right-sided central catheter is stable compared to the prior e
xam.       Exam is at low lung volume.       Cardiac size is grossly within norm
al limits.       The right lung appears stable compared to the prior exam.      
Increased interstitial markings and infiltrates in the left lung are       again
identified and not significantly changed when considering       differences in 
inspiration.       Visualized osseous structures are unremarkable.              
  IMPRESSION: Stable appearance to the chest with infiltrates/increased         
interstitial markings most prominent in the left lung.                  ** Deja
ctronically Signed by CRAIG Calderón **          **              on 10/2
2019 at 1016              **                      Reported and signed by: Twin Calderón M.D.                   CC: Aleksandr Sanchez MD                     
                                                                                
                Technologist: Sherrell Argueta, RT(R); Josselin Ramsey RT(R) 
    [facility] Date/Time/By: 10/26/2019 (1016) : By: [email or web address]           Orig Print
D/T: S: 10/26/2019 (1019)                         PAGE  1                       
Signed Report                               B-TYPE NATRIURETIC [id number]
10:07:00* 



             Test Item    Value        Reference Range Interpretation Comments

 

             B-TYPE NATRIURETIC PEPTIDE (test code = BNP) 223.3 PG/ML  0-100    

    H             





HEPATIC FUNCTION PANEL2019-10-26 09:50:00* 



             Test Item    Value        Reference Range Interpretation Comments

 

             TOTAL PROTEIN (test code = PROT) 5.3 g/dL     6.4-8.2      L       

      

 

             ALBUMIN (test code = ALB) 1.90 g/dL    3.4-5.0      L             

 

             BILIRUBIN TOTAL (test code = BILT) 0.5 MG/DL    <1.5         N     

        

 

             BILIRUBIN DIRECT (test code = BILD) 0.20 MG/DL   0.0-0.30          

         

 

             BILIRUBIN INDIRECT (test code = BILIND) 0.30 MG/DL                 

             

 

             SGOT/AST (test code = AST) 26 IUnit/L   15-37        N             

 

             SGPT/ALT (test code = ALT) 36 IUnit/L   15-65        N             

 

             ALKALINE PHOSPHATASE TOTAL (test code = ALKP) 101 IUnit/L    

     N             





TROPONIN-I RAPID2019-10-26 09:31:00* 



             Test Item    Value        Reference Range Interpretation Comments

 

             TROPONIN-I RAPID (test code = TROPIRAP) 0.01 ng/mL   0.00-0.08    N

            Performed by 

certified  at  [facility]          Negative:          <= 0.08   
      Positive:          >= 0.09An elevated troponin value alone is not 
sufficient todiagnose a myocardial infarction. Rather, the patient sclinical 
presentation (history, physical exam) and ECGshould be used in conjunction with 
troponin in thediagnostic evaluation of suspected myocardial infarction. Aserial
sampling protocol is recommended to facilitate the identification of temporal 
changes in troponin levels characteristic of MI.





CBC W/AUTO DIFF2019-10-26 09:28:00* 



             Test Item    Value        Reference Range Interpretation Comments

 

             WHITE BLOOD CELL (test code = WBC) 11.42 x10 3/uL 4.5-11.0     H   

          

 

             RED BLOOD CELL (test code = RBC) 2.71 x10 6/uL 4.00-5.60    L      

       

 

             HEMOGLOBIN (test code = HGB) 7.9 g/dL     12.5-16.9    L           

  

 

             HEMATOCRIT (test code = HCT) 24.9 %       37.5-50.7    L           

  

 

             MEAN CELL VOLUME (test code = MCV) 91.9 fL      81.0-99.0    N     

        

 

             MEAN CELL HGB (test code = MCH) 29.2 pg      27.0-33.0    N        

     

 

             MEAN CELL HGB CONCETRATION (test code = MCHC) 31.7 g/dL    33.0-37.

0    L             

 

             RED CELL DISTRIBUTION WIDTH CV (test code = RDW) 17.2 %       11.5-

14.5    H             

 

             RED CELL DISTRIBUTION WIDTH SD (test code = RDW-SD) 58.4 fL      37

.0-54.0    H             

 

             PLATELET COUNT (test code = PLT) 230 x10 3/uL 150-400      N       

      

 

             MEAN PLATELET VOLUME (test code = MPV) 10.4 fL      7.0-9.0      H 

            

 

             NEUTROPHIL % (test code = NT%) 82.3 %       56.0-77.0    H         

    

 

             IMMATURE GRANULOCYTE % (test code = IG%) 1.7 %        0.0-2.0      

N             

 

             LYMPHOCYTE % (test code = LY%) 8.3 %        14.0-32.0    L         

    

 

             MONOCYTE % (test code = MO%) 7.5 %        4.8-9.0      N           

  

 

             EOSINOPHIL % (test code = EO%) 0.0 %        0.3-3.7      L         

    

 

             BASOPHIL % (test code = BA%) 0.2 %        0.0-2.0      N           

  

 

             NUCLEATED RBC % (test code = NRBC%) 0.0 %        0-0          N    

         

 

             NEUTROPHIL # (test code = NT#) 9.40 x10 3/uL 2.0-7.6      H        

     

 

             IMMATURE GRANULOCYTE # (test code = IG#) 0.19 x10 3/uL 0.00-0.03   

 H             

 

             LYMPHOCYTE # (test code = LY#) 0.95 x10 3/uL 1.0-3.8      L        

     

 

             MONOCYTE # (test code = MO#) 0.86 x10 3/uL 0.1-0.8      H          

   

 

             EOSINOPHIL # (test code = EO#) 0.00 x10 3/uL 0.0-0.2      N        

     

 

             BASOPHIL # (test code = BA#) 0.02 x10 3/uL 0.0-0.2      N          

   

 

             NUCLEATED RBC # (test code = NRBC#) 0.00 x10 3/uL 0.0-0.1      N   

          

 

             MANUAL DIFF REQUIRED (test code = MDIFF) NO                        

              





CHEMISTRY 8 PROFILE2019-10-26 09:25:00* 



             Test Item    Value        Reference Range Interpretation Comments

 

             ISTAT-SODIUM (test code = NAP)  MMOL/L      134-147                

    

 

             ISTAT-POTASSIUM (test code = KP)  MMOL/L      3.4-5.0              

      

 

             ISTAT-CHLORIDE (test code = CLP)  MMOL/L      100-108              

      

 

             ISTAT CARBON DIOXIDE (test code = ISTAT-CO2)  mmol/L      21-33    

    N             

 

             ISTAT CALCIUM IONIZED (test code = ISTAT-TASHIA)  MG/DL       1.12-1.3

2                  

 

             ISTAT-GLUCOSE (test code = GLUP)  MG/DL              N       

      

 

             ISTAT-BUN (test code = BUNP)  MG/DL       7-18         N           

  

 

             BEDSIDE CREATININE (test code = CREATBED)  MG/DL       0.6-1.3     

 H             

 

             GLOMERULAR FILTRATION RATE POC (test code = GFRBED) 18 ML/MIN      

                         





CHEMISTRY 8 PROFILE2019-10-26 09:25:00* 



             Test Item    Value        Reference Range Interpretation Comments

 

             ISTAT-SODIUM (test code = NAP) 134 MMOL/L   134-147      N         

    

 

             ISTAT-POTASSIUM (test code = KP) 4.1 MMOL/L   3.4-5.0      N       

      

 

             ISTAT-CHLORIDE (test code = CLP) 95 MMOL/L    100-108      L       

     Performed by certified 

 at  [facility]

 

             ISTAT CARBON DIOXIDE (test code = ISTAT-CO2) 27.0 mmol/L  21-33    

    N             

 

             ISTAT CALCIUM IONIZED (test code = ISTAT-TASHIA) 1.01 MG/DL   1.12-1.3

2    L             

 

             ISTAT-GLUCOSE (test code = GLUP) 78 MG/DL            N       

      

 

             ISTAT-BUN (test code = BUNP) 17 MG/DL     7-18         N           

  

 

             BEDSIDE CREATININE (test code = CREATBED) 3.5 MG/DL    0.6-1.3     

 H             

 

             GLOMERULAR FILTRATION RATE POC (test code = GFRBED) 18 ML/MIN      

                         





LACTIC ACID POC2019-10-26 09:18:00* 



             Test Item    Value        Reference Range Interpretation Comments

 

             LACTIC ACID POC (test code = LACTP) 1.4 MMOL/L   0.90-1.70    N    

        Performed by 

certified  at  [facility]





- CT HEAD/BRAIN W/O CONT2019-10-24 17:59:00  Name: TY PORTER                
    [facility]                    : 1950 Age/S: 69  / M         
[address] [place] [address]         Unit #: [id number]     Loc:               
[place] [address]                 Phys: Aleksandr Sanchez MD                      
                           Acct: [id number]  Dis Date:               Status: 
REG CLI                                 PHONE #: [phone]     Exam Date: 
10/24/2019  1746                     FAX #: [phone]      Reason: CVA       
                                         EXAMS:                                 
             CPT CODE:      [id number] CT HEAD/BRAIN W/O CONT                    
[address]                    STUDY:  - CT HEAD/BRAIN W/O CONT 10/24/2019 5:34 PM    
  Ordering Physician: Aleksandr Sanchez MD               Patient Name: TY PORTER MR: [id number]       : 1950; Age: 69 years  y/o Male              
Clinical Indication: Altered mental status, confusion CVA               
Comparison: MRI 2019               TECHNIQUE: Multiple contiguous 
transaxial noncontrast CT images were       obtained through the head. Coronal 
and sagittal reformatted images       were prepared.               DOSE:  CT 
imaging performed at this location utilizes radiation dose       optimization 
technique which includes one or more of the followin)       Automated 
exposure control; 2) Adjustment of the mA and/or kV       according to patient's
size; 3) Use of iterative reconstruction       techniques.  DLP (mGy-cm): 1335.3
                      FINDINGS:               Mild to moderate diffuse atrophy 
is present associated with mild       nonspecific periventricular low att
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  Prior
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
are not well evaluated on       current examination.  No new territorial infar
ct.       No evidence of acute intracranial hemorrhage, mass lesion, mass       
effect, midline shift, or extra-axial fluid collection. The lateral       ventri
cles, third ventricle, fourth ventricle, and basilar cisterns       are appropri
ate for degree of atrophy present.                The visualized portions of the
paranasal sinuses are clear.                Mastoids are clear.                 
                IMPRESSION:                    No acute intracranial abnormali
ty. Mild to moderate diffuse atrophy is         present associated with mild non
specific periventricular low         attenuation most consistent with old microa
ngiopathic ischemic change.          Prior noted multiple small infarcts in the 
right superior frontal     PAGE  1                       Signed Report          
         (CONTINUED)   Name: TY PORTER                     [place] [facility]  
                 : 1950 Age/S: 69  / M         [address] [place] [address] 
       Unit #: [id number]     Loc:               [place] [address]              
  Phys: Aleksandr Sanchez MD                                                  
Acct: [id number]  Dis Date:               Status: REG CLI                     
           PHONE #: [phone]     Exam Date: 10/24/2019  1746                
    FAX #: [phone]      Reason: CVA                                        
        EXAMS:                                               CPT CODE:      0
[id number] CT HEAD/BRAIN W/O CONT                     [address]               <
Continued>          parietal lobes are not well evaluated on current 
examination.  No new         territorial infarct.                   If there is 
further concern for intracranial pathology or acute         stroke, further 
assessment with an MRI of the brain should be         considered.               
             SL:  [id number]                    ** Electronically Signed by 
HARRY Hoyos **            **             on 10/24/2019 at 1759          
 **                      Reported and signed by: Eric Hoyos D.O.           
                CC: Aleksandr Sanchez MD                                           
                                                                           
Technologist:RT Saurabh(R)(CT)        CTDI:        DLP:        Trnscb 
Date/Time: 10/24/2019 () [id number]                       Orig Print D/T: S:
10/24/2019 (2478)      PAGE  2                       Signed Report              
                GLUBED2019-10-13 16:50:00* 



             Test Item    Value        Reference Range Interpretation Comments

 

             GLUBED (test code = GLUBED) 118 MG/DL           H            

Performed by certified  

at  [facility]





GLUBED2019-10-13 15:35:00* 



             Test Item    Value        Reference Range Interpretation Comments

 

             GLUBED (test code = GLUBED) 99 MG/DL            N            

Performed by certified  at

 [facility]





GLUBED2019-10-13 08:41:00* 



             Test Item    Value        Reference Range Interpretation Comments

 

             GLUBED (test code = GLUBED) 90 MG/DL            N            

Performed by certified  at

 [facility]





GLUBED2019-10-12 20:47:00* 



             Test Item    Value        Reference Range Interpretation Comments

 

             GLUBED (test code = GLUBED) 94 MG/DL            N            

Performed by certified  at

 [facility]





GLUBED2019-10-12 17:34:00* 



             Test Item    Value        Reference Range Interpretation Comments

 

             GLUBED (test code = GLUBED) 101 MG/DL           N            

Performed by certified  

at  [facility]





CBC W/AUTO DIFF2019-10-12 13:39:00* 



             Test Item    Value        Reference Range Interpretation Comments

 

             WHITE BLOOD CELL (test code = WBC) 7.34 x10 3/uL 4.5-11.0     N    

         

 

             RED BLOOD CELL (test code = RBC) 2.62 x10 6/uL 4.00-5.60    L      

       

 

             HEMOGLOBIN (test code = HGB) 7.8 g/dL     12.5-16.9    L           

  

 

             HEMATOCRIT (test code = HCT) 23.8 %       37.5-50.7    L           

  

 

             MEAN CELL VOLUME (test code = MCV) 90.8 fL      81.0-99.0    N     

        

 

             MEAN CELL HGB (test code = MCH) 29.8 pg      27.0-33.0    N        

     

 

             MEAN CELL HGB CONCETRATION (test code = MCHC) 32.8 g/dL    33.0-37.

0    L             

 

             RED CELL DISTRIBUTION WIDTH CV (test code = RDW) 16.0 %       11.5-

14.5    H             

 

             RED CELL DISTRIBUTION WIDTH SD (test code = RDW-SD) 53.1 fL      37

.0-54.0    N             

 

             PLATELET COUNT (test code = PLT) 254 x10 3/uL 150-400      N       

      

 

             MEAN PLATELET VOLUME (test code = MPV) 9.4 fL       7.0-9.0      H 

            

 

             NEUTROPHIL % (test code = NT%) 70.0 %       56.0-77.0    N         

    

 

             IMMATURE GRANULOCYTE % (test code = IG%) 2.0 %        0.0-2.0      

N             

 

             LYMPHOCYTE % (test code = LY%) 18.5 %       14.0-32.0    N         

    

 

             MONOCYTE % (test code = MO%) 9.0 %        4.8-9.0      N           

  

 

             EOSINOPHIL % (test code = EO%) 0.0 %        0.3-3.7      L         

    

 

             BASOPHIL % (test code = BA%) 0.5 %        0.0-2.0      N           

  

 

             NUCLEATED RBC % (test code = NRBC%) 0.0 %        0-0          N    

         

 

             NEUTROPHIL # (test code = NT#) 5.13 x10 3/uL 2.0-7.6      N        

     

 

             IMMATURE GRANULOCYTE # (test code = IG#) 0.15 x10 3/uL 0.00-0.03   

 H             

 

             LYMPHOCYTE # (test code = LY#) 1.36 x10 3/uL 1.0-3.8      N        

     

 

             MONOCYTE # (test code = MO#) 0.66 x10 3/uL 0.1-0.8      N          

   

 

             EOSINOPHIL # (test code = EO#) 0.00 x10 3/uL 0.0-0.2      N        

     

 

             BASOPHIL # (test code = BA#) 0.04 x10 3/uL 0.0-0.2      N          

   

 

             NUCLEATED RBC # (test code = NRBC#) 0.00 x10 3/uL 0.0-0.1      N   

          

 

             MANUAL DIFF REQUIRED (test code = MDIFF) NO                        

              





GLUBED2019-10-12 11:53:00* 



             Test Item    Value        Reference Range Interpretation Comments

 

             GLUBED (test code = GLUBED) 79 MG/DL            N            

Performed by certified  at

 [facility]





- XR CHEST 1 -10-12 09:36:00 FAX: America Wilson [phone]
   [place]:   St: ADM FAX: Jonny Lerner MD   [phone]   
------------------------------------------------------------------------------- 
Name:   TY PORTER                    [facility]                    :
1950  Age/S: 69/M           [address] [place] [address]         Unit #: 
[id number]      Loc: [address]       [place] [address]                 Phys: America Mchugh MD                                               Acct: 
[id number] Dis Date:               Status: ADM IN                             
   PHONE #: [phone]     Exam Date:     10/12/2019     09                 
 FAX #: [phone]     Reason: pneumonia                                      
   EXAMS:                                               CPT CODE:      [id number]
XR CHEST 1 V                               [address]                    EXAM:  
Single view  AP chest.               EXAM DATE:  10/12/2019 at 0843 hours       
       CLINICAL HISTORY:  pneumonia               COMPARISON:  2019 
at 1016 hours                       Endotracheal tube and enteric tube have been
removed.  Right-sided       central catheter tip is stable.               
Cardiac size is unchanged compared to the prior exam.       Increased 
interstitial markings in the right lung are not       significantly changed.    
  Increased interstitial markings/infiltrates in the left lung are again       
identified and appears slightly worse when compared to the prior exam.        
This may be in part secondary to patient positioning and level of       in
spiration.       Visualized osseous structures demonstrate no acute findings.   
             IMPRESSION:          Stable interstitial/infiltrate right lung.    
    Increasing infiltrate left upper lung.                  ** Electronically 
Signed by CRAIG Calderón **          **              on 10/12/2019 at 09
36              **                      Reported and signed by: Kirsten chapman M.D.              CC: America Payne MD; Jonny Love MD                
                                                                                
Technologist: Dona Mendoza, PUJAT RT(R); [place], RT(R)        Trnscdoris Da
te/Time/By: 10/12/2019 (0936) : By: [email or web address]           Orig Print D/T: S: 10/12
/2019 (1246)                         PAGE  1                       Signed Report
                              GLUBED2019-10-12 09:29:00* 



             Test Item    Value        Reference Range Interpretation Comments

 

             GLUBED (test code = GLUBED) 74 MG/DL            N            

Performed by certified  at

 [facility]





RENAL FUNCTION PANEL2019-10-12 08:26:00* 



             Test Item    Value        Reference Range Interpretation Comments

 

             SODIUM (test code = NA) 139 mEq/L    134-147      N             

 

             POTASSIUM (test code = K) 3.7 mEq/L    3.4-5.0      N             

 

             CHLORIDE (test code = CL) 105 mEq/L    100-108      N             

 

             CARBON DIOXIDE (test code = CO2) 26 mEq/L     21-33        N       

      

 

             ANION GAP (test code = GAP) 12           0-20         N            

 

 

             GLUCOSE (test code = GLU) 77 mg/dL            N             

 

             BLOOD UREA NITROGEN (test code = BUN) 18 mg/dL     7-18         N  

           

 

             GLOMERULAR FILTRATION RATE (test code = GFR) 16.3         70-80    

    L            Units of measure = 

ml/min/1.73 m2

 

             CREATININE (test code = CREAT) 3.7 mg/dL    0.6-1.3      H         

    

 

             ALBUMIN (test code = ALB) 1.90 g/dL    3.4-5.0      L             

 

             CALCIUM (test code = CA) 7.6 mg/dL    8.0-10.5     L             

 

             PHOSPHOROUS (test code = PHOS) 3.5 MG/DL    2.5-4.9      N         

    





GLUBED2019-10-11 21:40:00* 



             Test Item    Value        Reference Range Interpretation Comments

 

             GLUBED (test code = GLUBED) 127 MG/DL           H            

Performed by certified  

at  [facility]





GLUBED2019-10-11 19:02:00* 



             Test Item    Value        Reference Range Interpretation Comments

 

             GLUBED (test code = GLUBED) 80 MG/DL            N            

Performed by certified  at

 [facility]





GLUBED2019-10-11 12:14:00* 



             Test Item    Value        Reference Range Interpretation Comments

 

             GLUBED (test code = GLUBED) 92 MG/DL            N            

Performed by certified  at

 [facility]





GLUBED2019-10-11 08:59:00* 



             Test Item    Value        Reference Range Interpretation Comments

 

             GLUBED (test code = GLUBED) 81 MG/DL            N            

Performed by certified  at

 [facility]





GLUBED2019-10-10 16:58:00* 



             Test Item    Value        Reference Range Interpretation Comments

 

             GLUBED (test code = GLUBED) 82 MG/DL            N            

Performed by certified  at

 [facility]





GLUBED2019-10-10 16:39:00* 



             Test Item    Value        Reference Range Interpretation Comments

 

             GLUBED (test code = GLUBED) 94 MG/DL            N            

Performed by certified  at

 [facility]





VANCOMYCIN2019-10-10 13:00:00* 



             Test Item    Value        Reference Range Interpretation Comments

 

             VANCOMYCIN (test code = VANCO) 17.8 mcg/mL                         

    





COMMENTS: PLEASE DRAW LEVEL BEFORE DIALYSIS IS GIVENGLUBED2019-10-10 12:14:00* 



             Test Item    Value        Reference Range Interpretation Comments

 

             GLUBED (test code = GLUBED) 63 MG/DL            L            

Performed by certified  at

 [facility]





GLUBED2019-10-10 07:54:00* 



             Test Item    Value        Reference Range Interpretation Comments

 

             GLUBED (test code = GLUBED) 50 MG/DL            L            

Performed by certified  at

 [facility]





ACT-ISTAT2019-10-09 16:02:00* 



             Test Item    Value        Reference Range Interpretation Comments

 

             ACT-ISTAT (test code = ACTI) 279 SEC             H           

 Performed by certified  at

 [facility]





- DUP VEIN BIL2019-10-09 12:52:00  Name: TY PORTER                     [facility]                    : 1950 Age/S: 69  / M         [address] [place] [address]         Unit #: [id number]     Loc:               [facility] [place] [address] 
               Phys: Jones Moulton  NP                                          
        Acct: [id number]  Dis Date:               Status: ADM IN              
                   PHONE #: [phone]     Exam Date: 10/09/2019  1244        
            FAX #: [phone]      Reason: Upper arm swelling                 
                EXAMS:                                               CPT CODE:  
   [id number] DUP VEIN KALEN                               [address]                   
EXAM: US BILATERAL UPPER EXTREMITY VENOUS DOPPLER               DATE: 10/9/2019 
9:23 AM : 1950; Age: 69 years  y/o Male               INDICATION: 
Bilateral arm edema Upper arm swelling               ADDITIONAL INFORMATION: 
None.               COMPARISON: None.                 TECHNIQUE: Multiplanar 
grayscale, color Doppler and spectral Doppler       ultrasound of the bilateral 
upper extremity veins.               FINDINGS:                 Right Upper 
Extremity Veins:       Internal Jugular: Patent.        Subclavian: Limited due 
to bandages.        Axillary: Patent.        Brachial: Patent.        Basilic: 
Patent.        Cephalic: Patent.                Left Upper Extremity Veins:     
 Internal Jugular: Patent.        Subclavian: Patent.        Axillary: Patent.  
     Brachial: Patent.        Basilic: Patent.        Cephalic: Distal thrombus 
is seen.                Other: Right ulnar vein is not well seen.  Left ulnar 
vein and       bilateral radial veins are patent.                 IMPRESSION:   
     No deep venous thrombosis (DVT).          Distal left superficial cephalic 
vein thrombus.                   SL:  [id number]                    ** 
Electronically Signed by HARRY Hoyos **            **             on 
10/09/2019 at 1252            **                      Reported and signed by: 
Eric Hoyos D.O.      PAGE  1                       Signed Report           
        (CONTINUED)   Name: TY PORTER                     [facility]   
                : 1950 Age/S: 69  / M         [address] [place] [address]  
      Unit #: [id number]     Loc:               [place] [address]               
 Phys: Jones Moulton NP                                                   Acct:
[id number]  Dis Date:               Status: ADM IN                            
     PHONE #: [phone]     Exam Date: 10/09/2019  1244                     
FAX #: [phone]      Reason: Upper arm swelling                             
    EXAMS:                                               CPT CODE:      
[id number] DUP VEIN KALEN                               [address]               <
Continued>                                        CC: Jonny Love MD; Jones Moulton NP                                                                      
                              Technologist: Aliyah Ahuja RDMS(OB)(AB)         
                  Trnscb Date/Time: 10/09/2019 (1842) [id number]                
      Orig Print D/T: S: 10/09/2019 (1255)     Probe:                       PAGE
 2                       Signed Report                               CBC W/AUTO 
DIFF2019-10-09 08:13:00* 



             Test Item    Value        Reference Range Interpretation Comments

 

             WHITE BLOOD CELL (test code = WBC) 9.25 x10 3/uL 4.5-11.0     N    

         

 

             RED BLOOD CELL (test code = RBC) 2.70 x10 6/uL 4.00-5.60    L      

       

 

             HEMOGLOBIN (test code = HGB) 7.9 g/dL     12.5-16.9    L           

  

 

             HEMATOCRIT (test code = HCT) 24.8 %       37.5-50.7    L           

  

 

             MEAN CELL VOLUME (test code = MCV) 91.9 fL      81.0-99.0    N     

        

 

             MEAN CELL HGB (test code = MCH) 29.3 pg      27.0-33.0    N        

     

 

             MEAN CELL HGB CONCETRATION (test code = MCHC) 31.9 g/dL    33.0-37.

0    L             

 

             RED CELL DISTRIBUTION WIDTH CV (test code = RDW) 16.2 %       11.5-

14.5    H             

 

             RED CELL DISTRIBUTION WIDTH SD (test code = RDW-SD) 53.4 fL      37

.0-54.0    N             

 

             PLATELET COUNT (test code = PLT) 261 x10 3/uL 150-400      N       

      

 

             MEAN PLATELET VOLUME (test code = MPV) 9.8 fL       7.0-9.0      H 

            

 

             NEUTROPHIL % (test code = NT%) 63.7 %       56.0-77.0    N         

    

 

             IMMATURE GRANULOCYTE % (test code = IG%) 4.2 %        0.0-2.0      

H             

 

             LYMPHOCYTE % (test code = LY%) 18.4 %       14.0-32.0    N         

    

 

             MONOCYTE % (test code = MO%) 13.2 %       4.8-9.0      H           

  

 

             EOSINOPHIL % (test code = EO%) 0.0 %        0.3-3.7      L         

    

 

             BASOPHIL % (test code = BA%) 0.5 %        0.0-2.0      N           

  

 

             NUCLEATED RBC % (test code = NRBC%) 0.0 %        0-0          N    

         

 

             NEUTROPHIL # (test code = NT#) 5.89 x10 3/uL 2.0-7.6      N        

     

 

             IMMATURE GRANULOCYTE # (test code = IG#) 0.39 x10 3/uL 0.00-0.03   

 H             

 

             LYMPHOCYTE # (test code = LY#) 1.70 x10 3/uL 1.0-3.8      N        

     

 

             MONOCYTE # (test code = MO#) 1.22 x10 3/uL 0.1-0.8      H          

   

 

             EOSINOPHIL # (test code = EO#) 0.00 x10 3/uL 0.0-0.2      N        

     

 

             BASOPHIL # (test code = BA#) 0.05 x10 3/uL 0.0-0.2      N          

   

 

             NUCLEATED RBC # (test code = NRBC#) 0.00 x10 3/uL 0.0-0.1      N   

          

 

             MANUAL DIFF REQUIRED (test code = MDIFF) NO                        

              





COMMENTS: Daily while on HeparinBASIC METABOLIC PANEL2019-10-09 08:01:00* 



             Test Item    Value        Reference Range Interpretation Comments

 

             SODIUM (test code = NA) 140 mEq/L    134-147      N             

 

             POTASSIUM (test code = K) 3.4 mEq/L    3.4-5.0      N             

 

             CHLORIDE (test code = CL) 107 mEq/L    100-108      N             

 

             CARBON DIOXIDE (test code = CO2) 26 mEq/L     21-33        N       

      

 

             ANION GAP (test code = GAP) 10           0-20         N            

 

 

             GLUCOSE (test code = GLU) 80 mg/dL            N             

 

             BLOOD UREA NITROGEN (test code = BUN) 16 mg/dL     7-18            

           

 

             GLOMERULAR FILTRATION RATE (test code = GFR) 20.8         70-80    

    L            Units of measure = 

ml/min/1.73 m2

 

             CREATININE (test code = CREAT) 3.0 mg/dL    0.6-1.3      H         

    

 

             CALCIUM (test code = CA) 7.6 mg/dL    8.0-10.5     L             





PROTHROMBIN [id number] 06:31:00* 



             Test Item    Value        Reference Range Interpretation Comments

 

             PROTHROMBIN TIME PATIENT (test code = PTP) 11.9 SECONDS 9.3-12.9   

  N             

 

             INTERNATIONAL NORMAL RATIO (test code = INR) 1.1          0.8-1.2  

    N                            

TARGET INR BY INDICATION   Indication                                      INR1.
Prophylaxis of venous thrombosis             2.0 - 3.0   (orthopedic surgery), 
Prophylaxis of   venous thrombosis (other than high-risk   surgery), Treatment 
of Deep Vein   Thrombosis/Pulmonary Embolism, Prevention   of systemic embolism 
- Tissue heart valves,   Acute Myocardial Infarction (to prevent   systemic 
embolism), Valvular heart disease,   Atrial Fibrillation, Bileaflet mechanical  
valve in aortic position.2. Mechanical prosthetic valves (high risk),    2.5 - 
3.5   Presence of Lupus Anticoagulant or   Antiphospholipid Antibodies, 
Prevention of   systemic embolism - Acute Myocardial Infarction   (to prevent 
recurrent infarct).





GLUBED2019-10-09 02:52:00* 



             Test Item    Value        Reference Range Interpretation Comments

 

             GLUBED (test code = GLUBED) 85 MG/DL            N            

Performed by certified  at

 [facility]





CBC W/AUTO DIFF2019-10-08 09:53:00* 



             Test Item    Value        Reference Range Interpretation Comments

 

             WHITE BLOOD CELL (test code = WBC) 10.76 x10 3/uL 4.5-11.0     N   

          

 

             RED BLOOD CELL (test code = RBC) 2.74 x10 6/uL 4.00-5.60    L      

       

 

             HEMOGLOBIN (test code = HGB) 7.9 g/dL     12.5-16.9    L           

  

 

             HEMATOCRIT (test code = HCT) 25.1 %       37.5-50.7    L           

  

 

             MEAN CELL VOLUME (test code = MCV) 91.6 fL      81.0-99.0    N     

        

 

             MEAN CELL HGB (test code = MCH) 28.8 pg      27.0-33.0    N        

     

 

             MEAN CELL HGB CONCETRATION (test code = MCHC) 31.5 g/dL    33.0-37.

0    L             

 

             RED CELL DISTRIBUTION WIDTH CV (test code = RDW) 16.3 %       11.5-

14.5    H             

 

             RED CELL DISTRIBUTION WIDTH SD (test code = RDW-SD) 53.9 fL      37

.0-54.0    N             

 

             PLATELET COUNT (test code = PLT) 255 x10 3/uL 150-400      N       

      

 

             MEAN PLATELET VOLUME (test code = MPV) 9.8 fL       7.0-9.0      H 

            

 

             NEUTROPHIL % (test code = NT%) 65.7 %       56.0-77.0    N         

    

 

             IMMATURE GRANULOCYTE % (test code = IG%) 5.9 %        0.0-2.0      

H             

 

             LYMPHOCYTE % (test code = LY%) 16.5 %       14.0-32.0    N         

    

 

             MONOCYTE % (test code = MO%) 11.2 %       4.8-9.0      H           

  

 

             EOSINOPHIL % (test code = EO%) 0.0 %        0.3-3.7      L         

    

 

             BASOPHIL % (test code = BA%) 0.7 %        0.0-2.0      N           

  

 

             NUCLEATED RBC % (test code = NRBC%) 0.0 %        0-0          N    

         

 

             NEUTROPHIL # (test code = NT#) 7.07 x10 3/uL 2.0-7.6      N        

     

 

             IMMATURE GRANULOCYTE # (test code = IG#) 0.63 x10 3/uL 0.00-0.03   

 H             

 

             LYMPHOCYTE # (test code = LY#) 1.78 x10 3/uL 1.0-3.8      N        

     

 

             MONOCYTE # (test code = MO#) 1.21 x10 3/uL 0.1-0.8      H          

   

 

             EOSINOPHIL # (test code = EO#) 0.00 x10 3/uL 0.0-0.2      N        

     

 

             BASOPHIL # (test code = BA#) 0.07 x10 3/uL 0.0-0.2      N          

   

 

             NUCLEATED RBC # (test code = NRBC#) 0.00 x10 3/uL 0.0-0.1      N   

          

 

             MANUAL DIFF REQUIRED (test code = MDIFF) NO                        

             SLIDE REVIEWED, CONSISTENT WITH 

AUTO DIFF.





COMMENTS: Daily while on HeparinRENAL FUNCTION PANEL2019-10-08 08:40:00* 



             Test Item    Value        Reference Range Interpretation Comments

 

             SODIUM (test code = NA) 145 mEq/L    134-147      N             

 

             POTASSIUM (test code = K) 3.4 mEq/L    3.4-5.0      N             

 

             CHLORIDE (test code = CL) 111 mEq/L    100-108      H             

 

             CARBON DIOXIDE (test code = CO2) 22 mEq/L     21-33        N       

      

 

             ANION GAP (test code = GAP) 15           0-20         N            

 

 

             GLUCOSE (test code = GLU) 74 mg/dL            N             

 

             BLOOD UREA NITROGEN (test code = BUN) 26 mg/dL     7-18         H  

           

 

             GLOMERULAR FILTRATION RATE (test code = GFR) 13.4         70-80    

    L            Units of measure = 

ml/min/1.73 m2

 

             CREATININE (test code = CREAT) 4.4 mg/dL    0.6-1.3      H         

    

 

             ALBUMIN (test code = ALB) 1.80 g/dL    3.4-5.0      L             

 

             CALCIUM (test code = CA) 7.7 mg/dL    8.0-10.5     L             

 

             PHOSPHOROUS (test code = PHOS) 2.7 MG/DL    2.5-4.9      N         

    





CBC W/AUTO DIFF2019-10-08 08:19:00* 



             Test Item    Value        Reference Range Interpretation Comments

 

             WHITE BLOOD CELL (test code = WBC) 10.76 x10 3/uL 4.5-11.0     N   

          

 

             RED BLOOD CELL (test code = RBC) 2.74 x10 6/uL 4.00-5.60    L      

       

 

             HEMOGLOBIN (test code = HGB) 7.9 g/dL     12.5-16.9    L           

  

 

             HEMATOCRIT (test code = HCT) 25.1 %       37.5-50.7    L           

  

 

             MEAN CELL VOLUME (test code = MCV) 91.6 fL      81.0-99.0    N     

        

 

             MEAN CELL HGB (test code = MCH) 28.8 pg      27.0-33.0    N        

     

 

             MEAN CELL HGB CONCETRATION (test code = MCHC) 31.5 g/dL    33.0-37.

0    L             

 

             RED CELL DISTRIBUTION WIDTH CV (test code = RDW) 16.3 %       11.5-

14.5    H             

 

             RED CELL DISTRIBUTION WIDTH SD (test code = RDW-SD) 53.9 fL      37

.0-54.0    N             

 

             PLATELET COUNT (test code = PLT) 255 x10 3/uL 150-400      N       

      

 

             MEAN PLATELET VOLUME (test code = MPV) 9.8 fL       7.0-9.0      H 

            

 

             NEUTROPHIL % (test code = NT%)  %           56.0-77.0              

    

 

             LYMPHOCYTE % (test code = LY%)  %           14.0-32.0              

    

 

             NEUTROPHIL # (test code = NT#)  x10 3/uL    2.0-7.6                

    

 

             LYMPHOCYTE # (test code = LY#)  x10 3/uL    1.0-3.8                

    

 

             MANUAL DIFF REQUIRED (test code = MDIFF)                           

              





COMMENTS: Daily while on HeparinGLUBED2019-10-08 00:10:00* 



             Test Item    Value        Reference Range Interpretation Comments

 

             GLUBED (test code = GLUBED) 89 MG/DL            N            

Performed by certified  at

 [facility]





GLUBED2019-10-07 17:52:00* 



             Test Item    Value        Reference Range Interpretation Comments

 

             GLUBED (test code = GLUBED) 95 MG/DL            N            

Performed by certified  at

 [facility]





CBC W/AUTO DIFF2019-10-07 10:24:00* 



             Test Item    Value        Reference Range Interpretation Comments

 

             WHITE BLOOD CELL (test code = WBC) 11.03 x10 3/uL 4.5-11.0     H   

          

 

             RED BLOOD CELL (test code = RBC) 2.73 x10 6/uL 4.00-5.60    L      

       

 

             HEMOGLOBIN (test code = HGB) 8.0 g/dL     12.5-16.9    L           

  

 

             HEMATOCRIT (test code = HCT) 25.5 %       37.5-50.7    L           

  

 

             MEAN CELL VOLUME (test code = MCV) 93.4 fL      81.0-99.0          

        

 

             MEAN CELL HGB (test code = MCH) 29.3 pg      27.0-33.0    N        

     

 

             MEAN CELL HGB CONCETRATION (test code = MCHC) 31.4 g/dL    33.0-37.

0    L             

 

             RED CELL DISTRIBUTION WIDTH CV (test code = RDW) 16.7 %       11.5-

14.5    H             

 

             RED CELL DISTRIBUTION WIDTH SD (test code = RDW-SD) 56.3 fL      37

.0-54.0    H             

 

             PLATELET COUNT (test code = PLT) 238 x10 3/uL 150-400      N       

      

 

             MEAN PLATELET VOLUME (test code = MPV) 9.6 fL       7.0-9.0      H 

            

 

             NEUTROPHIL % (test code = NT%) 64.1 %       56.0-77.0    N         

    

 

             IMMATURE GRANULOCYTE % (test code = IG%) 6.2 %        0.0-2.0      

H             

 

             LYMPHOCYTE % (test code = LY%) 18.6 %       14.0-32.0    N         

    

 

             MONOCYTE % (test code = MO%) 10.4 %       4.8-9.0      H           

  

 

             EOSINOPHIL % (test code = EO%) 0.0 %        0.3-3.7      L         

    

 

             BASOPHIL % (test code = BA%) 0.7 %        0.0-2.0      N           

  

 

             NUCLEATED RBC % (test code = NRBC%) 0.0 %        0-0          N    

         

 

             NEUTROPHIL # (test code = NT#) 7.07 x10 3/uL 2.0-7.6      N        

     

 

             IMMATURE GRANULOCYTE # (test code = IG#) 0.68 x10 3/uL 0.00-0.03   

 H             

 

             LYMPHOCYTE # (test code = LY#) 2.05 x10 3/uL 1.0-3.8      N        

     

 

             MONOCYTE # (test code = MO#) 1.15 x10 3/uL 0.1-0.8      H          

   

 

             EOSINOPHIL # (test code = EO#) 0.00 x10 3/uL 0.0-0.2      N        

     

 

             BASOPHIL # (test code = BA#) 0.08 x10 3/uL 0.0-0.2      N          

   

 

             NUCLEATED RBC # (test code = NRBC#) 0.00 x10 3/uL 0.0-0.1      N   

          

 

             MANUAL DIFF REQUIRED (test code = MDIFF) NO                        

             SLIDE REVIEWED, CONSISTENT WITH 

AUTO DIFF.





COMMENTS: Daily while on HeparinLIPOPROTEIN LDL2019-10-07 08:36:00* 



             Test Item    Value        Reference Range Interpretation Comments

 

             LIPOPROTEIN LDL (test code = LDL) 93 mg/dL     0-100        N      

      <100     [id number]  NEAR

OPTIMAL/ABOVE [id number]  [id number]  HIGH>SJ=204  VERY 
HIGH*Guidelines provided by the National Cholesterol EducationProgram Adult 
Treatment Panel III





BASIC METABOLIC PANEL2019-10-07 08:04:00* 



             Test Item    Value        Reference Range Interpretation Comments

 

             SODIUM (test code = NA) 143 mEq/L    134-147      N             

 

             POTASSIUM (test code = K) 4.0 mEq/L    3.4-5.0                    

 

             CHLORIDE (test code = CL) 112 mEq/L    100-108      H             

 

             CARBON DIOXIDE (test code = CO2) 22 mEq/L     21-33        N       

      

 

             ANION GAP (test code = GAP) 13           0-20         N            

 

 

             GLUCOSE (test code = GLU) 71 mg/dL            N             

 

             BLOOD UREA NITROGEN (test code = BUN) 22 mg/dL     7-18         H  

           

 

             GLOMERULAR FILTRATION RATE (test code = GFR) 15.4         70-80    

    L            Units of measure = 

ml/min/1.73 m2

 

             CREATININE (test code = CREAT) 3.9 mg/dL    0.6-1.3      H         

    

 

             CALCIUM (test code = CA) 7.6 mg/dL    8.0-10.5     L             





[id number] 08:04:00* 



             Test Item    Value        Reference Range Interpretation Comments

 

             PHOSPHOROUS (test code = PHOS) 2.4 MG/DL    2.5-4.9      L         

    





MAGNESIUM2019-10-07 08:04:00* 



             Test Item    Value        Reference Range Interpretation Comments

 

             MAGNESIUM (test code = MAG) 2.00 mg/dL   1.8-2.4      N            

 





TROPONIN--10-07 08:04:00* 



             Test Item    Value        Reference Range Interpretation Comments

 

             TROPONIN-I (test code = TROPI) 0.889 ng/mL  0.000-0.045  HH        

    Negative:            

<= 0.045 Positive:             >= 0.046 Correlation with serial results, other 
cardiac markers andclinical findings is necessary to determine the 
clinicalsignificance of this result. Results using different methodologies 
should not be comparedto one another as quantitative results may vary by method.





CALCIUM IONIZED2019-10-07 08:04:00* 



             Test Item    Value        Reference Range Interpretation Comments

 

             CALCIUM IONIZED (test code = TASHIA) 1.03 MMOL/L  1.12-1.32    L      

       





BASIC METABOLIC PANEL2019-10-07 07:34:00* 



             Test Item    Value        Reference Range Interpretation Comments

 

             SODIUM (test code = NA)  mEq/L       134-147                    

 

             POTASSIUM (test code = K)  mEq/L       3.4-5.0                    

 

             CHLORIDE (test code = CL)  mEq/L       100-108                    

 

             CARBON DIOXIDE (test code = CO2)  mEq/L       21-33                

      

 

             ANION GAP (test code = GAP)              0-20                      

 

 

             GLUCOSE (test code = GLU)  mg/dL                            

 

             BLOOD UREA NITROGEN (test code = BUN)  mg/dL       7-18            

           

 

             GLOMERULAR FILTRATION RATE (test code = GFR)              70-80    

                  

 

             CREATININE (test code = CREAT)  mg/dL       0.6-1.3                

    

 

             CALCIUM (test code = CA)  mg/dL       8.0-10.5                   





[id number] 07:34:00* 



             Test Item    Value        Reference Range Interpretation Comments

 

             PHOSPHOROUS (test code = PHOS)  MG/DL       2.5-4.9                

    





MAGNESIUM2019-10-07 07:34:00* 



             Test Item    Value        Reference Range Interpretation Comments

 

             MAGNESIUM (test code = MAG)  mg/dL       1.8-2.4                   

 





TROPONIN--10-07 07:34:00* 



             Test Item    Value        Reference Range Interpretation Comments

 

             TROPONIN-I (test code = TROPI)  ng/mL       0.000-0.045            

    





CALCIUM IONIZED2019-10-07 07:34:00* 



             Test Item    Value        Reference Range Interpretation Comments

 

             CALCIUM IONIZED (test code = TASHIA) 1.03 MMOL/L  1.12-1.32    L      

       





CBC W/AUTO DIFF2019-10-07 06:35:00* 



             Test Item    Value        Reference Range Interpretation Comments

 

             WHITE BLOOD CELL (test code = WBC) 11.03 x10 3/uL 4.5-11.0     H   

          

 

             RED BLOOD CELL (test code = RBC) 2.73 x10 6/uL 4.00-5.60    L      

       

 

             HEMOGLOBIN (test code = HGB) 8.0 g/dL     12.5-16.9    L           

  

 

             HEMATOCRIT (test code = HCT) 25.5 %       37.5-50.7    L           

  

 

             MEAN CELL VOLUME (test code = MCV) 93.4 fL      81.0-99.0          

        

 

             MEAN CELL HGB (test code = MCH) 29.3 pg      27.0-33.0    N        

     

 

             MEAN CELL HGB CONCETRATION (test code = MCHC) 31.4 g/dL    33.0-37.

0    L             

 

             RED CELL DISTRIBUTION WIDTH CV (test code = RDW) 16.7 %       11.5-

14.5    H             

 

             RED CELL DISTRIBUTION WIDTH SD (test code = RDW-SD) 56.3 fL      37

.0-54.0    H             

 

             PLATELET COUNT (test code = PLT) 238 x10 3/uL 150-400      N       

      

 

             NEUTROPHIL % (test code = NT%)  %           56.0-77.0              

    

 

             LYMPHOCYTE % (test code = LY%)  %           14.0-32.0              

    

 

             NEUTROPHIL # (test code = NT#)  x10 3/uL    2.0-7.6                

    

 

             LYMPHOCYTE # (test code = LY#)  x10 3/uL    1.0-3.8                

    

 

             MANUAL DIFF REQUIRED (test code = MDIFF)                           

              





COMMENTS: Daily while on HeparinGLUBED2019-10-07 06:10:00* 



             Test Item    Value        Reference Range Interpretation Comments

 

             GLUBED (test code = GLUBED) 106 MG/DL           N            

Performed by certified  

at  [facility]





GLUBED2019-10-07 05:55:00* 



             Test Item    Value        Reference Range Interpretation Comments

 

             GLUBED (test code = GLUBED) 74 MG/DL            N            

Performed by certified  at

 [facility]





GLUBED2019-10-07 00:00:00* 



             Test Item    Value        Reference Range Interpretation Comments

 

             GLUBED (test code = GLUBED) 82 MG/DL            N            

Performed by certified  at

 [facility]





GLUBED2019-10-06 16:48:00* 



             Test Item    Value        Reference Range Interpretation Comments

 

             GLUBED (test code = GLUBED) 88 MG/DL            N            

Performed by certified  at

 [facility]





GLUBED2019-10-06 16:48:00* 



             Test Item    Value        Reference Range Interpretation Comments

 

             GLUBED (test code = GLUBED) 66 MG/DL            L            

Performed by certified  at

 [facility]





CBC W/AUTO DIFF2019-10-06 06:22:00* 



             Test Item    Value        Reference Range Interpretation Comments

 

             WHITE BLOOD CELL (test code = WBC) 10.96 x10 3/uL 4.5-11.0     N   

          

 

             RED BLOOD CELL (test code = RBC) 2.65 x10 6/uL 4.00-5.60    L      

       

 

             HEMOGLOBIN (test code = HGB) 7.7 g/dL     12.5-16.9    L           

  

 

             HEMATOCRIT (test code = HCT) 23.9 %       37.5-50.7    L           

  

 

             MEAN CELL VOLUME (test code = MCV) 90.2 fL      81.0-99.0          

        

 

             MEAN CELL HGB (test code = MCH) 29.1 pg      27.0-33.0    N        

     

 

             MEAN CELL HGB CONCETRATION (test code = MCHC) 32.2 g/dL    33.0-37.

0    L             

 

             RED CELL DISTRIBUTION WIDTH CV (test code = RDW) 16.6 %       11.5-

14.5    H             

 

             RED CELL DISTRIBUTION WIDTH SD (test code = RDW-SD) 54.1 fL      37

.0-54.0    H             

 

             PLATELET COUNT (test code = PLT) 217 x10 3/uL 150-400      N       

      

 

             MEAN PLATELET VOLUME (test code = MPV) 9.6 fL       7.0-9.0      H 

            

 

             MANUAL DIFF REQUIRED (test code = MDIFF) YES                       

              





COMMENTS: Daily while on HeparinWBC DIFFERENTIAL2019-10-06 06:22:00* 



             Test Item    Value        Reference Range Interpretation Comments

 

             SEGMENTED NEUTROPHILS (test code = SEG) 72.7 %       37-69        H

             

 

             LYMPHOCYTE (test code = LYMPH) 21.8 %       23-55        L         

    

 

             MONOCYTE (test code = MON) 3.7 %        0-10         N             

 

             MYELOCYTE (test code = MYELO) 1.8 %        0.0-0.0      H          

   

 

             POIKILOCYTOSIS (test code = POIK) SLIGHT                           

       

 

             ANISOCYTOSIS (test code = ANISO) 1+                                

      

 

             MICROCYTOSIS (test code = MICR) 1+                                 

     

 

             SCHISTOCYTES (test code = JM) FEW                                 

    

 

             PLATELET ESTIMATE (test code = PLTEST) Adequate THOUSAND ADEQUATE  

                 

 

             PLATELET MORPHOLOGY (test code = PLTMORPH) LARGE PLATELETS         

                   





COMMENTS: Daily while on HeparinCBC W/AUTO DIFF2019-10-06 06:20:00* 



             Test Item    Value        Reference Range Interpretation Comments

 

             WHITE BLOOD CELL (test code = WBC) 10.96 x10 3/uL 4.5-11.0     N   

          

 

             RED BLOOD CELL (test code = RBC) 2.65 x10 6/uL 4.00-5.60    L      

       

 

             HEMOGLOBIN (test code = HGB) 7.7 g/dL     12.5-16.9    L           

  

 

             HEMATOCRIT (test code = HCT) 23.9 %       37.5-50.7    L           

  

 

             MEAN CELL VOLUME (test code = MCV) 90.2 fL      81.0-99.0          

        

 

             MEAN CELL HGB (test code = MCH) 29.1 pg      27.0-33.0    N        

     

 

             MEAN CELL HGB CONCETRATION (test code = MCHC) 32.2 g/dL    33.0-37.

0    L             

 

             RED CELL DISTRIBUTION WIDTH CV (test code = RDW) 16.6 %       11.5-

14.5    H             

 

             RED CELL DISTRIBUTION WIDTH SD (test code = RDW-SD) 54.1 fL      37

.0-54.0    H             

 

             PLATELET COUNT (test code = PLT) 217 x10 3/uL 150-400      N       

      

 

             MEAN PLATELET VOLUME (test code = MPV) 9.6 fL       7.0-9.0      H 

            

 

             MANUAL DIFF REQUIRED (test code = MDIFF) YES                       

              





COMMENTS: Daily while on HeparinWBC DIFFERENTIAL2019-10-06 06:20:00* 



             Test Item    Value        Reference Range Interpretation Comments

 

             ANISOCYTOSIS (test code = ANISO)                                   

      

 

             PLATELET ESTIMATE (test code = PLTEST)  THOUSAND    ADEQUATE       

            





COMMENTS: Daily while on HeparinCBC W/AUTO DIFF2019-10-06 06:20:00* 



             Test Item    Value        Reference Range Interpretation Comments

 

             WHITE BLOOD CELL (test code = WBC) 10.96 x10 3/uL 4.5-11.0     N   

          

 

             RED BLOOD CELL (test code = RBC) 2.65 x10 6/uL 4.00-5.60    L      

       

 

             HEMOGLOBIN (test code = HGB) 7.7 g/dL     12.5-16.9    L           

  

 

             HEMATOCRIT (test code = HCT) 23.9 %       37.5-50.7    L           

  

 

             MEAN CELL VOLUME (test code = MCV) 90.2 fL      81.0-99.0          

        

 

             MEAN CELL HGB (test code = MCH) 29.1 pg      27.0-33.0    N        

     

 

             MEAN CELL HGB CONCETRATION (test code = MCHC) 32.2 g/dL    33.0-37.

0    L             

 

             RED CELL DISTRIBUTION WIDTH CV (test code = RDW) 16.6 %       11.5-

14.5    H             

 

             RED CELL DISTRIBUTION WIDTH SD (test code = RDW-SD) 54.1 fL      37

.0-54.0    H             

 

             PLATELET COUNT (test code = PLT) 217 x10 3/uL 150-400      N       

      

 

             MEAN PLATELET VOLUME (test code = MPV) 9.6 fL       7.0-9.0      H 

            

 

             MANUAL DIFF REQUIRED (test code = MDIFF) YES                       

              





COMMENTS: Daily while on HeparinWBC DIFFERENTIAL2019-10-06 06:20:00* 



             Test Item    Value        Reference Range Interpretation Comments

 

             ANISOCYTOSIS (test code = ANISO)                                   

      

 

             PLATELET ESTIMATE (test code = PLTEST)  THOUSAND    ADEQUATE       

            





COMMENTS: Daily while on HeparinBASIC METABOLIC PANEL2019-10-06 06:00:00* 



             Test Item    Value        Reference Range Interpretation Comments

 

             SODIUM (test code = NA) 145 mEq/L    134-147      N             

 

             POTASSIUM (test code = K) 3.1 mEq/L    3.4-5.0      L             

 

             CHLORIDE (test code = CL) 109 mEq/L    100-108      H             

 

             CARBON DIOXIDE (test code = CO2) 28 mEq/L     21-33        N       

      

 

             ANION GAP (test code = GAP) 11           0-20         N            

 

 

             GLUCOSE (test code = GLU) 81 mg/dL                          

 

             BLOOD UREA NITROGEN (test code = BUN) 15 mg/dL     7-18            

           

 

             GLOMERULAR FILTRATION RATE (test code = GFR) 21.6         70-80    

    L            Units of measure = 

ml/min/1.73 m2

 

             CREATININE (test code = CREAT) 2.9 mg/dL    0.6-1.3      H         

    

 

             CALCIUM (test code = CA) 7.4 mg/dL    8.0-10.5     L             





BASIC METABOLIC PANEL2019-10-06 05:59:00* 



             Test Item    Value        Reference Range Interpretation Comments

 

             SODIUM (test code = NA) 145 mEq/L    134-147      N             

 

             POTASSIUM (test code = K) 3.1 mEq/L    3.4-5.0      L             

 

             CHLORIDE (test code = CL) 109 mEq/L    100-108      H             

 

             CARBON DIOXIDE (test code = CO2) 28 mEq/L     21-33        N       

      

 

             ANION GAP (test code = GAP) 11           0-20         N            

 

 

             GLUCOSE (test code = GLU) 81 mg/dL                          

 

             BLOOD UREA NITROGEN (test code = BUN) 15 mg/dL     7-18            

           

 

             GLOMERULAR FILTRATION RATE (test code = GFR)              70-80    

                  

 

             CREATININE (test code = CREAT)  mg/dL       0.6-1.3                

    

 

             CALCIUM (test code = CA) 7.4 mg/dL    8.0-10.5     L             





CBC W/AUTO DIFF2019-10-06 05:41:00* 



             Test Item    Value        Reference Range Interpretation Comments

 

             WHITE BLOOD CELL (test code = WBC) 10.96 x10 3/uL 4.5-11.0     N   

          

 

             RED BLOOD CELL (test code = RBC) 2.65 x10 6/uL 4.00-5.60    L      

       

 

             HEMOGLOBIN (test code = HGB) 7.7 g/dL     12.5-16.9    L           

  

 

             HEMATOCRIT (test code = HCT) 23.9 %       37.5-50.7    L           

  

 

             MEAN CELL VOLUME (test code = MCV) 90.2 fL      81.0-99.0          

        

 

             MEAN CELL HGB (test code = MCH) 29.1 pg      27.0-33.0    N        

     

 

             MEAN CELL HGB CONCETRATION (test code = MCHC) 32.2 g/dL    33.0-37.

0    L             

 

             RED CELL DISTRIBUTION WIDTH CV (test code = RDW) 16.6 %       11.5-

14.5    H             

 

             RED CELL DISTRIBUTION WIDTH SD (test code = RDW-SD) 54.1 fL      37

.0-54.0    H             

 

             PLATELET COUNT (test code = PLT) 217 x10 3/uL 150-400      N       

      

 

             MEAN PLATELET VOLUME (test code = MPV) 9.6 fL       7.0-9.0      H 

            

 

             NEUTROPHIL % (test code = NT%)  %           56.0-77.0              

    

 

             LYMPHOCYTE % (test code = LY%)  %           14.0-32.0              

    

 

             NEUTROPHIL # (test code = NT#)  x10 3/uL    2.0-7.6                

    

 

             LYMPHOCYTE # (test code = LY#)  x10 3/uL    1.0-3.8                

    

 

             MANUAL DIFF REQUIRED (test code = MDIFF)                           

              





COMMENTS: Daily while on HeparinGLUBED2019-10-06 05:40:00* 



             Test Item    Value        Reference Range Interpretation Comments

 

             GLUBED (test code = GLUBED) 85 MG/DL            N            

Performed by certified  at

 [facility]





GLUBED2019-10-05 23:53:00* 



             Test Item    Value        Reference Range Interpretation Comments

 

             GLUBED (test code = GLUBED) 77 MG/DL            N            

Performed by certified  at

 [facility]





- MRA NECK W/O CONT2019-10-05 19:30:00 FAX: Kimmy Duenas   
[phone]    [place]:   St: ADM FAX: Jonny Lerner MD   
[phone]   ----------------------------------------
---------------------------------------  Name:   TY PORTER                  
 [place]                    : 1950  Age/S: 69/M           [address] [place] [address] Blvd         Unit #: [id number]      Loc: [address]       [facility]
X [address]                 Phys: Kimmy Tomlinson MD                            
                 Acct: [id number] Dis Date:               Status: ADM IN      
                          PHONE #: [phone]     Exam Date:     10/05/2019   
 1843                   FAX #: [phone]     Reason: right sided stroke      
                          EXAMS:                                               
CPT CODE:      [id number] MRA NECK W/O CONT                          70462       
            Clinical Indication: Right-sided stroke.               Comparison: 
None               Technique: Magnetic resonance angiography of the neck was 
performed       without gadolinium contrast with time of flight technique. 3-D m
aximum       intensity projection images were obtained.                FINDINGS:
The origins of the right brachiocephalic artery, right       common carotid art
francisco j, right subclavian artery, right vertebral       artery, left common carotid 
artery, left subclavian artery and left       vertebral artery are widely patent
.               Bilateral common carotid arteries are widely patent.            
  Mild atherosclerotic irregularity along the bilateral carotid       bifurcati
ons, right more than the left without hemodynamically       significant stenosis
by NASCET criteria.                Bilateral external carotid arteries are gtz
nt.                 Bilateral vertebral arteries are widely patent without steno
sis. The       visualized intracranial segments of the vertebral arteries are wi
bailee       patent.               The source images show no evidence of carotid o
r vertebral artery       dissection.               Any reported ICA stenoses dir
ectly reference the distal internal       carotid diameter as the denominator fo
r stenosis measurement.  (NASCET       criteria)                 IMPRESSION:    
              Mild atherosclerotic irregularity along the bilateral carotid     
   bifurcations, right more than the left without hemodynamically         sign
ificant carotid arterial stenosis by NASCET criteria. No         significant wilfrido
tebral artery stenosis.                              SL:  RASHEL-H        PAGE  
1                       Signed Report                    (CONTINUED)  FAX: Kimmy Duenas   [phone]    [place]:   St: ADM FAX: Jonny Lerner MD   [phone]   ---------------------------------------------------
----------------------------  Name:   TY PORTER                    [facility] [place]                    : 1950  Age/S: 69/M           [address]         Unit #: [id number]      Loc: [address]       [place] [address]    
            Phys: Kimmy Tomlinson MD                                        
     Acct: [id number] Dis Date:               Status: ADM IN                  
              PHONE #: [phone]     Exam Date:     10/05/2019     1843      
            FAX #: [phone]     Reason: right sided stroke                  
              EXAMS:                                               CPT CODE:    
 [id number] MRA NECK W/O CONT                          51100               <
Continued>               ** Electronically Signed by CRAIG Gray **   
       **              on 10/05/2019 at 1930             **                     
Reported and signed by: Alice Gray M.D.                                  
   CC: Kimmy Vargas MD; Jonny Love MD                                 
                                                              Technologist: 
RT Rush(MR)(CT)                           Trnscrd Date/Time/By: 
10/05/2019 () : By: [email or web address]           Orig Print D/T: S: 10/05/2019 ()
                        PAGE  2                       Signed Report             
                 - MRA HEAD W/O CONTRAST2019-10-05 19:23:00 FAX: Kimmy Duenas   [phone]    [place]:   St: ADM FAX: Jonny Lerner MD   [phone]   ----------------------------------------
---------------------------------------  Name:   [place]                    : 1950  Age/S: 69/M           [address] [place] [address] Blvd         Unit #: [id number]      Loc: [facility]
X [address]                 Phys: Kimmy Tomlinson MD                            
                 Acct: [id number] Dis Date:               Status: ADM IN      
                          PHONE #: [phone]     Exam Date:     10/05/2019   
 1843                   FAX #: [phone]     Reason: right sided stroke      
                          EXAMS:                                               
CPT CODE:      [id number] MRA HEAD W/O CONTRAST                      [address]       
            Clinical Indication: Right-sided stroke.               Comparison: 
MRI brain study dated 10/1/2019.               Technique: Magnetic resonance 
angiography of the [place] was       performed without contrast. 3D rec
onstructed images were created.               FINDINGS: The petrous, cavernous, 
ophthalmic and supraclinoid portions       of the bilateral internal carotid art
eries have normal caliber. The A1       and A2 segments of the bilateral anterio
r cerebral arteries are       unremarkable. The M1 and M2 segments of bilateral 
middle cerebral       arteries and branches have normal caliber. The anterior co
mmunicating       artery is unremarkable.               Bilateral vertebral eugenia
stephan, basilar artery, and posterior cerebral       arteries are unremarkable. Po
sterior communicating arteries are       unremarkable.                There is n
o aneurysm, vascular malformation, or significant       atherosclerotic disease.
                IMPRESSION:                   Unremarkable MR angiography of the
[place].  No major         flow-limiting stenosis, occlusion or aneur
ysm.                             SL:  APATIL-H                   ** Electronical
ly Signed by CRAIG Gray **           **              on 10/05/2019 at
1923             **                      Reported and signed by: Alice renteria M.D.          CC: Kimmy Vargas MD; Jonny Boyd
chnologist: Wendy Roland, RT(MR)(CT)                           Trnscrd Date/
Time/By: 10/05/2019 () : By: [email or web address]           Orig Print D/T: S: 10/05/20
19 ()                         PAGE  1                       Signed Report   
                           BASIC METABOLIC PANEL2019-10-05 14:04:00* 



             Test Item    Value        Reference Range Interpretation Comments

 

             SODIUM (test code = NA) 143 mEq/L    134-147      N             

 

             POTASSIUM (test code = K) 6.1 mEq/L    3.4-5.0      HH            

 

             CHLORIDE (test code = CL) 111 mEq/L    100-108      H             

 

             CARBON DIOXIDE (test code = CO2) 25 mEq/L     21-33        N       

      

 

             ANION GAP (test code = GAP) 13           0-20         N            

 

 

             GLUCOSE (test code = GLU) 125 mg/dL           H             

 

             BLOOD UREA NITROGEN (test code = BUN) 31 mg/dL     7-18         H  

           

 

             GLOMERULAR FILTRATION RATE (test code = GFR) 13.4         70-80    

    L            Units of measure = 

ml/min/1.73 m2

 

             CREATININE (test code = CREAT) 4.4 mg/dL    0.6-1.3      H         

    

 

             CALCIUM (test code = CA) 7.3 mg/dL    8.0-10.5     L             





[id number] 14:04:00* 



             Test Item    Value        Reference Range Interpretation Comments

 

             PHOSPHOROUS (test code = PHOS) 5.4 MG/DL    2.5-4.9      H         

    





MAGNESIUM2019-10-05 14:04:00* 



             Test Item    Value        Reference Range Interpretation Comments

 

             MAGNESIUM (test code = MAG) 2.20 mg/dL   1.8-2.4      N            

 





CALCIUM IONIZED2019-10-05 14:04:00* 



             Test Item    Value        Reference Range Interpretation Comments

 

             CALCIUM IONIZED (test code = TASHIA) 0.97 MMOL/L  1.12-1.32    L      

       





BASIC METABOLIC PANEL2019-10-05 13:20:00* 



             Test Item    Value        Reference Range Interpretation Comments

 

             SODIUM (test code = NA) 143 mEq/L    134-147      N             

 

             POTASSIUM (test code = K) 6.1 mEq/L    3.4-5.0      HH            

 

             CHLORIDE (test code = CL) 111 mEq/L    100-108      H             

 

             CARBON DIOXIDE (test code = CO2) 25 mEq/L     21-33        N       

      

 

             ANION GAP (test code = GAP) 13           0-20         N            

 

 

             GLUCOSE (test code = GLU) 125 mg/dL           H             

 

             BLOOD UREA NITROGEN (test code = BUN) 31 mg/dL     7-18         H  

           

 

             GLOMERULAR FILTRATION RATE (test code = GFR) 13.4         70-80    

    L            Units of measure = 

ml/min/1.73 m2

 

             CREATININE (test code = CREAT) 4.4 mg/dL    0.6-1.3      H         

    

 

             CALCIUM (test code = CA) 7.3 mg/dL    8.0-10.5     L             





PHOSPHOROUS2019-10-05 13:20:00* 



             Test Item    Value        Reference Range Interpretation Comments

 

             PHOSPHOROUS (test code = PHOS) 5.4 MG/DL    2.5-4.9      H         

    





MAGNESIUM2019-10-05 13:20:00* 



             Test Item    Value        Reference Range Interpretation Comments

 

             MAGNESIUM (test code = MAG) 2.20 mg/dL   1.8-2.4      N            

 





CALCIUM IONIZED2019-10-05 13:20:00* 



             Test Item    Value        Reference Range Interpretation Comments

 

             CALCIUM IONIZED (test code = TASHIA)  MMOL/L      1.12-1.32           

       





GLUBED2019-10-05 13:04:00* 



             Test Item    Value        Reference Range Interpretation Comments

 

             GLUBED (test code = GLUBED) 91 MG/DL            N            

Performed by certified  at

 [facility]





- XR CHEST 1 -10-05 11:13:00 FAX:         Sary Naidu NP  [phone]
   [place]:   St: ADM FAX: Jonny Lerner MD   [phone]   
------------------------------------------------------------------------------- 
Name:   TY PORTER                    [facility]                    :
1950  Age/S: 69/M           [address] [place] [address]         Unit #: 
[id number]      Loc: [address] [address]                 Phys: 
Sary Naidu NP                                                 Acct: 
[id number] Dis Date:               Status: ADM IN                             
   PHONE #: [phone]     Exam Date:     10/05/2019     1046                 
 FAX #: [phone]     Reason: follow up                                      
   EXAMS:                                               CPT CODE:      [id number]
XR CHEST 1 V                               [address]                    EXAM:  
Single view  AP chest.               EXAM DATE:  10/5/2019 at 1016 hours        
      CLINICAL HISTORY:  follow up               COMPARISON:  2019 at
0542 hours                       Endotracheal tube tip is approximately 6.8 cm 
above the level of the       steve, enteric tube tip is projected off the image
in the upper       abdomen and dialysis catheter from the left is present with 
its tip in       the region of the right atrium.               Cardiomediastinal
silhouette is stable.       Bilateral pulmonary infiltrates are again identified
more significant       in the left upper lung.  This finding is not 
significantly changed       compared to the prior exam.  No definite effusions 
are identified.       Visualized osseous structures demonstrate no acute 
abnormalities.                 IMPRESSION: Stable bilateral pulmonary infi
ltrates left greater than         right.                  ** Electronically Sign
ed by CRAIG Calderón **          **              on 10/05/2019 at 1113  
           **                      Reported and signed by: MARCO Mullen                CC: Sary Naidu NP; Jonny DAVIS
echnologist: Chon Wagoner RT(R)                                      Trnscrd Date
/Time/By: 10/05/2019 (1113) : By: MunaCER           Orig Print D/T: S: 10/05/2
019 (1116)                         PAGE  1                       Signed Report  
                            CBC W/AUTO DIFF2019-10-05 10:55:00* 



             Test Item    Value        Reference Range Interpretation Comments

 

             WHITE BLOOD CELL (test code = WBC) 10.69 x10 3/uL 4.5-11.0     N   

          

 

             RED BLOOD CELL (test code = RBC) 2.24 x10 6/uL 4.00-5.60    L      

       

 

             HEMOGLOBIN (test code = HGB) 6.7 g/dL     12.5-16.9    L           

  

 

             HEMATOCRIT (test code = HCT) 21.3 %       37.5-50.7    L           

  

 

             MEAN CELL VOLUME (test code = MCV) 95.1 fL      81.0-99.0    N     

        

 

             MEAN CELL HGB (test code = MCH) 29.9 pg      27.0-33.0    N        

     

 

             MEAN CELL HGB CONCETRATION (test code = MCHC) 31.5 g/dL    33.0-37.

0    L             

 

             RED CELL DISTRIBUTION WIDTH CV (test code = RDW) 16.2 %       11.5-

14.5    H             

 

             RED CELL DISTRIBUTION WIDTH SD (test code = RDW-SD) 56.4 fL      37

.0-54.0    H             

 

             PLATELET COUNT (test code = PLT) 233 x10 3/uL 150-400      N       

      

 

             MEAN PLATELET VOLUME (test code = MPV) 9.7 fL       7.0-9.0      H 

            

 

             MANUAL DIFF REQUIRED (test code = MDIFF) YES                       

              





COMMENTS: Daily while on HeparinWBC DIFFERENTIAL2019-10-05 10:55:00* 



             Test Item    Value        Reference Range Interpretation Comments

 

             ANISOCYTOSIS (test code = ANISO)                                   

      

 

             PLATELET ESTIMATE (test code = PLTEST)  THOUSAND    ADEQUATE       

            





COMMENTS: Daily while on HeparinCBC W/AUTO DIFF2019-10-05 10:55:00* 



             Test Item    Value        Reference Range Interpretation Comments

 

             WHITE BLOOD CELL (test code = WBC) 10.69 x10 3/uL 4.5-11.0     N   

          

 

             RED BLOOD CELL (test code = RBC) 2.24 x10 6/uL 4.00-5.60    L      

       

 

             HEMOGLOBIN (test code = HGB) 6.7 g/dL     12.5-16.9    L           

  

 

             HEMATOCRIT (test code = HCT) 21.3 %       37.5-50.7    L           

  

 

             MEAN CELL VOLUME (test code = MCV) 95.1 fL      81.0-99.0    N     

        

 

             MEAN CELL HGB (test code = MCH) 29.9 pg      27.0-33.0    N        

     

 

             MEAN CELL HGB CONCETRATION (test code = MCHC) 31.5 g/dL    33.0-37.

0    L             

 

             RED CELL DISTRIBUTION WIDTH CV (test code = RDW) 16.2 %       11.5-

14.5    H             

 

             RED CELL DISTRIBUTION WIDTH SD (test code = RDW-SD) 56.4 fL      37

.0-54.0    H             

 

             PLATELET COUNT (test code = PLT) 233 x10 3/uL 150-400      N       

      

 

             MEAN PLATELET VOLUME (test code = MPV) 9.7 fL       7.0-9.0      H 

            

 

             MANUAL DIFF REQUIRED (test code = MDIFF) YES                       

              





COMMENTS: Daily while on HeparinWBC DIFFERENTIAL2019-10-05 10:55:00* 



             Test Item    Value        Reference Range Interpretation Comments

 

             SEGMENTED NEUTROPHILS (test code = SEG) 80.9 %       37-69        H

             

 

             LYMPHOCYTE (test code = LYMPH) 10.9 %       23-55        L         

    

 

             MONOCYTE (test code = MON) 6.4 %        0-10         N             

 

             MYELOCYTE (test code = MYELO) 1.8 %        0.0-0.0      H          

   

 

             POLYCHROMASIA (test code = POLC) 1+                                

      

 

             ANISOCYTOSIS (test code = ANISO) 1+                                

      

 

             MICROCYTOSIS (test code = MICR) 1+                                 

     

 

             PLATELET ESTIMATE (test code = PLTEST) Adequate THOUSAND ADEQUATE  

                 





COMMENTS: Daily while on HeparinCBC W/AUTO DIFF2019-10-05 10:55:00* 



             Test Item    Value        Reference Range Interpretation Comments

 

             WHITE BLOOD CELL (test code = WBC) 10.69 x10 3/uL 4.5-11.0     N   

          

 

             RED BLOOD CELL (test code = RBC) 2.24 x10 6/uL 4.00-5.60    L      

       

 

             HEMOGLOBIN (test code = HGB) 6.7 g/dL     12.5-16.9    L           

  

 

             HEMATOCRIT (test code = HCT) 21.3 %       37.5-50.7    L           

  

 

             MEAN CELL VOLUME (test code = MCV) 95.1 fL      81.0-99.0    N     

        

 

             MEAN CELL HGB (test code = MCH) 29.9 pg      27.0-33.0    N        

     

 

             MEAN CELL HGB CONCETRATION (test code = MCHC) 31.5 g/dL    33.0-37.

0    L             

 

             RED CELL DISTRIBUTION WIDTH CV (test code = RDW) 16.2 %       11.5-

14.5    H             

 

             RED CELL DISTRIBUTION WIDTH SD (test code = RDW-SD) 56.4 fL      37

.0-54.0    H             

 

             PLATELET COUNT (test code = PLT) 233 x10 3/uL 150-400      N       

      

 

             MEAN PLATELET VOLUME (test code = MPV) 9.7 fL       7.0-9.0      H 

            

 

             MANUAL DIFF REQUIRED (test code = MDIFF) YES                       

              





COMMENTS: Daily while on HeparinWBC DIFFERENTIAL2019-10-05 10:55:00* 



             Test Item    Value        Reference Range Interpretation Comments

 

             ANISOCYTOSIS (test code = ANISO)                                   

      

 

             PLATELET ESTIMATE (test code = PLTEST)  THOUSAND    ADEQUATE       

            





COMMENTS: Daily while on HeparinVANCOMYCIN2019-10-05 09:51:00* 



             Test Item    Value        Reference Range Interpretation Comments

 

             VANCOMYCIN (test code = VANCO) 12.1 mcg/mL                         

    





COMMENTS: DRAW RANDOM VANCOMYCIN LEVEL NOWBASIC METABOLIC PANEL2019-10-05 
08:45:00* 



             Test Item    Value        Reference Range Interpretation Comments

 

             SODIUM (test code = NA) 145 mEq/L    134-147      N             

 

             POTASSIUM (test code = K) 3.4 mEq/L    3.4-5.0      N             

 

             CHLORIDE (test code = CL) 111 mEq/L    100-108      H             

 

             CARBON DIOXIDE (test code = CO2) 26 mEq/L     21-33        N       

      

 

             ANION GAP (test code = GAP) 11           0-20         N            

 

 

             GLUCOSE (test code = GLU) 103 mg/dL           N             

 

             BLOOD UREA NITROGEN (test code = BUN) 31 mg/dL     7-18         H  

           

 

             GLOMERULAR FILTRATION RATE (test code = GFR) 13.4         70-80    

    L            Units of measure = 

ml/min/1.73 m2

 

             CREATININE (test code = CREAT) 4.4 mg/dL    0.6-1.3      H         

    

 

             CALCIUM (test code = CA) 7.6 mg/dL    8.0-10.5     L             





[id number] 08:45:00* 



             Test Item    Value        Reference Range Interpretation Comments

 

             PHOSPHOROUS (test code = PHOS) 4.3 MG/DL    2.5-4.9                

    





[id number] 08:45:00* 



             Test Item    Value        Reference Range Interpretation Comments

 

             MAGNESIUM (test code = MAG) 2.20 mg/dL   1.8-2.4      N            

 





TROPONIN--10-05 08:45:00* 



             Test Item    Value        Reference Range Interpretation Comments

 

             TROPONIN-I (test code = TROPI) 1.890 ng/mL  0.000-0.045  HH        

    Negative:            

<= 0.045 Positive:             >= 0.046 Correlation with serial results, other 
cardiac markers andclinical findings is necessary to determine the 
clinicalsignificance of this result. Results using different methodologies 
should not be comparedto one another as quantitative results may vary by method.





CALCIUM IONIZED2019-10-05 08:45:00* 



             Test Item    Value        Reference Range Interpretation Comments

 

             CALCIUM IONIZED (test code = TASHIA) 1.09 MMOL/L  1.12-1.32    L      

       





BASIC METABOLIC PANEL2019-10-05 08:25:00* 



             Test Item    Value        Reference Range Interpretation Comments

 

             SODIUM (test code = NA) 145 mEq/L    134-147      N             

 

             POTASSIUM (test code = K) 3.4 mEq/L    3.4-5.0      N             

 

             CHLORIDE (test code = CL) 111 mEq/L    100-108      H             

 

             CARBON DIOXIDE (test code = CO2) 26 mEq/L     21-33        N       

      

 

             ANION GAP (test code = GAP) 11           0-20         N            

 

 

             GLUCOSE (test code = GLU) 103 mg/dL           N             

 

             BLOOD UREA NITROGEN (test code = BUN) 31 mg/dL     7-18         H  

           

 

             GLOMERULAR FILTRATION RATE (test code = GFR) 13.4         70-80    

    L            Units of measure = 

ml/min/1.73 m2

 

             CREATININE (test code = CREAT) 4.4 mg/dL    0.6-1.3      H         

    

 

             CALCIUM (test code = CA) 7.6 mg/dL    8.0-10.5     L             





PHOSPHOROUS2019-10-05 08:25:00* 



             Test Item    Value        Reference Range Interpretation Comments

 

             PHOSPHOROUS (test code = PHOS) 4.3 MG/DL    2.5-4.9                

    





MAGNESIUM2019-10-05 08:25:00* 



             Test Item    Value        Reference Range Interpretation Comments

 

             MAGNESIUM (test code = MAG) 2.20 mg/dL   1.8-2.4      N            

 





TROPONIN--10-05 08:25:00* 



             Test Item    Value        Reference Range Interpretation Comments

 

             TROPONIN-I (test code = TROPI) 1.890 ng/mL  0.000-0.045  HH        

    Negative:            

<= 0.045 Positive:             >= 0.046 Correlation with serial results, other 
cardiac markers andclinical findings is necessary to determine the 
clinicalsignificance of this result. Results using different methodologies 
should not be comparedto one another as quantitative results may vary by method.





CALCIUM IONIZED2019-10-05 08:25:00* 



             Test Item    Value        Reference Range Interpretation Comments

 

             CALCIUM IONIZED (test code = TASHIA)  MMOL/L      1.12-1.32           

       





THROMBOPLASTIN TIME PARTIAL2019-10-05 07:46:00* 



             Test Item    Value        Reference Range Interpretation Comments

 

             THROMBOPLASTIN TIME PARTIAL (test code = PTT) 74.5 Seconds 25.0-39.

5    H                

Therapeutic Range: 50.4 - 88.3 Seconds        **** Effective 2019 ****





CBC W/AUTO DIFF2019-10-05 07:33:00* 



             Test Item    Value        Reference Range Interpretation Comments

 

             WHITE BLOOD CELL (test code = WBC) 10.69 x10 3/uL 4.5-11.0     N   

          

 

             RED BLOOD CELL (test code = RBC) 2.24 x10 6/uL 4.00-5.60    L      

       

 

             HEMOGLOBIN (test code = HGB) 6.7 g/dL     12.5-16.9    L           

  

 

             HEMATOCRIT (test code = HCT) 21.3 %       37.5-50.7    L           

  

 

             MEAN CELL VOLUME (test code = MCV) 95.1 fL      81.0-99.0    N     

        

 

             MEAN CELL HGB (test code = MCH) 29.9 pg      27.0-33.0    N        

     

 

             MEAN CELL HGB CONCETRATION (test code = MCHC) 31.5 g/dL    33.0-37.

0    L             

 

             RED CELL DISTRIBUTION WIDTH CV (test code = RDW) 16.2 %       11.5-

14.5    H             

 

             RED CELL DISTRIBUTION WIDTH SD (test code = RDW-SD) 56.4 fL      37

.0-54.0    H             

 

             PLATELET COUNT (test code = PLT) 233 x10 3/uL 150-400      N       

      

 

             MEAN PLATELET VOLUME (test code = MPV) 9.7 fL       7.0-9.0      H 

            

 

             NEUTROPHIL % (test code = NT%)  %           56.0-77.0              

    

 

             LYMPHOCYTE % (test code = LY%)  %           14.0-32.0              

    

 

             NEUTROPHIL # (test code = NT#)  x10 3/uL    2.0-7.6                

    

 

             LYMPHOCYTE # (test code = LY#)  x10 3/uL    1.0-3.8                

    

 

             MANUAL DIFF REQUIRED (test code = MDIFF)                           

              





COMMENTS: Daily while on HeparinGLUBED2019-10-05 06:35:00* 



             Test Item    Value        Reference Range Interpretation Comments

 

             GLUBED (test code = GLUBED) 100 MG/DL           N            

Performed by certified  

at  [facility]





- XR CHEST 1 -10-05 06:23:00 FAX:         Sary Naidu NP  [phone]
   [place]:   St: ADM FAX: Jonny Lerner MD   [phone]   
------------------------------------------------------------------------------- 
Name:   TY PORTER                    [facility]                    :
1950  Age/S: 69/M           [address] [place] [address]         Unit #: 
[id number]      Loc: [address]       [place] [address]                 Phys: 
Sary Naidu NP                                                 Acct: 
[id number] Dis Date:               Status: ADM IN                             
   PHONE #: [phone]     Exam Date:     10/05/2019     0609                 
 FAX #: [phone]     Reason: resp failure                                   
   EXAMS:                                               CPT CODE:      [id number]
XR CHEST 1 V                               [id number]                    Chest, 
single view dated 10/5/2019.               HISTORY: Respiratory failure.  
Bilateral pneumonia.               Comparison is made to multiple prior studies 
dating back to 10/1/2019.               The tip of the endotracheal tube 
projects approximately 6 cm above the       steve.  A right jugular h
emodialysis catheter is present with the tip       projecting near the caval atr
ial junction.  A pneumothorax is not       identified.  A nasogastric tube is pr
esent with the tip likely       positioned in the gastric antrum.  The heart is 
normal in size.  The       cardiomediastinal shadow is stable.  Bilateral pulmon
essence infiltrates       are again identified with the left lung being more signifi
cantly       involved than the right.  The infiltrates have shown gradual       
improvement since 10/1/2019.  No acute pleural space abnormalities are       det
ected.                 IMPRESSION:         1.  Gradually improving bilateral pul
monary infiltrates when compared         to multiple prior studies dating back t
o 10/1/2019.                   SL: 131                ** Electronically Signed Halina Stone **        **                on 10/05/2019 at 06  
             **                      Reported and signed by: Elton marrero M.D.                CC: Sary Naidu NP; Jonny Love MD              
                                                                                
   Technologist: ANNMARIE Davis)                                      Trnscrd 
Date/Time/By: 10/05/2019 (623) : By: MattM           Orig Print D/T: S: 1
 (06)                         PAGE  1                       Signed Re
port                               THROMBOPLASTIN TIME PARTIAL2019-10-05 
01:50:00* 



             Test Item    Value        Reference Range Interpretation Comments

 

             THROMBOPLASTIN TIME PARTIAL (test code = PTT) 68.6 Seconds 25.0-39.

5    H                

Therapeutic Range: 50.4 - 88.3 Seconds        **** Effective 2019 ****





GLUBED2019-10-04 23:40:00* 



             Test Item    Value        Reference Range Interpretation Comments

 

             GLUBED (test code = GLUBED) 108 MG/DL           N            

Performed by certified  

at  [facility]





THROMBOPLASTIN TIME PARTIAL2019-10-04 18:46:00* 



             Test Item    Value        Reference Range Interpretation Comments

 

             THROMBOPLASTIN TIME PARTIAL (test code = PTT) 69.6 Seconds 25.0-39.

5    H                

Therapeutic Range: 50.4 - 88.3 Seconds        **** Effective 2019 ****





GLUBED2019-10-04 18:03:00* 



             Test Item    Value        Reference Range Interpretation Comments

 

             GLUBED (test code = GLUBED) 107 MG/DL           N            

Performed by certified  

at  [facility]





TROPONIN--10-04 17:34:00* 



             Test Item    Value        Reference Range Interpretation Comments

 

             TROPONIN-I (test code = TROPI) 2.730 ng/mL  0.000-0.045  HH        

    Negative:            

<= 0.045 Positive:             >= 0.046 Correlation with serial results, other 
cardiac markers andclinical findings is necessary to determine the 
clinicalsignificance of this result. Results using different methodologies 
should not be comparedto one another as quantitative results may vary by method.





GLUBED2019-10-04 11:52:00* 



             Test Item    Value        Reference Range Interpretation Comments

 

             GLUBED (test code = GLUBED) 91 MG/DL            N            

Performed by certified  at

 [facility]





THROMBOPLASTIN TIME PARTIAL2019-10-04 10:45:00* 



             Test Item    Value        Reference Range Interpretation Comments

 

             THROMBOPLASTIN TIME PARTIAL (test code = PTT) 53.6 Seconds 25.0-39.

5    H                

Therapeutic Range: 50.4 - 88.3 Seconds        **** Effective 2019 ****





GLUBED2019-10-04 07:10:00* 



             Test Item    Value        Reference Range Interpretation Comments

 

             GLUBED (test code = GLUBED) 89 MG/DL            N            

Performed by certified  at

 [facility]





- XR CHEST 1 -10-04 06:33:00 FAX:         Sary Naidu NP  [phone]
   [place]:   St: [facility] FAX: Jonny Lerner MD   [phone]   
------------------------------------------------------------------------------- 
Name:   TY PORTER                    [facility]                    :
1950  Age/S: 69/M           [address] [place] [address]         Unit #: 
[id number]      Loc: [address] [address]                 Phys: 
Sary Naidu NP                                                 Acct: 
[id number] Dis Date:               Status: ADM IN                             
   PHONE #: [phone]     Exam Date:     10/04/2019     05                 
 FAX #: [phone]     Reason: RESP FAILURE                                   
   EXAMS:                                               CPT CODE:      [id number]
XR CHEST 1 V                               [id number]                    Chest, 
single view dated 10/4/2019.               HISTORY: Respiratory failure.  
Bilateral pneumonia.               Comparison is made to a prior study dated 
10/3/2019.               The positioning of the endotracheal tube and right 
jugular       hemodialysis catheter has not significantly changed in the 
interim.  A       pneumothorax is not identified.  The nasogastric tube has been
      advanced with the sidehole and tip now positioned off the inferior to     
 the image.  The cardiomediastinal shadow is stable.  Bilateral       pulmonary 
infiltrates persist with the left lung being more       extensively involved 
than the right.  The aeration of the right lung       appears to have improved 
in the interim.  No acute pleural space       abnormalities are detected.       
         IMPRESSION:         1.  Persistent bilateral pulmonary infiltrates with
the left lung         being more extensively involved than the right.  The ae
ration of the         right lung has shown improvement when compared the prior s
dy dated         10/3/2019.                   SL: 131                ** Electr
onically Signed by CRAIG Stone **        **                on 10/
2019 at 0633                **                      Reported and signed by: BIANKA Stone M.D.               CC: Sary Naidu NP; Jonny Love MD                                                                              
                    Technologist: Marco Antonio Rodney, RT(R)(CT); Alex Gudino, RT(R)
        [facility] Date/Time/By: 10/04/2019 (06) : By: Les           Orig 
Print D/T: S: 10/04/2019 (0636)                         PAGE  1                 
     Signed Report                               GLUBED2019-10-04 05:40:00* 



             Test Item    Value        Reference Range Interpretation Comments

 

             GLUBED (test code = GLUBED) 87 MG/DL            N            

Performed by certified  at

 [facility]





ARTERIAL BLOOD GAS2019-10-04 04:59:00* 



             Test Item    Value        Reference Range Interpretation Comments

 

             ARTERIAL BLOOD GAS PH (test code = PHA) 7.514        7.35-7.45    H

             

 

             ARTERIAL BLOOD GAS PCO2 (test code = PCO2A) 33.3 mmHg    35-45     

   L             

 

             ARTERIAL BLOOD GAS PO2 (test code = PO2A) 157 mmHg           

 H             

 

             BICARBONATE TOTAL HCO3 (test code = HCO3) 27.0 mmol/L  22.0-26.0   

 H             

 

             BASE EXCESS (test code = URIEL) 4.0 mmol/L   -4-4         N          

   

 

             ABG O2 SATURATION (test code = SATA) 100 %               N   

          

 

             FIO2 (test code = FIO2A) 35 %                                    

 

             ABG DELIVERY (test code = MATTY) Vent                               

     

 

             ABG VENT MODE (test code = MODEA) AC v con                         

       

 

             ABG VENT RESP RATE (test code = RRA) 16 /MIN                       

          

 

             ABG TIDAL VOLUME (test code = TVA) 550 ml                          

        

 

             ABG PEEP (test code = PEEPA) 5 cmH2O                               

 Performed by certified  at  

[facility]

 

             ABG TEMPERATURE (test code = TEMPA) 97.6 F                         

         

 

             ABG SITE (test code = SITEA) R Rad                                 

  

 

             PREDICTED AA GRADIENT (test code = AP) 54                          

            

 

             PREDICTED PO2 (test code = OP) 155                                 

    

 

             a/A RATIO (test code = RATIO) 0.75                                 

   

 

             TCO2 ARTERIAL (test code = TCO2A) 28                               

       

 

             A-A GRADIENT (test code = AAGRADE) 53                              

        





ACUTE HEPATITIS PANEL2019-10-04 04:07:00* 



             Test Item    Value        Reference Range Interpretation Comments

 

             AB HEPATITIS A IGM (test code = HAVMAB) NON REACTIVE INDEX NON REAC

T.                 

 

             AG HEPATITIS B SURFACE (test code = HBSAG) NON REACTIVE INDEX NonRe

active                

 

             AB HEPATITIS B CORE IGM (test code = HBCMAB) NON REACTIVE INDEX NON

 REACT.                 

 

             AB HEPATITIS C (test code = HCVAB) NON REACTIVE INDEX NON REACT.   

              





COMMENTS: At start of hemodialysisAB HEPATITIS B SURFACE2019-10-04 04:07:00* 



             Test Item    Value        Reference Range Interpretation Comments

 

             AB HEPATITIS B SURFACE (test code = HBSAB) < 3.1 mIU/mL Immunity>9.

9 L              Status 

of Immunity                     Anti-HBs Level  ------------------              
      --------------Inconsistent with Immunity                   0.0 - 
9.9Consistent with Immunity                          >9.9Performed At: [facility] 
[facility] [address] [id number] Kyle L MD 
Ph:[phone]





COMMENTS: At start of hemodialysisCBC W/AUTO DIFF2019-10-04 04:01:00* 



             Test Item    Value        Reference Range Interpretation Comments

 

             WHITE BLOOD CELL (test code = WBC) 9.81 x10 3/uL 4.5-11.0     N    

         

 

             RED BLOOD CELL (test code = RBC) 2.57 x10 6/uL 4.00-5.60    L      

       

 

             HEMOGLOBIN (test code = HGB) 7.6 g/dL     12.5-16.9    L           

  

 

             HEMATOCRIT (test code = HCT) 23.7 %       37.5-50.7    L           

  

 

             MEAN CELL VOLUME (test code = MCV) 92.2 fL      81.0-99.0    N     

        

 

             MEAN CELL HGB (test code = MCH) 29.6 pg      27.0-33.0    N        

     

 

             MEAN CELL HGB CONCETRATION (test code = MCHC) 32.1 g/dL    33.0-37.

0    L             

 

             RED CELL DISTRIBUTION WIDTH CV (test code = RDW) 16.1 %       11.5-

14.5    H             

 

             RED CELL DISTRIBUTION WIDTH SD (test code = RDW-SD) 53.6 fL      37

.0-54.0    N             

 

             PLATELET COUNT (test code = PLT) 259 x10 3/uL 150-400      N       

      

 

             MEAN PLATELET VOLUME (test code = MPV) 9.2 fL       7.0-9.0      H 

            

 

             MANUAL DIFF REQUIRED (test code = MDIFF) YES                       

              





COMMENTS: Daily while on HeparinWBC DIFFERENTIAL2019-10-04 04:01:00* 



             Test Item    Value        Reference Range Interpretation Comments

 

             SEGMENTED NEUTROPHILS (test code = SEG) 81.5 %       37-69        H

             

 

             LYMPHOCYTE (test code = LYMPH) 13.0 %       23-55        L         

    

 

             MONOCYTE (test code = MON) 3.7 %        0-10         N             

 

             BASOPHIL (test code = BASO) 0.9 %        0.0-2.0      N            

 

 

             MYELOCYTE (test code = MYELO) 0.9 %        0.0-0.0      H          

   

 

             POIKILOCYTOSIS (test code = POIK) SLIGHT                           

       

 

             ANISOCYTOSIS (test code = ANISO) 1+                                

      

 

             MICROCYTOSIS (test code = MICR) 1+                                 

     

 

             TEAR DROP CELLS (test code = TEAR) FEW                             

        

 

             PLATELET ESTIMATE (test code = PLTEST) Adequate THOUSAND ADEQUATE  

                 

 

             PLATELET MORPHOLOGY (test code = PLTMORPH) NORMAL                  

               LARGE PLTS SEEN





COMMENTS: Daily while on HeparinBASIC METABOLIC PANEL2019-10-04 03:58:00* 



             Test Item    Value        Reference Range Interpretation Comments

 

             SODIUM (test code = NA) 142 mEq/L    134-147      N             

 

             POTASSIUM (test code = K) 3.4 mEq/L    3.4-5.0      N             

 

             CHLORIDE (test code = CL) 108 mEq/L    100-108      N             

 

             CARBON DIOXIDE (test code = CO2) 30 mEq/L     21-33                

      

 

             ANION GAP (test code = GAP) 7            0-20         N            

 

 

             GLUCOSE (test code = GLU) 93 mg/dL            N             

 

             BLOOD UREA NITROGEN (test code = BUN) 19 mg/dL     7-18         H  

           

 

             GLOMERULAR FILTRATION RATE (test code = GFR) 16.8         70-80    

    L            Units of measure = 

ml/min/1.73 m2

 

             CREATININE (test code = CREAT) 3.6 mg/dL    0.6-1.3      H         

    

 

             CALCIUM (test code = CA) 7.2 mg/dL    8.0-10.5     L             





[id number] 03:58:00* 



             Test Item    Value        Reference Range Interpretation Comments

 

             PHOSPHOROUS (test code = PHOS) 2.6 MG/DL    2.5-4.9                

    





MAGNESIUM2019-10-04 03:58:00* 



             Test Item    Value        Reference Range Interpretation Comments

 

             MAGNESIUM (test code = MAG) 2.10 mg/dL   1.8-2.4                   

 





TROPONIN--10-04 03:58:00* 



             Test Item    Value        Reference Range Interpretation Comments

 

             TROPONIN-I (test code = TROPI) 3.390 ng/mL  0.000-0.045  HH        

    Negative:            

<= 0.045 Positive:             >= 0.046 Correlation with serial results, other 
cardiac markers andclinical findings is necessary to determine the 
clinicalsignificance of this result. Results using different methodologies 
should not be comparedto one another as quantitative results may vary by method.





CALCIUM IONIZED2019-10-04 03:58:00* 



             Test Item    Value        Reference Range Interpretation Comments

 

             CALCIUM IONIZED (test code = TASHIA) 1.05 MMOL/L  1.12-1.32    L      

       





CBC W/AUTO DIFF2019-10-04 03:57:00* 



             Test Item    Value        Reference Range Interpretation Comments

 

             WHITE BLOOD CELL (test code = WBC) 9.81 x10 3/uL 4.5-11.0     N    

         

 

             RED BLOOD CELL (test code = RBC) 2.57 x10 6/uL 4.00-5.60    L      

       

 

             HEMOGLOBIN (test code = HGB) 7.6 g/dL     12.5-16.9    L           

  

 

             HEMATOCRIT (test code = HCT) 23.7 %       37.5-50.7    L           

  

 

             MEAN CELL VOLUME (test code = MCV) 92.2 fL      81.0-99.0    N     

        

 

             MEAN CELL HGB (test code = MCH) 29.6 pg      27.0-33.0    N        

     

 

             MEAN CELL HGB CONCETRATION (test code = MCHC) 32.1 g/dL    33.0-37.

0    L             

 

             RED CELL DISTRIBUTION WIDTH CV (test code = RDW) 16.1 %       11.5-

14.5    H             

 

             RED CELL DISTRIBUTION WIDTH SD (test code = RDW-SD) 53.6 fL      37

.0-54.0    N             

 

             PLATELET COUNT (test code = PLT) 259 x10 3/uL 150-400      N       

      

 

             MEAN PLATELET VOLUME (test code = MPV) 9.2 fL       7.0-9.0      H 

            

 

             MANUAL DIFF REQUIRED (test code = MDIFF) YES                       

              





COMMENTS: Daily while on HeparinWBC DIFFERENTIAL2019-10-04 03:57:00* 



             Test Item    Value        Reference Range Interpretation Comments

 

             ANISOCYTOSIS (test code = ANISO)                                   

      

 

             PLATELET ESTIMATE (test code = PLTEST)  THOUSAND    ADEQUATE       

            





COMMENTS: Daily while on HeparinCBC W/AUTO DIFF2019-10-04 03:57:00* 



             Test Item    Value        Reference Range Interpretation Comments

 

             WHITE BLOOD CELL (test code = WBC) 9.81 x10 3/uL 4.5-11.0     N    

         

 

             RED BLOOD CELL (test code = RBC) 2.57 x10 6/uL 4.00-5.60    L      

       

 

             HEMOGLOBIN (test code = HGB) 7.6 g/dL     12.5-16.9    L           

  

 

             HEMATOCRIT (test code = HCT) 23.7 %       37.5-50.7    L           

  

 

             MEAN CELL VOLUME (test code = MCV) 92.2 fL      81.0-99.0    N     

        

 

             MEAN CELL HGB (test code = MCH) 29.6 pg      27.0-33.0    N        

     

 

             MEAN CELL HGB CONCETRATION (test code = MCHC) 32.1 g/dL    33.0-37.

0    L             

 

             RED CELL DISTRIBUTION WIDTH CV (test code = RDW) 16.1 %       11.5-

14.5    H             

 

             RED CELL DISTRIBUTION WIDTH SD (test code = RDW-SD) 53.6 fL      37

.0-54.0    N             

 

             PLATELET COUNT (test code = PLT) 259 x10 3/uL 150-400      N       

      

 

             MEAN PLATELET VOLUME (test code = MPV) 9.2 fL       7.0-9.0      H 

            

 

             MANUAL DIFF REQUIRED (test code = MDIFF) YES                       

              





COMMENTS: Daily while on HeparinWBC DIFFERENTIAL2019-10-04 03:57:00* 



             Test Item    Value        Reference Range Interpretation Comments

 

             ANISOCYTOSIS (test code = ANISO)                                   

      

 

             PLATELET ESTIMATE (test code = PLTEST)  THOUSAND    ADEQUATE       

            





COMMENTS: Daily while on HeparinBASIC METABOLIC PANEL2019-10-04 03:56:00* 



             Test Item    Value        Reference Range Interpretation Comments

 

             SODIUM (test code = NA) 142 mEq/L    134-147      N             

 

             POTASSIUM (test code = K) 3.4 mEq/L    3.4-5.0      N             

 

             CHLORIDE (test code = CL) 108 mEq/L    100-108      N             

 

             CARBON DIOXIDE (test code = CO2) 30 mEq/L     21-33                

      

 

             ANION GAP (test code = GAP) 7            0-20         N            

 

 

             GLUCOSE (test code = GLU) 93 mg/dL            N             

 

             BLOOD UREA NITROGEN (test code = BUN) 19 mg/dL     7-18         H  

           

 

             GLOMERULAR FILTRATION RATE (test code = GFR) 16.8         70-80    

    L            Units of measure = 

ml/min/1.73 m2

 

             CREATININE (test code = CREAT) 3.6 mg/dL    0.6-1.3      H         

    

 

             CALCIUM (test code = CA) 7.2 mg/dL    8.0-10.5     L             





[id number] 03:56:00* 



             Test Item    Value        Reference Range Interpretation Comments

 

             PHOSPHOROUS (test code = PHOS) 2.6 MG/DL    2.5-4.9                

    





MAGNESIUM2019-10-04 03:56:00* 



             Test Item    Value        Reference Range Interpretation Comments

 

             MAGNESIUM (test code = MAG) 2.10 mg/dL   1.8-2.4                   

 





TROPONIN--10-04 03:56:00* 



             Test Item    Value        Reference Range Interpretation Comments

 

             TROPONIN-I (test code = TROPI) 3.390 ng/mL  0.000-0.045  HH        

    Negative:            

<= 0.045 Positive:             >= 0.046 Correlation with serial results, other 
cardiac markers andclinical findings is necessary to determine the 
clinicalsignificance of this result. Results using different methodologies 
should not be comparedto one another as quantitative results may vary by method.





CALCIUM IONIZED2019-10-04 03:56:00* 



             Test Item    Value        Reference Range Interpretation Comments

 

             CALCIUM IONIZED (test code = TASHIA)  MMOL/L      1.12-1.32           

       





THROMBOPLASTIN TIME PARTIAL2019-10-04 03:53:00* 



             Test Item    Value        Reference Range Interpretation Comments

 

             THROMBOPLASTIN TIME PARTIAL (test code = PTT) 58.9 Seconds 25.0-39.

5    H                

Therapeutic Range: 50.4 - 88.3 Seconds        **** Effective 2019 ****





CBC W/AUTO DIFF2019-10-04 03:37:00* 



             Test Item    Value        Reference Range Interpretation Comments

 

             WHITE BLOOD CELL (test code = WBC) 9.81 x10 3/uL 4.5-11.0     N    

         

 

             RED BLOOD CELL (test code = RBC) 2.57 x10 6/uL 4.00-5.60    L      

       

 

             HEMOGLOBIN (test code = HGB) 7.6 g/dL     12.5-16.9    L           

  

 

             HEMATOCRIT (test code = HCT) 23.7 %       37.5-50.7    L           

  

 

             MEAN CELL VOLUME (test code = MCV) 92.2 fL      81.0-99.0    N     

        

 

             MEAN CELL HGB (test code = MCH) 29.6 pg      27.0-33.0    N        

     

 

             MEAN CELL HGB CONCETRATION (test code = MCHC) 32.1 g/dL    33.0-37.

0    L             

 

             RED CELL DISTRIBUTION WIDTH CV (test code = RDW) 16.1 %       11.5-

14.5    H             

 

             RED CELL DISTRIBUTION WIDTH SD (test code = RDW-SD) 53.6 fL      37

.0-54.0    N             

 

             PLATELET COUNT (test code = PLT) 259 x10 3/uL 150-400      N       

      

 

             MEAN PLATELET VOLUME (test code = MPV) 9.2 fL       7.0-9.0      H 

            

 

             NEUTROPHIL % (test code = NT%)  %           56.0-77.0              

    

 

             LYMPHOCYTE % (test code = LY%)  %           14.0-32.0              

    

 

             NEUTROPHIL # (test code = NT#)  x10 3/uL    2.0-7.6                

    

 

             LYMPHOCYTE # (test code = LY#)  x10 3/uL    1.0-3.8                

    

 

             MANUAL DIFF REQUIRED (test code = MDIFF)                           

              





COMMENTS: Daily while on HeparinTHROMBOPLASTIN TIME PARTIAL2019-10-03 19:47:00* 



             Test Item    Value        Reference Range Interpretation Comments

 

             THROMBOPLASTIN TIME PARTIAL (test code = PTT) 110.3 Seconds 25.0-39

.5    H                

Therapeutic Range: 50.4 - 88.3 Seconds        **** Effective 2019 ****





TROPONIN--10-03 18:30:00* 



             Test Item    Value        Reference Range Interpretation Comments

 

             TROPONIN-I (test code = TROPI) 3.180 ng/mL  0.000-0.045  HH        

    Negative:            

<= 0.045 Positive:             >= 0.046 Correlation with serial results, other 
cardiac markers andclinical findings is necessary to determine the 
clinicalsignificance of this result. Results using different methodologies 
should not be comparedto one another as quantitative results may vary by method.





PROCALCITONIN (PCT)2019-10-03 14:14:00* 



             Test Item    Value        Reference Range Interpretation Comments

 

             PROCALCITONIN (PCT) (test code = PROCAL) 2.41 ng/mL   0.00-0.05    

H             PROCALCITONIN 

(PCT) NORMAL RANGE (ADULT): <0.05 NG/ML. * a concentration <0.5 ng/mL represents
a low risk of  severe sepsis and/or septic shock.* a concentration >2 ng/mL 
represents a high risk of severe  sepsis and/or septic shock.Nevertheless, 
concentrations <0.5 ng/mL do not exclude aninfection, on account of localized 
infections (withoutsystemic signs) which can be associated with such 
lowconcentrations, or a systemic infection in its initialstages (< 6 hours). 
Furthermore, increased procalcitonincan occur without infection. PCT 
concentrations between 0.5and 2.0 ng/mL should be interpreted taking into 
account thepatient's history. It is recommended to retest PCT within6-24 hours 
if any concentrations <2 ng/mL are obtained.





CBC W/AUTO DIFF2019-10-03 14:12:00* 



             Test Item    Value        Reference Range Interpretation Comments

 

             WHITE BLOOD CELL (test code = WBC) 12.07 x10 3/uL 4.5-11.0     H   

          

 

             RED BLOOD CELL (test code = RBC) 2.53 x10 6/uL 4.00-5.60    L      

       

 

             HEMOGLOBIN (test code = HGB) 7.6 g/dL     12.5-16.9    L           

  

 

             HEMATOCRIT (test code = HCT) 22.8 %       37.5-50.7    L           

  

 

             MEAN CELL VOLUME (test code = MCV) 90.1 fL      81.0-99.0          

        

 

             MEAN CELL HGB (test code = MCH) 30.0 pg      27.0-33.0    N        

     

 

             MEAN CELL HGB CONCETRATION (test code = MCHC) 33.3 g/dL    33.0-37.

0    N             

 

             RED CELL DISTRIBUTION WIDTH CV (test code = RDW) 15.5 %       11.5-

14.5    H             

 

             RED CELL DISTRIBUTION WIDTH SD (test code = RDW-SD) 51.0 fL      37

.0-54.0    N             

 

             PLATELET COUNT (test code = PLT) 259 x10 3/uL 150-400      N       

      

 

             MEAN PLATELET VOLUME (test code = MPV) 9.1 fL       7.0-9.0      H 

            

 

             MANUAL DIFF REQUIRED (test code = MDIFF) YES                       

              





WBC DIFFERENTIAL2019-10-03 14:12:00* 



             Test Item    Value        Reference Range Interpretation Comments

 

             SEGMENTED NEUTROPHILS (test code = SEG) 75.2 %       37-69        H

             

 

             BAND NEUTROPHIL (test code = BAND) 0.9 %        0.0-10.0     N     

        

 

             LYMPHOCYTE (test code = LYMPH) 13.8 %       23-55        L         

    

 

             MONOCYTE (test code = MON) 6.4 %        0-10         N             

 

             BASOPHIL (test code = BASO) 0.9 %        0.0-2.0      N            

 

 

             METAMYELOCYTE (test code = META) 1.9 %        0.0-0.0      H       

      

 

             MYELOCYTE (test code = MYELO) 0.9 %        0.0-0.0      H          

   

 

             POLYCHROMASIA (test code = POLC) SLIGHT                            

      

 

             POIKILOCYTOSIS (test code = POIK) SLIGHT                           

      FEW ELLIPTOCYTES SEEN 

 

             ANISOCYTOSIS (test code = ANISO) 1+                                

      

 

             MICROCYTOSIS (test code = MICR) FEW                                

     

 

             PLATELET ESTIMATE (test code = PLTEST) Adequate THOUSAND ADEQUATE  

                 

 

             PLATELET MORPHOLOGY (test code = PLTMORPH) LARGE PLATELETS         

                  FEW LARGE PLTS 

SEEN 





CBC W/AUTO DIFF2019-10-03 14:10:00* 



             Test Item    Value        Reference Range Interpretation Comments

 

             WHITE BLOOD CELL (test code = WBC) 12.07 x10 3/uL 4.5-11.0     H   

          

 

             RED BLOOD CELL (test code = RBC) 2.53 x10 6/uL 4.00-5.60    L      

       

 

             HEMOGLOBIN (test code = HGB) 7.6 g/dL     12.5-16.9    L           

  

 

             HEMATOCRIT (test code = HCT) 22.8 %       37.5-50.7    L           

  

 

             MEAN CELL VOLUME (test code = MCV) 90.1 fL      81.0-99.0          

        

 

             MEAN CELL HGB (test code = MCH) 30.0 pg      27.0-33.0    N        

     

 

             MEAN CELL HGB CONCETRATION (test code = MCHC) 33.3 g/dL    33.0-37.

0    N             

 

             RED CELL DISTRIBUTION WIDTH CV (test code = RDW) 15.5 %       11.5-

14.5    H             

 

             RED CELL DISTRIBUTION WIDTH SD (test code = RDW-SD) 51.0 fL      37

.0-54.0    N             

 

             PLATELET COUNT (test code = PLT) 259 x10 3/uL 150-400      N       

      

 

             MEAN PLATELET VOLUME (test code = MPV) 9.1 fL       7.0-9.0      H 

            

 

             MANUAL DIFF REQUIRED (test code = MDIFF) YES                       

              





WBC DIFFERENTIAL2019-10-03 14:10:00* 



             Test Item    Value        Reference Range Interpretation Comments

 

             ANISOCYTOSIS (test code = ANISO)                                   

      

 

             PLATELET ESTIMATE (test code = PLTEST)  THOUSAND    ADEQUATE       

            





CBC W/AUTO DIFF2019-10-03 14:10:00* 



             Test Item    Value        Reference Range Interpretation Comments

 

             WHITE BLOOD CELL (test code = WBC) 12.07 x10 3/uL 4.5-11.0     H   

          

 

             RED BLOOD CELL (test code = RBC) 2.53 x10 6/uL 4.00-5.60    L      

       

 

             HEMOGLOBIN (test code = HGB) 7.6 g/dL     12.5-16.9    L           

  

 

             HEMATOCRIT (test code = HCT) 22.8 %       37.5-50.7    L           

  

 

             MEAN CELL VOLUME (test code = MCV) 90.1 fL      81.0-99.0          

        

 

             MEAN CELL HGB (test code = MCH) 30.0 pg      27.0-33.0    N        

     

 

             MEAN CELL HGB CONCETRATION (test code = MCHC) 33.3 g/dL    33.0-37.

0    N             

 

             RED CELL DISTRIBUTION WIDTH CV (test code = RDW) 15.5 %       11.5-

14.5    H             

 

             RED CELL DISTRIBUTION WIDTH SD (test code = RDW-SD) 51.0 fL      37

.0-54.0    N             

 

             PLATELET COUNT (test code = PLT) 259 x10 3/uL 150-400      N       

      

 

             MEAN PLATELET VOLUME (test code = MPV) 9.1 fL       7.0-9.0      H 

            

 

             MANUAL DIFF REQUIRED (test code = MDIFF) YES                       

              





WBC DIFFERENTIAL2019-10-03 14:10:00* 



             Test Item    Value        Reference Range Interpretation Comments

 

             ANISOCYTOSIS (test code = ANISO)                                   

      

 

             PLATELET ESTIMATE (test code = PLTEST)  THOUSAND    ADEQUATE       

            





CBC W/AUTO DIFF2019-10-03 13:51:00* 



             Test Item    Value        Reference Range Interpretation Comments

 

             WHITE BLOOD CELL (test code = WBC) 12.07 x10 3/uL 4.5-11.0     H   

          

 

             RED BLOOD CELL (test code = RBC) 2.53 x10 6/uL 4.00-5.60    L      

       

 

             HEMOGLOBIN (test code = HGB) 7.6 g/dL     12.5-16.9    L           

  

 

             HEMATOCRIT (test code = HCT) 22.8 %       37.5-50.7    L           

  

 

             MEAN CELL VOLUME (test code = MCV) 90.1 fL      81.0-99.0          

        

 

             MEAN CELL HGB (test code = MCH) 30.0 pg      27.0-33.0    N        

     

 

             MEAN CELL HGB CONCETRATION (test code = MCHC) 33.3 g/dL    33.0-37.

0    N             

 

             RED CELL DISTRIBUTION WIDTH CV (test code = RDW) 15.5 %       11.5-

14.5    H             

 

             RED CELL DISTRIBUTION WIDTH SD (test code = RDW-SD) 51.0 fL      37

.0-54.0    N             

 

             PLATELET COUNT (test code = PLT) 259 x10 3/uL 150-400      N       

      

 

             MEAN PLATELET VOLUME (test code = MPV) 9.1 fL       7.0-9.0      H 

            

 

             NEUTROPHIL % (test code = NT%)  %           56.0-77.0              

    

 

             LYMPHOCYTE % (test code = LY%)  %           14.0-32.0              

    

 

             NEUTROPHIL # (test code = NT#)  x10 3/uL    2.0-7.6                

    

 

             LYMPHOCYTE # (test code = LY#)  x10 3/uL    1.0-3.8                

    

 

             MANUAL DIFF REQUIRED (test code = MDIFF)                           

              





C REACTIVE PROTEIN2019-10-03 12:00:00* 



             Test Item    Value        Reference Range Interpretation Comments

 

             C REACTIVE PROTEIN (test code = CRP) 37.3 MG/L    0.0-2.9      H   

          





ACUTE HEPATITIS PANEL2019-10-03 11:05:00* 



             Test Item    Value        Reference Range Interpretation Comments

 

             AB HEPATITIS A IGM (test code = HAVMAB) NON REACTIVE INDEX NON REAC

T.                 

 

             AG HEPATITIS B SURFACE (test code = HBSAG) NON REACTIVE INDEX NonRe

active                

 

             AB HEPATITIS B CORE IGM (test code = HBCMAB) NON REACTIVE INDEX NON

 REACT.                 

 

             AB HEPATITIS C (test code = HCVAB) NON REACTIVE INDEX NON REACT.   

              





COMMENTS: At start of hemodialysisAB HEPATITIS B SURFACE2019-10-03 11:05:00* 



             Test Item    Value        Reference Range Interpretation Comments

 

             AB HEPATITIS B SURFACE (test code = HBSAB)                         

                





COMMENTS: At start of hemodialysisACUTE HEPATITIS PANEL2019-10-03 10:40:00* 



             Test Item    Value        Reference Range Interpretation Comments

 

             AB HEPATITIS A IGM (test code = HAVMAB)  INDEX       NON REACT.    

             

 

             AG HEPATITIS B SURFACE (test code = HBSAG) NON REACTIVE INDEX NonRe

active                

 

             AB HEPATITIS B CORE IGM (test code = HBCMAB)  INDEX       NON REACT

.                 

 

             AB HEPATITIS C (test code = HCVAB)  INDEX       NON REACT.         

        





COMMENTS: At start of hemodialysisAB HEPATITIS B SURFACE2019-10-03 10:40:00* 



             Test Item    Value        Reference Range Interpretation Comments

 

             AB HEPATITIS B SURFACE (test code = HBSAB)                         

                





COMMENTS: At start of hemodialysisVANCOMYCIN2019-10-03 08:18:00* 



             Test Item    Value        Reference Range Interpretation Comments

 

             VANCOMYCIN (test code = VANCO) 8.6 mcg/mL                          

    





COMMENTS: DRAW RANDOM LEVEL NOWCOMPREHENSIVE METABOLIC PANEL2019-10-03 07:34:00
  * 



             Test Item    Value        Reference Range Interpretation Comments

 

             SODIUM (test code = NA) 141 mEq/L    134-147      N             

 

             POTASSIUM (test code = K) 3.8 mEq/L    3.4-5.0      N             

 

             CHLORIDE (test code = CL) 109 mEq/L    100-108      H             

 

             CARBON DIOXIDE (test code = CO2) 21 mEq/L     21-33        N       

      

 

             ANION GAP (test code = GAP) 15           0-20         N            

 

 

             GLUCOSE (test code = GLU) 89 mg/dL                          

 

             BLOOD UREA NITROGEN (test code = BUN) 42 mg/dL     7-18         H  

           

 

             GLOMERULAR FILTRATION RATE (test code = GFR) 8.7          70-80    

    L            Units of measure = 

ml/min/1.73 m2

 

             CREATININE (test code = CREAT) 6.4 mg/dL    0.6-1.3      H         

    

 

             TOTAL PROTEIN (test code = PROT) 4.7 g/dL     6.4-8.2      L       

      

 

             ALBUMIN (test code = ALB) 1.80 g/dL    3.4-5.0      L             

 

             CALCIUM (test code = CA) 7.1 mg/dL    8.0-10.5     L             

 

             BILIRUBIN TOTAL (test code = BILT) 0.3 MG/DL    <1.5         N     

        

 

             SGOT/AST (test code = AST) 30 IUnit/L   15-37        N             

 

             SGPT/ALT (test code = ALT) 24 IUnit/L   15-65        N             

 

             ALKALINE PHOSPHATASE TOTAL (test code = ALKP) 87 IUnit/L     

     N             





COMMENTS: Fasting in AMLIPID PROFILE (CORONARY RISK)2019-10-03 07:34:00* 



             Test Item    Value        Reference Range Interpretation Comments

 

             TRIGLYCERIDES (test code = TRIG) 198 mg/dL           H       

      

 

             CHOLESTEROL (test code = CHOL) 143 mg/dL    <200                   

    

 

             CHOLESTEROL/HDL RATIO (test code = CHOLHDL) 5.72 RATIO   3.43-4.97 

   H            RISK 

ASSOCIATED WITH CHOL/HDL RATIOS:   RISK            MALE       FEMALE1/2 AVERAGE 
      3.43        3.27AVERAGE            4.97        4.442X AVERAGE         9.55
       7.053X AVERAGE         23.39      11.04 NOTE THAT THE REFERENCE VALUE IS 
RELATEDTO RISK LEVELS AS RECOMMENDED BY THE NATL.HEART, LUNG, AND BLOOD INST.

 

             HDL CHOLESTEROL (test code = HDL) 25.0 mg/dL   32-72        L      

       

 

             LIPOPROTEIN LDL (test code = LDL) 96 mg/dL     0-100        N      

      <100     [id number]  NEAR

OPTIMAL/ABOVE [id number]  [id number]  HIGH>QP=276  VERY 
HIGH*Guidelines provided by the National Cholesterol EducationProgram Adult 
Treatment Panel III





COMMENTS: Fasting in AMHEPATIC FUNCTION PANEL2019-10-03 07:34:00* 



             Test Item    Value        Reference Range Interpretation Comments

 

             BILIRUBIN DIRECT (test code = BILD) 0.10 MG/DL   0.0-0.30     N    

         

 

             BILIRUBIN INDIRECT (test code = BILIND) 0.20 MG/DL                 

             





COMMENTS: Fasting in AMPHOSPHOROUS2019-10-03 07:34:00* 



             Test Item    Value        Reference Range Interpretation Comments

 

             PHOSPHOROUS (test code = PHOS) 6.7 MG/DL    2.5-4.9      H         

    





COMMENTS: Fasting in AMCREATINE KINASE (CK)2019-10-03 07:34:00* 



             Test Item    Value        Reference Range Interpretation Comments

 

             CREATINE KINASE (CK) (test code = CK) 131                 N  

          Result is in INTERNATIONAL 

UNITS/LITER





COMMENTS: Fasting in AMMAGNESIUM2019-10-03 07:34:00* 



             Test Item    Value        Reference Range Interpretation Comments

 

             MAGNESIUM (test code = MAG) 2.90 mg/dL   1.8-2.4      H            

 





COMMENTS: Fasting in AMTHYROID STIMULATING HORMONE2019-10-03 07:34:00* 



             Test Item    Value        Reference Range Interpretation Comments

 

             THYROID STIMULATING HORMONE (test code = TSH) 1.15         0.42-5.4

7    N            Results in german-

International Units/mL





COMMENTS: Fasting in AMTROPONIN--10-03 07:34:00* 



             Test Item    Value        Reference Range Interpretation Comments

 

             TROPONIN-I (test code = TROPI) 2.860 ng/mL  0.000-0.045  HH        

    Negative:            

<= 0.045 Positive:             >= 0.046 Correlation with serial results, other 
cardiac markers andclinical findings is necessary to determine the 
clinicalsignificance of this result. Results using different methodologies 
should not be comparedto one another as quantitative results may vary by method.





COMMENTS: Fasting in AMCALCIUM IONIZED2019-10-03 07:34:00* 



             Test Item    Value        Reference Range Interpretation Comments

 

             CALCIUM IONIZED (test code = TASHIA) 1.05 MMOL/L  1.12-1.32    L      

       





COMMENTS: Fasting in [id number]%2019-10-03 07:17:00* 



             Test Item    Value        Reference Range Interpretation Comments

 

             HGBA1C% (test code = HGBA1C%) 5.6 %A1C     4.8-6.0      N          

   





ARTERIAL BLOOD GAS2019-10-03 07:14:00* 



             Test Item    Value        Reference Range Interpretation Comments

 

             ARTERIAL BLOOD GAS PH (test code = PHA) 7.383        7.35-7.45    N

             

 

             ARTERIAL BLOOD GAS PCO2 (test code = PCO2A) 33.9 mmHg    35-45     

   L             

 

             ARTERIAL BLOOD GAS PO2 (test code = PO2A) 166 mmHg           

 H             

 

             BICARBONATE TOTAL HCO3 (test code = HCO3) 20.2 mmol/L  22.0-26.0   

 L             

 

             BASE EXCESS (test code = URIEL) -5.0 mmol/L  -4-4         L          

   

 

             ABG O2 SATURATION (test code = SATA) 99 %                N   

          

 

             FIO2 (test code = FIO2A) 35 %                                    

 

             ABG DELIVERY (test code = MATTY) Vent                               

     

 

             ABG VENT MODE (test code = MODEA) AC v con                         

       

 

             ABG VENT RESP RATE (test code = RRA) 16 /MIN                       

          

 

             ABG TIDAL VOLUME (test code = TVA) 550 ml                          

        

 

             ABG PEEP (test code = PEEPA) 5 cmH2O                               

 Performed by certified  at  

[facility]

 

             ABG TEMPERATURE (test code = TEMPA) 98.6 F                         

         

 

             ABG SITE (test code = SITEA) R Brach                               

  

 

             PREDICTED AA GRADIENT (test code = AP) 54                          

            

 

             PREDICTED PO2 (test code = OP) 155                                 

    

 

             a/A RATIO (test code = RATIO) 0.79                                 

   

 

             TCO2 ARTERIAL (test code = TCO2A) 21                               

       

 

             A-A GRADIENT (test code = AAGRADE) 43                              

        





THROMBOPLASTIN TIME PARTIAL2019-10-03 07:05:00* 



             Test Item    Value        Reference Range Interpretation Comments

 

             THROMBOPLASTIN TIME PARTIAL (test code = PTT) 57.6 Seconds 25.0-39.

5    H                

Therapeutic Range: 50.4 - 88.3 Seconds        **** Effective 2019 ****





COMPREHENSIVE METABOLIC PANEL2019-10-03 07:00:00* 



             Test Item    Value        Reference Range Interpretation Comments

 

             SODIUM (test code = NA) 141 mEq/L    134-147      N             

 

             POTASSIUM (test code = K) 3.8 mEq/L    3.4-5.0      N             

 

             CHLORIDE (test code = CL) 109 mEq/L    100-108      H             

 

             CARBON DIOXIDE (test code = CO2) 21 mEq/L     21-33        N       

      

 

             ANION GAP (test code = GAP) 15           0-20         N            

 

 

             GLUCOSE (test code = GLU) 89 mg/dL                          

 

             BLOOD UREA NITROGEN (test code = BUN) 42 mg/dL     7-18         H  

           

 

             GLOMERULAR FILTRATION RATE (test code = GFR) 8.7          70-80    

    L            Units of measure = 

ml/min/1.73 m2

 

             CREATININE (test code = CREAT) 6.4 mg/dL    0.6-1.3      H         

    

 

             TOTAL PROTEIN (test code = PROT) 4.7 g/dL     6.4-8.2      L       

      

 

             ALBUMIN (test code = ALB) 1.80 g/dL    3.4-5.0      L             

 

             CALCIUM (test code = CA) 7.1 mg/dL    8.0-10.5     L             

 

             BILIRUBIN TOTAL (test code = BILT) 0.3 MG/DL    <1.5         N     

        

 

             SGOT/AST (test code = AST) 30 IUnit/L   15-37        N             

 

             SGPT/ALT (test code = ALT) 24 IUnit/L   15-65        N             

 

             ALKALINE PHOSPHATASE TOTAL (test code = ALKP) 87 IUnit/L     

     N             





COMMENTS: Fasting in AMLIPID PROFILE (CORONARY RISK)2019-10-03 07:00:00* 



             Test Item    Value        Reference Range Interpretation Comments

 

             TRIGLYCERIDES (test code = TRIG) 198 mg/dL           H       

      

 

             CHOLESTEROL (test code = CHOL) 143 mg/dL    <200                   

    

 

             CHOLESTEROL/HDL RATIO (test code = CHOLHDL) 5.72 RATIO   3.43-4.97 

   H            RISK 

ASSOCIATED WITH CHOL/HDL RATIOS:   RISK            MALE       FEMALE1/2 AVERAGE 
      3.43        3.27AVERAGE            4.97        4.442X AVERAGE         9.55
       7.053X AVERAGE         23.39      11.04 NOTE THAT THE REFERENCE VALUE IS 
RELATEDTO RISK LEVELS AS RECOMMENDED BY THE NATL.HEART, LUNG, AND BLOOD INST.

 

             HDL CHOLESTEROL (test code = HDL) 25.0 mg/dL   32-72        L      

       

 

             LIPOPROTEIN LDL (test code = LDL) 96 mg/dL     0-100        N      

      <100     [id number]  NEAR

OPTIMAL/ABOVE [id number]  [id number]  HIGH>AE=181  VERY 
HIGH*Guidelines provided by the National Cholesterol EducationProgram Adult 
Treatment Panel III





COMMENTS: Fasting in AMHEPATIC FUNCTION PANEL2019-10-03 07:00:00* 



             Test Item    Value        Reference Range Interpretation Comments

 

             BILIRUBIN DIRECT (test code = BILD) 0.10 MG/DL   0.0-0.30     N    

         

 

             BILIRUBIN INDIRECT (test code = BILIND) 0.20 MG/DL                 

             





COMMENTS: Fasting in AMPHOSPHOROUS2019-10-03 07:00:00* 



             Test Item    Value        Reference Range Interpretation Comments

 

             PHOSPHOROUS (test code = PHOS) 6.7 MG/DL    2.5-4.9      H         

    





COMMENTS: Fasting in AMCREATINE KINASE (CK)2019-10-03 07:00:00* 



             Test Item    Value        Reference Range Interpretation Comments

 

             CREATINE KINASE (CK) (test code = CK) 131                 N  

          Result is in INTERNATIONAL 

UNITS/LITER





COMMENTS: Fasting in AMMAGNESIUM2019-10-03 07:00:00* 



             Test Item    Value        Reference Range Interpretation Comments

 

             MAGNESIUM (test code = MAG) 2.90 mg/dL   1.8-2.4      H            

 





COMMENTS: Fasting in AMTHYROID STIMULATING HORMONE2019-10-03 07:00:00* 



             Test Item    Value        Reference Range Interpretation Comments

 

             THYROID STIMULATING HORMONE (test code = TSH) 1.15         0.42-5.4

7    N            Results in german-

International Units/mL





COMMENTS: Fasting in AMTROPONIN--10-03 07:00:00* 



             Test Item    Value        Reference Range Interpretation Comments

 

             TROPONIN-I (test code = TROPI) 2.860 ng/mL  0.000-0.045  HH        

    Negative:            

<= 0.045 Positive:             >= 0.046 Correlation with serial results, other 
cardiac markers andclinical findings is necessary to determine the 
clinicalsignificance of this result. Results using different methodologies 
should not be comparedto one another as quantitative results may vary by method.





COMMENTS: [facility]* 



             Test Item    Value        Reference Range Interpretation Comments

 

             CALCIUM IONIZED (test code = TASHIA)  MMOL/L      1.12-1.32           

       





COMMENTS: [facility]2019-10-03 06:34:00* 



             Test Item    Value        Reference Range Interpretation Comments

 

             WHITE BLOOD CELL (test code = WBC) 12.05 x10 3/uL 4.5-11.0     H   

          

 

             RED BLOOD CELL (test code = RBC) 2.46 x10 6/uL 4.00-5.60    L      

       

 

             HEMOGLOBIN (test code = HGB) 7.3 g/dL     12.5-16.9    L           

  

 

             HEMATOCRIT (test code = HCT) 23.0 %       37.5-50.7    L           

  

 

             MEAN CELL VOLUME (test code = MCV) 93.5 fL      81.0-99.0    N     

        

 

             MEAN CELL HGB (test code = MCH) 29.7 pg      27.0-33.0    N        

     

 

             MEAN CELL HGB CONCETRATION (test code = MCHC) 31.7 g/dL    33.0-37.

0    L             

 

             RED CELL DISTRIBUTION WIDTH CV (test code = RDW) 15.7 %       11.5-

14.5    H             

 

             RED CELL DISTRIBUTION WIDTH SD (test code = RDW-SD) 53.7 fL      37

.0-54.0    N             

 

             PLATELET COUNT (test code = PLT) 263 x10 3/uL 150-400      N       

      

 

             MEAN PLATELET VOLUME (test code = MPV) 9.4 fL       7.0-9.0      H 

            

 

             NEUTROPHIL % (test code = NT%) 74.9 %       56.0-77.0    N         

    

 

             IMMATURE GRANULOCYTE % (test code = IG%) 5.1 %        0.0-2.0      

H             

 

             LYMPHOCYTE % (test code = LY%) 11.0 %       14.0-32.0    L         

    

 

             MONOCYTE % (test code = MO%) 8.4 %        4.8-9.0      N           

  

 

             EOSINOPHIL % (test code = EO%) 0.0 %        0.3-3.7      L         

    

 

             BASOPHIL % (test code = BA%) 0.6 %        0.0-2.0      N           

  

 

             NUCLEATED RBC % (test code = NRBC%) 0.0 %        0-0          N    

         

 

             NEUTROPHIL # (test code = NT#) 9.03 x10 3/uL 2.0-7.6      H        

     

 

             IMMATURE GRANULOCYTE # (test code = IG#) 0.62 x10 3/uL 0.00-0.03   

 H             

 

             LYMPHOCYTE # (test code = LY#) 1.32 x10 3/uL 1.0-3.8      N        

     

 

             MONOCYTE # (test code = MO#) 1.01 x10 3/uL 0.1-0.8      H          

   

 

             EOSINOPHIL # (test code = EO#) 0.00 x10 3/uL 0.0-0.2      N        

     

 

             BASOPHIL # (test code = BA#) 0.07 x10 3/uL 0.0-0.2      N          

   

 

             NUCLEATED RBC # (test code = NRBC#) 0.00 x10 3/uL 0.0-0.1      N   

          

 

             MANUAL DIFF REQUIRED (test code = MDIFF) NO                        

              





- XR CHEST 1 -10-03 05:55:00 FAX:         Yakelin Wood MD     [phone]
   [place]:   St: [facility] FAX: Jonny Lerner MD   [phone]   
------------------------------------------------------------------------------- 
Name:   TY PORTER                    [facility]                    :
1950  Age/S: 69/M           [address] [place] [address]         Unit #: 
[id number]      Loc: [address] [address]                 Phys: 
Yakelin Wood MD                                                    Acct: 
[id number] Dis Date:               Status: ADM IN                             
   PHONE #: [phone]     Exam Date:     10/03/2019     0538                 
 FAX #: [phone]     Reason: ett                                            
   EXAMS:                                               CPT CODE:      [id number]
XR CHEST 1 V                               77051                    Chest, 
single view dated 10/3/2019.               HISTORY: Respiratory failure.  
Bilateral pneumonia.               Comparison is made to a prior study dated 
10/2/2019.               An endotracheal tube is identified with the tip 
positioned       approximately 4.2 cm above the steve.  A right jugular 
hemodialysis       catheter is identified with the tip projecting near the caval
atrial       junction.  The nasogastric tube has been retracted with the sideho
le       now visible in the distal esophagus.  The heart is normal in size.     
  The cardiomediastinal shadow is stable.  The bilateral pulmonary       infilt
rates have shown little significant interval change.  No acute       pleural spa
ce abnormalities are detected.                 IMPRESSION:         1.  Retractio
n of the nasogastric tube with the sidehole now         positioned in the distal
esophagus.  The radiographic appearance of         the chest has otherwise shown
little significant change when compared         the prior study dated 10/2/2019.
                  SL: 131                ** Electronically Signed by CRAIG Stone **        **                on 10/03/2019 at 0555            
   **                      Reported and signed by: Elton Stone M.D.  
             CC: Yakelin Wood MD; Jonny Love MD                             
                                                                        Techn
ologist: Eunice Blankenship, RT(R); Alex Gudino RT(R)            Trnscrd Date/Thomas
e/By: 10/03/2019 (0555) : By: Les           Orig Print D/T: S: 10/03/2019 
(0558)                         PAGE  1                       Signed Report      
                        LACTIC ACID2019-10-03 05:50:00* 



             Test Item    Value        Reference Range Interpretation Comments

 

             LACTIC ACID (test code = LACT) 1.4 mmol/L   0.4-1.9      N         

    





GLUBED2019-10-02 23:57:00* 



             Test Item    Value        Reference Range Interpretation Comments

 

             GLUBED (test code = GLUBED) 96 MG/DL            N            

Performed by certified  at

 [facility]





HGBA1C%2019-10-02 23:21:00* 



             Test Item    Value        Reference Range Interpretation Comments

 

             HGBA1C% (test code = HGBA1C%) < 3.5 %A1C   4.8-6.0      L          

   





TROPONIN--10-02 23:16:00* 



             Test Item    Value        Reference Range Interpretation Comments

 

             TROPONIN-I (test code = TROPI) 2.590 ng/mL  0.000-0.045  HH        

    Negative:            

<= 0.045 Positive:             >= 0.046 Correlation with serial results, other 
cardiac markers andclinical findings is necessary to determine the 
clinicalsignificance of this result. Results using different methodologies 
should not be comparedto one another as quantitative results may vary by method.





CALCIUM IONIZED2019-10-02 23:08:00* 



             Test Item    Value        Reference Range Interpretation Comments

 

             CALCIUM IONIZED (test code = TASHIA) 1.04 MMOL/L  1.12-1.32    L      

       





THROMBOPLASTIN TIME PARTIAL2019-10-02 23:05:00* 



             Test Item    Value        Reference Range Interpretation Comments

 

             THROMBOPLASTIN TIME PARTIAL (test code = PTT) 57.1 Seconds 25.0-39.

5    H                

Therapeutic Range: 50.4 - 88.3 Seconds        **** Effective 2019 ****





COMMENTS: DRAW PTT 6 HOURS AFTER INITIATION OF HEPARINTROPONIN--10-02 
18:58:00* 



             Test Item    Value        Reference Range Interpretation Comments

 

             TROPONIN-I (test code = TROPI) 2.670 ng/mL  0.000-0.045  HH        

    Negative:            

<= 0.045 Positive:             >= 0.046 Correlation with serial results, other 
cardiac markers andclinical findings is necessary to determine the 
clinicalsignificance of this result. Results using different methodologies 
should not be comparedto one another as quantitative results may vary by method.





- DUP EXTRACRANIAL BIL2019-10-02 14:17:00  Name: TY PORTER                  
  [facility]                    : 1950 Age/S: 69  / M         [address] [place] [address] Blvd         Unit #: [id number]     Loc:               [place] [address]                 Phys: Yakelin Wood MD                                 
                  Acct: [id number]  Dis Date:               Status: ADM IN    
                             PHONE #: [phone]     Exam Date: 10/02/2019  
1400                     FAX #: [phone]      Reason: stroke                
                             EXAMS:                                             
 CPT CODE:      [id number] DUP EXTRACRANIAL KALEN                       [address]      
             Clinical Indication: Stroke; hypertension.       Comparison: None  
            TECHNIQUE:                Gray-scale, color Doppler and spectral 
Doppler of the carotid arteries       was performed.  Any reported ICA stenoses 
indirectly reference the       distal internal carotid diameter as the 
denominator for the stenosis       measurement, utilizing consensus panel 
criteria.                       FINDINGS:                RIGHT:                 
Mild plaque formation in the proximal internal       carotid.       ICA PSV     
          44 cm/sec        CCA PSV              51 cm/sec       ICA/CCA ratio   
     0.9       Vertebral flow is antegrade.       External carotid artery is 
patent.               LEFT:                    Mild plaque formation at the 
proximal       internal carotid.       ICA PSV                54 cm/sec        
CCA PSV              71 cm/sec       ICA/CCA ratio        0.8       Vertebral 
flow is antegrade.       External carotid artery is patent.                 
IMPRESSION:         RIGHT: ICA stenosis <50 % by velocity criteria.         
LEFT: ICA stenosis <50  % by velocity criteria.                                 
     Consensus panel Doppler US criteria for diagnosis of ICA stenosis:         
         Stenosis (%)          ICA PSV (cm/sec)          ICA/CCA ratio         
----------------------------------------------------------------------         
------         <50                            <125                         <2.0 
       50-69                        125-230                      2.0-4.0        
>70 but less than        >230                          >4.0            near 
occlusion         Near occlusion          High, low, or                Variable 
   PAGE  1                       Signed Report                    (CONTINUED)   
Name: TY PORTER                     [facility]                    : 
1950 Age/S: 69  / M         [address] [place] [address] Blvd         Unit #: 
[id number]     Loc:               [place] [address]                 Phys: 
Yakelin Wood MD                                                    Acct: 
[id number]  Dis Date:               Status: ADM IN                            
     PHONE #: [phone]     Exam Date: 10/02/2019  1400                     
FAX #: [phone]      Reason: stroke                                         
    EXAMS:                                               CPT CODE:      [id number]
[id number] DUP EXTRACRANIAL KALEN                       10951               <Continued>
                                           undetectable                         
             SL:  [id number]                    ** Electronically Signed by 
CRAIG Poole **            **            on 10/02/2019 at 1417            
**                      Reported and signed by: Beni Poole M.D.             
                   CC: Yakelin Wood MD; Jonny Love MD                       
                                                                              
Technologist: Aliyah Ahuja RDMS(OB)(AB)                            Trnscb Date
/Time: 10/02/2019 (7095) [id number]                       Orig Print D/T: S: 10/
2019 (7980)     Probe:                       PAGE  2                       Si
gned Report                               TROPONIN--10-02 13:26:00* 



             Test Item    Value        Reference Range Interpretation Comments

 

             TROPONIN-I (test code = TROPI) 2.320 ng/mL  0.000-0.045  HH        

    Negative:            

<= 0.045 Positive:             >= 0.046 Correlation with serial results, other 
cardiac markers andclinical findings is necessary to determine the 
clinicalsignificance of this result. Results using different methodologies 
should not be comparedto one another as quantitative results may vary by method.





AMMONIA2019-10-02 13:09:00* 



             Test Item    Value        Reference Range Interpretation Comments

 

             AMMONIA (test code = AMM) 15 umol/L    0-35         N             





LIPOPROTEIN LDL2019-10-02 09:08:00* 



             Test Item    Value        Reference Range Interpretation Comments

 

             LIPOPROTEIN LDL (test code = LDL) 123 mg/dL    0-100        H      

      <100     [id number]  

NEAR OPTIMAL/ABOVE [id number]  [id number]  HIGH>DS=935  VERY 
HIGH*Guidelines provided by the National Cholesterol EducationProgram Adult 
Treatment Panel III





TROPONIN--10-02 08:55:00* 



             Test Item    Value        Reference Range Interpretation Comments

 

             TROPONIN-I (test code = TROPI) 1.500 ng/mL  0.000-0.045  HH        

    Negative:            

<= 0.045 Positive:             >= 0.046 Correlation with serial results, other 
cardiac markers andclinical findings is necessary to determine the 
clinicalsignificance of this result. Results using different methodologies 
should not be comparedto one another as quantitative results may vary by method.





- XR CHEST 1 -10-02 08:18:00 FAX:         Yakelin Wood MD     [phone]
   [place]:   St: [facility] FAX: Jonny Lerner MD   [phone]   
------------------------------------------------------------------------------- 
Name:   TY PORTER                    [facility]                    :
1950  Age/S: 69/M           [address] [place] [address]         Unit #: 
[id number]      Loc: [address]       [place] [address]                 Phys: 
Yakelin Wood MD                                                    Acct: 
[id number] Dis Date:               Status: ADM IN                             
   PHONE #: [phone]     Exam Date:     10/02/2019     0535                 
 FAX #: [phone]     Reason: ett                                            
   EXAMS:                                               CPT CODE:      [id number]
XR CHEST 1 V                               [address]21                    CLINICAL 
HISTORY:ett               COMPARISON:2019 at 1248.               
Frontal film of the chest performed at 0514 on 2019       
demonstrates an endotracheal tube with its tip 3.8 cm above the level       of 
steve.  Nasogastric tube is in place.  IJ catheter tip is in       superior 
vena cava.  Monitor leads are noted.               Heart size is normal.  
Pulmonary opacities are present bilaterally,       more on the left side.  No si
gnificant interval change is noted since       previous examination.            
    IMPRESSION:         1.  Supporting lines and tubes are present.         2.  
No interval change in lung fields compared to previous         examination.     
    ** Electronically Signed by CRAIG Medina on 10/02/2019 at 0818 **       
              Reported and signed by: Solomon Medina M.D.                      CC:
Yakelin Wood MD; Jonny Love MD                                              
                                                       Technologist: Eunice Blankenship, RT(R); Alex Gudino RT(R)            Trnscrd Date/Time/By: 10/02/2019 
(818) : By: Randy           [facility] Print D/T: S: 10/02/2019 (821)           
             PAGE  1                       Signed Report                        
      HGB   HCT2019-10-02 06:22:00* 



             Test Item    Value        Reference Range Interpretation Comments

 

             HEMOGLOBIN (test code = HGB) 8.0 g/dL     12.5-16.9    L           

  

 

             HEMATOCRIT (test code = HCT) 24.9 %       37.5-50.7    L           

  





BASIC METABOLIC PANEL2019-10-02 05:57:00* 



             Test Item    Value        Reference Range Interpretation Comments

 

             SODIUM (test code = NA) 139 mEq/L    134-147      N             

 

             POTASSIUM (test code = K) 3.7 mEq/L    3.4-5.0      N             

 

             CHLORIDE (test code = CL) 104 mEq/L    100-108      N             

 

             CARBON DIOXIDE (test code = CO2) 24 mEq/L     21-33        N       

      

 

             ANION GAP (test code = GAP) 15           0-20         N            

 

 

             GLUCOSE (test code = GLU) 140 mg/dL           H             

 

             BLOOD UREA NITROGEN (test code = BUN) 31 mg/dL     7-18         H  

           

 

             GLOMERULAR FILTRATION RATE (test code = GFR) 8.2          70-80    

    L            Units of measure = 

ml/min/1.73 m2

 

             CREATININE (test code = CREAT) 6.7 mg/dL    0.6-1.3      H         

    

 

             CALCIUM (test code = CA) 7.7 mg/dL    8.0-10.5     L             





PHOSPHOROUS2019-10-02 05:57:00* 



             Test Item    Value        Reference Range Interpretation Comments

 

             PHOSPHOROUS (test code = PHOS) 5.2 MG/DL    2.5-4.9      H         

    





MAGNESIUM2019-10-02 05:57:00* 



             Test Item    Value        Reference Range Interpretation Comments

 

             MAGNESIUM (test code = MAG) 2.90 mg/dL   1.8-2.4      H            

 





CBC W/AUTO DIFF2019-10-02 05:37:00* 



             Test Item    Value        Reference Range Interpretation Comments

 

             WHITE BLOOD CELL (test code = WBC) 9.27 x10 3/uL 4.5-11.0     N    

         

 

             RED BLOOD CELL (test code = RBC) 2.40 x10 6/uL 4.00-5.60    L      

       

 

             HEMOGLOBIN (test code = HGB) 7.0 g/dL     12.5-16.9    L           

  

 

             HEMATOCRIT (test code = HCT) 22.4 %       37.5-50.7    L           

  

 

             MEAN CELL VOLUME (test code = MCV) 93.3 fL      81.0-99.0    N     

        

 

             MEAN CELL HGB (test code = MCH) 29.2 pg      27.0-33.0    N        

     

 

             MEAN CELL HGB CONCETRATION (test code = MCHC) 31.3 g/dL    33.0-37.

0    L             

 

             RED CELL DISTRIBUTION WIDTH CV (test code = RDW) 15.2 %       11.5-

14.5    H             

 

             RED CELL DISTRIBUTION WIDTH SD (test code = RDW-SD) 52.3 fL      37

.0-54.0    N             

 

             PLATELET COUNT (test code = PLT) 274 x10 3/uL 150-400      N       

      

 

             MEAN PLATELET VOLUME (test code = MPV) 9.4 fL       7.0-9.0      H 

            

 

             NEUTROPHIL % (test code = NT%) 84.8 %       56.0-77.0    H         

    

 

             IMMATURE GRANULOCYTE % (test code = IG%) 4.0 %        0.0-2.0      

H             

 

             LYMPHOCYTE % (test code = LY%) 7.3 %        14.0-32.0    L         

    

 

             MONOCYTE % (test code = MO%) 3.7 %        4.8-9.0      L           

  

 

             EOSINOPHIL % (test code = EO%) 0.0 %        0.3-3.7      L         

    

 

             BASOPHIL % (test code = BA%) 0.2 %        0.0-2.0      N           

  

 

             NUCLEATED RBC % (test code = NRBC%) 0.0 %        0-0          N    

         

 

             NEUTROPHIL # (test code = NT#) 7.86 x10 3/uL 2.0-7.6      H        

     

 

             IMMATURE GRANULOCYTE # (test code = IG#) 0.37 x10 3/uL 0.00-0.03   

 H             

 

             LYMPHOCYTE # (test code = LY#) 0.68 x10 3/uL 1.0-3.8      L        

     

 

             MONOCYTE # (test code = MO#) 0.34 x10 3/uL 0.1-0.8      N          

   

 

             EOSINOPHIL # (test code = EO#) 0.00 x10 3/uL 0.0-0.2      N        

     

 

             BASOPHIL # (test code = BA#) 0.02 x10 3/uL 0.0-0.2      N          

   

 

             NUCLEATED RBC # (test code = NRBC#) 0.00 x10 3/uL 0.0-0.1      N   

          

 

             MANUAL DIFF REQUIRED (test code = MDIFF) NO                        

              





BASIC METABOLIC PANEL9-10- 01:05:00* 



             Test Item    Value        Reference Range Interpretation Comments

 

             SODIUM (test code = NA) 137 mEq/L    134-147      N             

 

             POTASSIUM (test code = K) 3.6 mEq/L    3.4-5.0      N             

 

             CHLORIDE (test code = CL) 104 mEq/L    100-108      N             

 

             CARBON DIOXIDE (test code = CO2) 23 mEq/L     21-33        N       

      

 

             ANION GAP (test code = GAP) 14           0-20         N            

 

 

             GLUCOSE (test code = GLU) 154 mg/dL           H             

 

             BLOOD UREA NITROGEN (test code = BUN) 32 mg/dL     7-18         H  

           

 

             GLOMERULAR FILTRATION RATE (test code = GFR) 8.4          70-80    

    L            Units of measure = 

ml/min/1.73 m2

 

             CREATININE (test code = CREAT) 6.6 mg/dL    0.6-1.3      H         

    

 

             CALCIUM (test code = CA) 7.4 mg/dL    8.0-10.5     L             





BASIC METABOLIC PANEL2019-10-02 01:03:00* 



             Test Item    Value        Reference Range Interpretation Comments

 

             SODIUM (test code = NA) 137 mEq/L    134-147      N             

 

             POTASSIUM (test code = K) 3.6 mEq/L    3.4-5.0      N             

 

             CHLORIDE (test code = CL) 104 mEq/L    100-108      N             

 

             CARBON DIOXIDE (test code = CO2) 23 mEq/L     21-33        N       

      

 

             ANION GAP (test code = GAP) 14           0-20         N            

 

 

             GLUCOSE (test code = GLU) 154 mg/dL           H             

 

             BLOOD UREA NITROGEN (test code = BUN) 32 mg/dL     7-18         H  

           

 

             GLOMERULAR FILTRATION RATE (test code = GFR)              70-80    

                  

 

             CREATININE (test code = CREAT)  mg/dL       0.6-1.3                

    

 

             CALCIUM (test code = CA) 7.4 mg/dL    8.0-10.5     L             





GLUBED2019-10-01 23:14:00* 



             Test Item    Value        Reference Range Interpretation Comments

 

             GLUBED (test code = GLUBED) 138 MG/DL           H            

Performed by certified  

at  [facility]





- MRI BRAIN W/O CONT2019-10-01 22:38:00 FAX: Kimmy Duenas   
[phone]    [place]:   St: ADM FAX: Jonny Lerner MD   
[phone]   ----------------------------------------
---------------------------------------  Name:   TY PORTER                  
 [place]                    : 1950  Age/S: 69/M           [address] [place] [address]         Unit #: [id number]      Loc: G.M301       RYAN Rodrigues
X [address]                 Phys: Kimmy Tomlinson MD                            
                 Acct: [id number] Dis Date:               Status: ADM IN      
                          PHONE #: [phone]     Exam Date:     10/01/2019   
 2132                   FAX #: [phone]     Reason: left hp, ?cortical oseas 
thrombosis, ?SAH            EXAMS:                                              
CPT CODE:      [id number] MRI BRAIN W/O CONT                         25761       
            Study:  - MRV HEAD WO CONT,  - MRI BRAIN W/O CONT 10/1/2019 8:02 PM 
             Patient Name: TY PORTER MR: [id number]       : 1950; 
Age: 69 years  y/o Male       Ordering Physician: Kimmy Vargas MD        
      Clinical Indication: left hp, ?cortical oseas thrombosis, ?SAH              
Comparison: None               TECHNIQUE:        Multiplanar noncontrast MRI of 
the brain was performed on a 1.5 Delisa       magnet.        Full and complete 
magnetic resonance venography of the head is       performed with SPGR time of 
flight images and maximum intensity       projection images. Volume-rendered p
ost-processed images were       obtained.  Images were acquired in the coronal a
nd sagittal planes.               FINDINGS:                BRAIN PARENCHYMA:    
          General:        Multiple scattered areas of restricted diffusion are 
seen within the       right frontal and parietal lobes along the superior convex
ity       involving the cortex and subcortical white matter.  The superior      
frontal gyrus, precentral gyrus, and paracentral lobule are involved.        Th
ere is associated FLAIR hyperintensity in this region.       Linear FLAIR hyperi
ntensities seen in the right VLAD MCA watershed       distribution (series 6 imag
e 6)       Confluent and scattered FLAIR hyperintensities in the supratentorial 
     white matter likely represent chronic small vessel ischemic changes.       
Foci susceptibility in the justin.  Old lacunar infarct in the right       right 
justin.       Focal area susceptibility in the right frontoparietal region       c
orresponding to a superficial cortical vein (series 9 image 4).               Ve
ntricles: The lateral ventricles, third ventricle, fourth ventricle,       and b
asilar cisterns are appropriate for degree of atrophy present.               Mid
line Structures:       The pituitary gland, corpus callosum, and remaining midli
ne structures       are normal.             PAGE  1                       Signed
Report                    (CONTINUED)  FAX: Kimmy Duenas   [phone]
[phone]    [place]:   St: ADM FAX: Jonny Lerner MD   [phone]   -
------------------------------------------------------------------------------  
Name:   TY PORTER                    [facility]                    :
1950  Age/S: 69/M           [address] [place] [address]         Unit #: 
[id number]      Loc: [address] [address]                 Phys: Barron hannon,Kimmy LEZAMA                                              Acct: [id number] Dis 
Date:               Status: ADM IN                                 PHONE #: [phone]
[phone]     Exam Date:     10/01/2019     2132                   FAX #: [phone]
[phone]     Reason: left hp, ?cortical oseas thrombosis, ?SAH            EXAMS:     
                                         CPT CODE:      [id number] MRI BRAIN W/O 
CONT                         74971               <Continued>        VASCULATURE:
      Arterial: The major intracranial arterial flow voids are present.       
Venous: The dural venous sinus flow voids are grossly normal.               
PARANASAL SINUSES: The visualized portions of the paranasal sinuses       are 
clear.               MASTOIDS:       Clear.               SOFT TISSUES AND 
ORBITS:       The visualized portions are normal.               MRV:       
Superior sagittal sinus: Normal flow without filling defect.       Inferior s
agittal sinus: Not well seen, favored to be congenitally       small without foc
al filling defect.       Internal cerebral veins:Normal flow without filling def
ect.       Vein of Garrett: Normal flow without filling defect.       Straight sin
us:Normal flow without filling defect.       Torcular Herophili:Normal flow with
out filling defect.       Transverse sinuses:Normal flow without filling defect.
      Sigmoid sinuses: No filling defect identified.  The left sphenoid       s
inus is asymmetrically small                 IMPRESSION:                    MRI 
brain:                   1.  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
al and parietal         lobes.  No acute intracranial hemorrhage identified.    
    2.  Chronic ischemic changes in the right MCA-VLAD watershed territory.      
  3.  Chronic microhemorrhages in the bilateral justin and old lacunar         i
nfarct in the right jsutin.         4.  Susceptibility effect in a right parietal 
superficial cortical         vein is nonspecific.  This vein appears normal on t
he comparison MRV,         and is therefore the finding is favored to represent 
slow flow.  CTA         head and neck can be performed for further evaluation.  
      5.  Moderate generalized volume loss and chronic small vessel ischemic    
    changes in the supratentorial white matter.                   MRV brain:    
    1.  No venous thrombosis identified.     PAGE  2                       Si
gned Report                    (CONTINUED)  FAX: Kimmy Duenas   [phone]
[phone]    [place]:   St: [facility] FAX: Jonny Lerner MD   [phone]
  -----------------------------------------------------------------------------
--  Name:   TY PORTER                    [facility]                    
: 1950  Age/S: 69/M           [address] [place] [address] Blvd         Unit #: [id number]
[id number]      Loc: [address]       [place] [address]                 Phys: Kimmy Tomlinson MD                                              Acct: [id number] 
Dis Date:               Status: ADM IN                                 PHONE #: 
[phone]     Exam Date:     10/01/2019     2132                   FAX #: [phone]
[phone]     Reason: left hp, ?cortical oseas thrombosis, ?SAH            EXAMS: 
                                             CPT CODE:      [id number] MRI BRAIN 
W/O CONT                         14687               <Continued>                
   SL:  AP-H          ** Electronically Signed by CRAIG Ortez on 
10/01/2019 at 2238 **                      Reported and signed by: Ty Ortez M.D.                                    CC: Kimmy Vargas MD; Jonny Love MD                                                                       
                        Technologist: RT Rolando(R)(CT)                  
            Trnscdoris Date/Time/By: 10/01/2019 (223) : By: [email or web address]          
Orig Print D/T: S: 10/01/2019 (2247)                         PAGE  3            
          Signed Report                               - MRV HEAD WO CONT
2019-10-01 22:38:00 FAX: Kimmy Duenas   [phone]    [place]: 
 St: [facility] FAX: Jonny Lerner MD   [phone]   
------------------------------------------------------------------------------- 
Name:   TY PORTER                    [place]                    :
1950  Age/S: 69/M           [address] [place] [address] Bl         Unit #: 
[id number]      Loc: [address] [address]                 Phys: Kimmy Tomlinson MD                                              Acct: [id number] 
Dis Date:               Status: ADM IN                                 PHONE #: 
[phone]     Exam Date:     10/01/2019     2132                   FAX #: 
[phone]     Reason: left hp, ?cortical oseas thrombosis, ?SAH            
EXAMS:                                               CPT CODE:      [id number] 
MRV HEAD WO CONT                           [address]                    Study:  - 
MRV HEAD WO CONT,  - MRI BRAIN W/O CONT 10/1/2019 8:02 PM               Patient 
Name: TY PORTER MR: [id number]       : 1950; Age: 69 years  y/o Male
      Ordering Physician: Kimmy Vargas MD               Clinical 
Indication: left hp, ?cortical oseas thrombosis, ?SAH               Comparison: 
None               TECHNIQUE:        Multiplanar noncontrast MRI of the brain 
was performed on a 1.5 Delisa       magnet.        Full and complete magnetic 
resonance venography of the head is       performed with SPGR time of flight 
images and maximum intensity       projection images. Volume-rendered post-
processed images were       obtained.  Images were acquired in the coronal and 
sagittal planes.               FINDINGS:                BRAIN PARENCHYMA:       
       General:        Multiple scattered areas of restricted diffusion are seen
within the       right frontal and parietal lobes along the superior convexity  
    involving the cortex and subcortical white matter.  The superior       
frontal gyrus, precentral gyrus, and paracentral lobule are involved.        Th
ere is associated FLAIR hyperintensity in this region.       Linear FLAIR hyperi
ntensities seen in the right VLAD MCA watershed       distribution (series 6 imag
e 6)       Confluent and scattered FLAIR hyperintensities in the supratentorial 
     white matter likely represent chronic small vessel ischemic changes.       
Foci susceptibility in the justin.  Old lacunar infarct in the right       right 
justin.       Focal area susceptibility in the right frontoparietal region       c
orresponding to a superficial cortical vein (series 9 image 4).               Ve
ntricles: The lateral ventricles, third ventricle, fourth ventricle,       and b
asilar cisterns are appropriate for degree of atrophy present.               Mid
line Structures:       The pituitary gland, corpus callosum, and remaining midli
ne structures       are normal.             PAGE  1                       Signed
Report                    (CONTINUED)  FAX: Kimmy Duenas   [phone]
[phone]    [place]:   St: ADM FAX: Jonny Lerner MD   [phone]   -
------------------------------------------------------------------------------  
Name:   TY PORTER                    [facility]                    :
1950  Age/S: 69/M           [address] [place] [address]         Unit #: 
[id number]      Loc: [address] [address]                 Phys: Kimmy Chaudhry MD                                              Acct: [id number] Dis 
Date:               Status: ADM IN                                 PHONE #: [phone]
[phone]     Exam Date:     10/01/2019     2132                   FAX #: [phone]
[phone]     Reason: left hp, ?cortical oseas thrombosis, ?SAH            EXAMS:     
                                         CPT CODE:      [id number] MRV HEAD WO C
ONT                           65668               <Continued>        
VASCULATURE:       Arterial: The major intracranial arterial flow voids are 
present.       Venous: The dural venous sinus flow voids are grossly normal.    
          PARANASAL SINUSES: The visualized portions of the paranasal sinuses   
   are clear.               MASTOIDS:       Clear.               SOFT TISSUES 
AND ORBITS:       The visualized portions are normal.               MRV:       
Superior sagittal sinus: Normal flow without filling defect.       Inferior s
agittal sinus: Not well seen, favored to be congenitally       small without foc
al filling defect.       Internal cerebral veins:Normal flow without filling def
ect.       Vein of Garrett: Normal flow without filling defect.       Straight sin
us:Normal flow without filling defect.       Torcular Herophili:Normal flow with
out filling defect.       Transverse sinuses:Normal flow without filling defect.
      Sigmoid sinuses: No filling defect identified.  The left sphenoid       s
inus is asymmetrically small                 IMPRESSION:                    MRI 
brain:                   1.  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
al and parietal         lobes.  No acute intracranial hemorrhage identified.    
    2.  Chronic ischemic changes in the right MCA-VLAD watershed territory.      
  3.  Chronic microhemorrhages in the bilateral justin and old lacunar         i
nfarct in the right justin.         4.  Susceptibility effect in a right parietal 
superficial cortical         vein is nonspecific.  This vein appears normal on t
he comparison MRV,         and is therefore the finding is favored to represent 
slow flow.  CTA         head and neck can be performed for further evaluation.  
      5.  Moderate generalized volume loss and chronic small vessel ischemic    
    changes in the supratentorial white matter.                   MRV brain:    
    1.  No venous thrombosis identified.     PAGE  2                       Si
gned Report                    (CONTINUED)  FAX: Kimmy Duenas   [phone]
[phone]    [place]:   St: ADM FAX: Jonny Lerner MD   [phone]
  -----------------------------------------------------------------------------
--  Name:   TY PORTER                    [facility]                    
: 1950  Age/S: 69/M           [address] [place] [address]         Unit #: G
[id number]      Loc: [address]       [place] [address]                 Phys: Kimmy Tomlinson MD                                              Acct: [id number] 
Dis Date:               Status: ADM IN                                 PHONE #: 
[phone]     Exam Date:     10/01/2019     2132                   FAX #: [phone]
[phone]     Reason: left hp, ?cortical oseas thrombosis, ?SAH            EXAMS: 
                                             CPT CODE:      [id number] MRV HEAD 
WO CONT                           86014               <Continued>               
    SL:  AP-H          ** Electronically Signed by CRAIG Ortez on 
10/01/2019 at 2298 **                      Reported and signed by: Ty Ortez M.D.                                    CC: Kimmy Vargas MD; Jonny Love MD                                                                       
                        Technologist: Rodrigue Nolan, RT(R)(CT)                  
            Trnscrd Date/Time/By: 10/01/2019 (2238) : By: [email or web address]          
Orig Print D/T: S: 10/01/2019 (5006)                         PAGE  3            
          Signed Report                               THROMBOPLASTIN TIME 
PARTIAL2019-10-01 20:15:00* 



             Test Item    Value        Reference Range Interpretation Comments

 

             THROMBOPLASTIN TIME PARTIAL (test code = PTT) 27.2 Seconds 25.0-39.

5    N                

Therapeutic Range: 50.4 - 88.3 Seconds        **** Effective 2019 ****





- CT HEAD/BRAIN W/O CONT2019-10-01 19:50:00  Name: TY PORTER                
    [place]                    : 1950 Age/S: 69  / M         
[address] [place] [address] Blvd         Unit #: [id number]     Loc:               
[place] [address]                 Phys: Yakelin Wood MD                        
                           Acct: [id number]  Dis Date:               Status: 
ADM IN                                  PHONE #: [phone]     Exam Date: 
10/01/2019  1928                     FAX #: [phone]      Reason: LEFT SIDE 
WEAKNESS                                  EXAMS:                                
              CPT CODE:      [id number] CT HEAD/BRAIN W/O CONT                   
 [address]                    STUDY:  - CT HEAD/BRAIN W/O CONT 10/1/2019 7:05 PM    
  Ordering Physician: Yakelin Wood MD               Patient Name: TY PORTER
MR: [id number]       : 1950; Age: 69 years  y/o Male               
Clinical Indication: LEFT SIDE WEAKNESS               Comparison: 2019 CT head               TECHNIQUE: Multiple contiguous transaxial noncontrast
CT images were       obtained through the head. Coronal and sagittal reformatted
images       were prepared.               DOSE:  CT imaging performed at this 
location utilizes radiation dose       optimization technique which includes one
or more of the followin)       Automated exposure control; 2) Adjustment of 
the mA and/or kV       according to patient's size; 3) Use of iterative 
reconstruction       techniques.  DLP (mGy-cm): 964               FINDINGS:     
         BRAIN PARENCHYMA:       The brain volume is appropriate for age.       
Increased attenuation with effacement of sulci in the right frontal,       
parietal, and temporal lobes, most prominent along the right superior       
frontoparietal region.  VENTRICLES: The lateral ventricles, third       
ventricle, fourth ventricle, and basilar cisterns are appropriate for       degr
ee of atrophy present.               PARANASAL SINUSES: The visualized portions 
of the paranasal sinuses       are clear.               MASTOIDS: Clear.        
      ORBITS: The visualized portions of the orbits are normal.               S
OFT TISSUES: No significant abnormality.               SKULL: No acute fracture 
or suspicious osseous lesion.                         IMPRESSION:               
          PAGE  1                       Signed Report                    (KELLI
NUED)   Name: TY PORTER                     [facility] [place]                 
  : 1950 Age/S: 69  / M         [address] [place] [address] Blvd         Unit #:
[id number]     Loc:               [place] [address]                 Phys: Yakelin Randolph MD                                                    Acct: [id number]
[id number]  Dis Date:               Status: ADM IN                                  PHON
E #: [phone]     Exam Date: 10/01/2019  1928                     FAX #: [phone]
[phone]      Reason: LEFT SIDE WEAKNESS                                  EXAMS
:                                               CPT CODE:      [id number] CT HEAD
/BRAIN W/O CONT                     [address]               <Continued>          1. 
New nonspecific hyperdensity in the right cerebral hemisphere,         which may
represent cortical venous thrombosis.  Specifically, there         is 
parenchymal hyperdensity primarily in the superior frontoparietal         region
and hyperdensity within cortical veins in this region.  Subtle         sub
arachnoid hemorrhage is not excluded.         2.  MRI brain with and without may
be performed for further evaluation                   Findings discussed with BIANKA Farooq at 7:40 PM 10/1/2019 by         telephone.          SL:  YUMIKO  
       ** Electronically Signed by CRAIG Ortez on 10/01/2019 at 1950 **   
                  Reported and signed by: Ty Ortez M.D.                    
       CC: Yakelin Wood MD; Jonny Love MD                                   
                                                                  Technologi
st:Marco Antonio Rodney, RT(R)(CT)        CTDI:        DLP:        Trnscb Date/Time: 10
/2019 () [id number]                       Orig Print D/T: S: 10/01/2019 (
)      PAGE  2                       Signed Report                          
    COMPREHENSIVE METABOLIC PANEL2019-10-01 19:35:00* 



             Test Item    Value        Reference Range Interpretation Comments

 

             SODIUM (test code = NA) 138 mEq/L    134-147      N             

 

             POTASSIUM (test code = K) 3.1 mEq/L    3.4-5.0      L             

 

             CHLORIDE (test code = CL) 104 mEq/L    100-108      N             

 

             CARBON DIOXIDE (test code = CO2) 24 mEq/L     21-33        N       

      

 

             ANION GAP (test code = GAP) 13           0-20         N            

 

 

             GLUCOSE (test code = GLU) 138 mg/dL           H             

 

             BLOOD UREA NITROGEN (test code = BUN) 29 mg/dL     7-18         H  

           

 

             GLOMERULAR FILTRATION RATE (test code = GFR) 8.8          70-80    

    L            Units of measure = 

ml/min/1.73 m2

 

             CREATININE (test code = CREAT) 6.3 mg/dL    0.6-1.3      H         

    

 

             TOTAL PROTEIN (test code = PROT) 5.5 g/dL     6.4-8.2      L       

      

 

             ALBUMIN (test code = ALB) 2.10 g/dL    3.4-5.0      L             

 

             CALCIUM (test code = CA) 7.4 mg/dL    8.0-10.5     L             

 

             BILIRUBIN TOTAL (test code = BILT) 0.4 MG/DL    <1.5         N     

        

 

             SGOT/AST (test code = AST) 40 IUnit/L   15-37        H             

 

             SGPT/ALT (test code = ALT) 32 IUnit/L   15-65        N             

 

             ALKALINE PHOSPHATASE TOTAL (test code = ALKP) 129 IUnit/L    

     H             





NEUROCBC W/AUTO DIFF2019-10-01 19:32:00* 



             Test Item    Value        Reference Range Interpretation Comments

 

             WHITE BLOOD CELL (test code = WBC) 10.89 x10 3/uL 4.5-11.0     N   

          

 

             RED BLOOD CELL (test code = RBC) 3.18 x10 6/uL 4.00-5.60    L      

       

 

             HEMOGLOBIN (test code = HGB) 9.4 g/dL     12.5-16.9    L           

  

 

             HEMATOCRIT (test code = HCT) 29.7 %       37.5-50.7    L           

  

 

             MEAN CELL VOLUME (test code = MCV) 93.4 fL      81.0-99.0    N     

        

 

             MEAN CELL HGB (test code = MCH) 29.6 pg      27.0-33.0    N        

     

 

             MEAN CELL HGB CONCETRATION (test code = MCHC) 31.6 g/dL    33.0-37.

0    L             

 

             RED CELL DISTRIBUTION WIDTH CV (test code = RDW) 15.0 %       11.5-

14.5    H             

 

             RED CELL DISTRIBUTION WIDTH SD (test code = RDW-SD) 51.7 fL      37

.0-54.0    N             

 

             PLATELET COUNT (test code = PLT) 275 x10 3/uL 150-400      N       

      

 

             MEAN PLATELET VOLUME (test code = MPV) 9.0 fL       7.0-9.0      N 

            

 

             NEUTROPHIL % (test code = NT%) 88.5 %       56.0-77.0    H         

    

 

             IMMATURE GRANULOCYTE % (test code = IG%) 4.0 %        0.0-2.0      

H             

 

             LYMPHOCYTE % (test code = LY%) 4.5 %        14.0-32.0    L         

    

 

             MONOCYTE % (test code = MO%) 2.7 %        4.8-9.0      L           

  

 

             EOSINOPHIL % (test code = EO%) 0.0 %        0.3-3.7      L         

    

 

             BASOPHIL % (test code = BA%) 0.3 %        0.0-2.0      N           

  

 

             NUCLEATED RBC % (test code = NRBC%) 0.0 %        0-0          N    

         

 

             NEUTROPHIL # (test code = NT#) 9.64 x10 3/uL 2.0-7.6      H        

     

 

             IMMATURE GRANULOCYTE # (test code = IG#) 0.44 x10 3/uL 0.00-0.03   

 H             

 

             LYMPHOCYTE # (test code = LY#) 0.49 x10 3/uL 1.0-3.8      L        

     

 

             MONOCYTE # (test code = MO#) 0.29 x10 3/uL 0.1-0.8      N          

   

 

             EOSINOPHIL # (test code = EO#) 0.00 x10 3/uL 0.0-0.2      N        

     

 

             BASOPHIL # (test code = BA#) 0.03 x10 3/uL 0.0-0.2      N          

   

 

             NUCLEATED RBC # (test code = NRBC#) 0.00 x10 3/uL 0.0-0.1      N   

          

 

             MANUAL DIFF REQUIRED (test code = MDIFF) NO                        

              





NEUROPROTHROMBIN [id number] 19:30:00* 



             Test Item    Value        Reference Range Interpretation Comments

 

             PROTHROMBIN TIME PATIENT (test code = PTP) 11.0 SECONDS 9.3-12.9   

  N             

 

             INTERNATIONAL NORMAL RATIO (test code = INR) 1.0          0.8-1.2  

    N                            

TARGET INR BY INDICATION   Indication                                      INR1.
Prophylaxis of venous thrombosis             2.0 - 3.0   (orthopedic surgery), 
Prophylaxis of   venous thrombosis (other than high-risk   surgery), Treatment 
of Deep Vein   Thrombosis/Pulmonary Embolism, Prevention   of systemic embolism 
- Tissue heart valves,   Acute Myocardial Infarction (to prevent   systemic 
embolism), Valvular heart disease,   Atrial Fibrillation, Bileaflet mechanical  
valve in aortic position.2. Mechanical prosthetic valves (high risk),    2.5 - 
3.5   Presence of Lupus Anticoagulant or   Antiphospholipid Antibodies, 
Prevention of   systemic embolism - Acute Myocardial Infarction   (to prevent 
recurrent infarct).





NEUROTHROMBOPLASTIN TIME [id number] 19:30:00* 



             Test Item    Value        Reference Range Interpretation Comments

 

             THROMBOPLASTIN TIME PARTIAL (test code = PTT) 26.9 Seconds 25.0-39.

5                      

Therapeutic Range: 50.4 - 88.3 Seconds        **** Effective 2019 ****





NEUROPROTHROMBIN TIME2019-10-01 19:23:00* 



             Test Item    Value        Reference Range Interpretation Comments

 

             PROTHROMBIN TIME PATIENT (test code = PTP) 11.0 SECONDS 9.3-12.9   

  N             

 

             INTERNATIONAL NORMAL RATIO (test code = INR) 1.0          0.8-1.2  

    N                            

TARGET INR BY INDICATION   Indication                                      INR1.
Prophylaxis of venous thrombosis             2.0 - 3.0   (orthopedic surgery), 
Prophylaxis of   venous thrombosis (other than high-risk   surgery), Treatment 
of Deep Vein   Thrombosis/Pulmonary Embolism, Prevention   of systemic embolism 
- Tissue heart valves,   Acute Myocardial Infarction (to prevent   systemic 
embolism), Valvular heart disease,   Atrial Fibrillation, Bileaflet mechanical  
valve in aortic position.2. Mechanical prosthetic valves (high risk),    2.5 - 
3.5   Presence of Lupus Anticoagulant or   Antiphospholipid Antibodies, 
Prevention of   systemic embolism - Acute Myocardial Infarction   (to prevent 
recurrent infarct).





NEUROTHROMBOPLASTIN TIME [id number] 19:23:00* 



             Test Item    Value        Reference Range Interpretation Comments

 

             THROMBOPLASTIN TIME PARTIAL (test code = PTT)  Seconds     25.0-39.

5                  





NEUROGLUBED2019-10-01 16:43:00* 



             Test Item    Value        Reference Range Interpretation Comments

 

             GLUBED (test code = GLUBED) 125 MG/DL           H            

Performed by certified  

at  [facility]





LACTIC ACID REPEAT2019-10-01 15:38:00* 



             Test Item    Value        Reference Range Interpretation Comments

 

             LACTIC ACID REPEAT (test code = LACTR) 1.7 mmol/l   0.4-1.9      N 

            





- CTA CHEST FOR PE2019-10-01 14:10:00  Name: TY PORTER                     
[facility]                    : 1950 Age/S: 69  / M         [address] [place] [address]         Unit #: [id number]     Loc:               [place] [address]                 Phys: Scar Hendrix MD                               
                  Acct: [id number]  Dis Date:               Status: REG ER    
                             PHONE #: [phone]     Exam Date: 10/01/2019  
1315                     FAX #: [phone]      Reason: intubation, 
respiratory distress                    EXAMS:                                  
            CPT CODE:      [id number] CTA CHEST FOR PE                           
[id number]                    Clinical Indication: 69-year-old male with intubation, 
respiratory       distress;        Comparison: CTA chest 2019              
TECHNIQUE: Sequential trans-axial images were obtained with a       multi-
detector helical CT after iodinated contrast administration.       Coronal and 
sagittal reconstructions were obtained. 3-D MIP       reconstructions performed.
      100 cc of Isovue-300 contrast material was used for the exam.             
 DOSE: CT imaging performed at this location utilizes radiation dose       
optimization technique which includes one or more of the followin)       
Automated exposure control; 2) Adjustment of the mA and/or kV       according to
patient's size; 3) Use of iterative reconstruction       techniques        DLP 
(mGy-cm): 392.48                FINDINGS:                LUNG PARENCHYMA AND 
PLEURA: Severe emphysema.  Patchy consolidation is       noted in the bilateral 
upper lobes, posterior bilateral lower lobes,       and in the peripheral 
lingula.  No significant effusion.               AIRWAY: The central airway is 
normal.  Endotracheal tube is present       and terminates approximately 4.0 cm 
from the steve.               MEDIASTINUM: Mildly prominent mediastinal and 
hilar lymph nodes       measuring up to 9 to 10 mm in short axis diameter.  
Right internal       jugular dialysis catheter noted terminating in the distal 
SVC.               HEART: The cardiac chambers appear unremarkable. There is no 
     pericardial effusion.                VASCULAR STRUCTURES: There are no seg
mental pulmonary emboli noted.       The main, right and left pulmonary arteries
are normal. The great       vessels are unremarkable.  Stable mild aneurysmal d
ilatation of the       ascending thoracic aorta measuring up to 4.1 cm in diamet
er.  There is       no thoracic aortic dissection or aneurysm. The superior vena
cava is       unremarkable.               VISUALIZED UPPER ABDOMEN: The visuali
zed upper abdomen is       unremarkable.                OSSEOUS STRUCTURES: Ther
e are acute osseous abnormalities.             PAGE  1                       Sig
mic Report                    (CONTINUED)   Name: TY PORTER                 
   [place]                    : 1950 Age/S: 69  / M         [address] [place] [address]         Unit #: [id number]     Loc:               [place] [address]                 Phys: Scar Hendrix MD                               
                  Acct: [id number]  Dis Date:               Status: REG ER    
                             PHONE #: [phone]     Exam Date: 10/01/2019  
1315                     FAX #: [phone]      Reason: intubation, respirato
ry distress                    EXAMS:                                           
   CPT CODE:      [id number] CTA CHEST FOR PE                           [address]    
          <Continued>                  IMPRESSION:          1.  No acute 
pulmonary embolism identified.         2.  Patchy consolidation in the bilateral
lungs concerning for an         infectious/inflammatory process.         3.  
Severe emphysema.         4.  Mildly prominent mediastinal and hilar lymph 
nodes, likely         reactive.         5.  Stable mild aneurysmal dilatation of
the ascending thoracic aorta         measuring up to 4.1 cm in diameter.        
                              SL:  [id number]          ** Electronically 
Signed by CRIAG Cantu on 10/01/2019 at 1410 **                      Reported
and signed by: Luzma Cantu M.D.                        CC: Scar Hendrix MD    
                                                                                
                                 Technologist:Farheen Nayak RT(R)(CT); Ro  
CTDI:        DLP:        Trnscb Date/Time: 10/01/2019 (1410) [email or web address]         
             Orig Print D/T: S: 10/01/2019 (1409)      PAGE  2                  
    Signed Report                               PROCALCITONIN (PCT)2019-10-01 
14:06:00* 



             Test Item    Value        Reference Range Interpretation Comments

 

             PROCALCITONIN (PCT) (test code = PROCAL) 0.50 ng/mL   0.00-0.05    

H            PROCALCITONIN 

(PCT) NORMAL RANGE (ADULT): <0.05 NG/ML. * a concentration <0.5 ng/mL represents
a low risk of  severe sepsis and/or septic shock.* a concentration >2 ng/mL 
represents a high risk of severe  sepsis and/or septic shock.Nevertheless, 
concentrations <0.5 ng/mL do not exclude aninfection, on account of localized 
infections (withoutsystemic signs) which can be associated with such 
lowconcentrations, or a systemic infection in its initialstages (< 6 hours). 
Furthermore, increased procalcitonincan occur without infection. PCT 
concentrations between 0.5and 2.0 ng/mL should be interpreted taking into 
account thepatient's history. It is recommended to retest PCT within6-24 hours 
if any concentrations <2 ng/mL are obtained.





ARTERIAL BLOOD GAS2019-10-01 14:05:00* 



             Test Item    Value        Reference Range Interpretation Comments

 

             ARTERIAL BLOOD GAS PH (test code = PHA) 7.384        7.35-7.45    N

             

 

             ARTERIAL BLOOD GAS PCO2 (test code = PCO2A) 40.0 mmHg    35-45     

   N             

 

             ARTERIAL BLOOD GAS PO2 (test code = PO2A) 109 mmHg           

 H             

 

             BICARBONATE TOTAL HCO3 (test code = HCO3) 23.9 mmol/L  22.0-26.0   

 N             

 

             BASE EXCESS (test code = URIEL) -1.0 mmol/L  -4-4         N          

   

 

             ABG O2 SATURATION (test code = SATA) 98 %                N   

          

 

             FIO2 (test code = FIO2A) 50 %                                    

 

             ABG DELIVERY (test code = MATTY) Vent                               

     

 

             ABG VENT MODE (test code = MODEA) AC v con                         

       

 

             ABG VENT RESP RATE (test code = RRA) 16 /MIN                       

          

 

             ABG TIDAL VOLUME (test code = TVA) 550 ml                          

        

 

             ABG PEEP (test code = PEEPA) 5 cmH2O                               

 Performed by certified  at  

[facility]

 

             ABG TEMPERATURE (test code = TEMPA) 98.6 F                         

         

 

             ABG SITE (test code = SITEA) R Rad                                 

  

 

             PREDICTED AA GRADIENT (test code = AP) 80                          

            

 

             PREDICTED PO2 (test code = OP) 228                                 

    

 

             a/A RATIO (test code = RATIO) 0.35                                 

   

 

             TCO2 ARTERIAL (test code = TCO2A) 25                               

       

 

             A-A GRADIENT (test code = AAGRADE) 200                             

        





B-TYPE NATRIURETIC PEPTIDE2019-10-01 13:41:00* 



             Test Item    Value        Reference Range Interpretation Comments

 

             B-TYPE NATRIURETIC PEPTIDE (test code = BNP) 193.3 PG/ML  0-100    

    H             





- CT HEAD/BRAIN W/O CONT2019-10-01 13:40:00  Name: TY PORTER                
    [place]                    : 1950 Age/S: 69  / M         
[address] [place] [address]         Unit #: [id number]     Loc:               
[place] [address]                 Phys: Scar Hendrix MD                      
                           Acct: [id number]  Dis Date:               Status: 
REG ER                                  PHONE #: [phone]     Exam Date: 
10/01/2019  1315                     FAX #: [phone]      Reason: 
intubation, respiratory distress, ESRD              EXAMS:                      
                        CPT CODE:      [id number] CT HEAD/BRAIN W/O CONT         
           [id number]                    CT head without contrast 10/1/2019          
    HISTORY: Respiratory distress.  Intubation               PROCEDURE: Multiple
axial images from the skull base to the skull       vertex were obtained without
contrast.  Coronal and sagittal       reconstructed images were performed       
CT imaging performed at this location utilizes radiation dose       optimization
techniques which include one or more of the following:       -Automated exposure
control       -Adjustment of the mA and/or kV according to patient size       -
Use of iterative reconstruction technique       CT Radiation Dose .66 
mGy-cm                       FINDINGS: No acute intracranial hemorrhage, midline
shift, extra-axial       fluid collection, or hydrocephalus is present.  There 
is mild atrophy.        There are mild white matter hypodensities.  The 
visualized mastoid       air cells are clear.  There is no air-fluid level in 
the visualized       paranasal sinuses.  Frontal scalp soft tissue gas may 
represent       laceration.  There is nonspecific gas within the right orbit, 
possibly       venous.  Possible venous gas in the superior soft tissues of the 
neck       are noted.                 IMPRESSION:         1.  No acute intrac
ranial abnormality.         2.  Mild atrophy and mild chronic microvascular isch
emic changes.         3.  Gas within the right orbit and superior soft tissues o
f neck,         possibly venous and related to IV placement.                   S
L:  [id number]                  ** Electronically Signed by CRAIG buckner **          **              on 10/01/2019 at 1340              **          
           Reported and signed by: Jaden Augustin M.D.       CC: Scar lerner MD                                                                         
                                             Technologist:Farheen Nayak RT(R
)(CT); Ro  CTDI:        DLP:        Trnscb Date/Time: 10/01/2019 (3292) [email or web address]
M4                       Orig Print D/T: S: 10/01/2019 (5611)      PAGE  1      
                Signed Report                               CBC W/AUTO DIFF
2019-10-01 13:37:00* 



             Test Item    Value        Reference Range Interpretation Comments

 

             WHITE BLOOD CELL (test code = WBC) 12.29 x10 3/uL 4.5-11.0     H   

          

 

             RED BLOOD CELL (test code = RBC) 3.11 x10 6/uL 4.00-5.60    L      

       

 

             HEMOGLOBIN (test code = HGB) 9.2 g/dL     12.5-16.9    L           

  

 

             HEMATOCRIT (test code = HCT) 29.7 %       37.5-50.7    L           

  

 

             MEAN CELL VOLUME (test code = MCV) 95.5 fL      81.0-99.0    N     

        

 

             MEAN CELL HGB (test code = MCH) 29.6 pg      27.0-33.0    N        

     

 

             MEAN CELL HGB CONCETRATION (test code = MCHC) 31.0 g/dL    33.0-37.

0    L             

 

             RED CELL DISTRIBUTION WIDTH CV (test code = RDW) 15.1 %       11.5-

14.5    H             

 

             RED CELL DISTRIBUTION WIDTH SD (test code = RDW-SD) 53.0 fL      37

.0-54.0    N             

 

             PLATELET COUNT (test code = PLT) 292 x10 3/uL 150-400      N       

      

 

             MEAN PLATELET VOLUME (test code = MPV) 9.2 fL       7.0-9.0      H 

            

 

             NEUTROPHIL % (test code = NT%) 50.5 %       56.0-77.0    L         

    

 

             IMMATURE GRANULOCYTE % (test code = IG%) 5.5 %        0.0-2.0      

H             

 

             LYMPHOCYTE % (test code = LY%) 30.8 %       14.0-32.0    N         

    

 

             MONOCYTE % (test code = MO%) 12.6 %       4.8-9.0      H           

  

 

             EOSINOPHIL % (test code = EO%) 0.0 %        0.3-3.7      L         

    

 

             BASOPHIL % (test code = BA%) 0.6 %        0.0-2.0      N           

  

 

             NUCLEATED RBC % (test code = NRBC%) 0.0 %        0-0          N    

         

 

             NEUTROPHIL # (test code = NT#) 6.21 x10 3/uL 2.0-7.6      N        

     

 

             IMMATURE GRANULOCYTE # (test code = IG#) 0.68 x10 3/uL 0.00-0.03   

 H             

 

             LYMPHOCYTE # (test code = LY#) 3.78 x10 3/uL 1.0-3.8      N        

     

 

             MONOCYTE # (test code = MO#) 1.55 x10 3/uL 0.1-0.8      H          

   

 

             EOSINOPHIL # (test code = EO#) 0.00 x10 3/uL 0.0-0.2      N        

     

 

             BASOPHIL # (test code = BA#) 0.07 x10 3/uL 0.0-0.2      N          

   

 

             NUCLEATED RBC # (test code = NRBC#) 0.00 x10 3/uL 0.0-0.1      N   

          

 

             MANUAL DIFF REQUIRED (test code = MDIFF) NO                        

             SLIDE REVIEWED, CONSISTENT WITH 

AUTO DIFF.





BASIC METABOLIC PANEL2019-10- 13:29:00* 



             Test Item    Value        Reference Range Interpretation Comments

 

             SODIUM (test code = NA) 137 mEq/L    134-147      N             

 

             POTASSIUM (test code = K) 2.8 mEq/L    3.4-5.0      LL            

 

             CHLORIDE (test code = CL) 101 mEq/L    100-108      N             

 

             CARBON DIOXIDE (test code = CO2) 22 mEq/L     21-33        N       

      

 

             ANION GAP (test code = GAP) 17           0-20         N            

 

 

             GLUCOSE (test code = GLU) 152 mg/dL           H             

 

             BLOOD UREA NITROGEN (test code = BUN) 30 mg/dL     7-18         H  

           

 

             GLOMERULAR FILTRATION RATE (test code = GFR) 8.1          70-80    

    L            Units of measure = 

ml/min/1.73 m2

 

             CREATININE (test code = CREAT) 6.8 mg/dL    0.6-1.3      H         

    

 

             CALCIUM (test code = CA) 7.7 mg/dL    8.0-10.5     L             





HEPATIC FUNCTION PANEL2019-10-01 13:29:00* 



             Test Item    Value        Reference Range Interpretation Comments

 

             TOTAL PROTEIN (test code = PROT) 5.5 g/dL     6.4-8.2      L       

      

 

             ALBUMIN (test code = ALB) 2.10 g/dL    3.4-5.0      L             

 

             BILIRUBIN TOTAL (test code = BILT) 0.4 MG/DL    <1.5         N     

        

 

             BILIRUBIN DIRECT (test code = BILD) 0.10 MG/DL   0.0-0.30          

         

 

             BILIRUBIN INDIRECT (test code = BILIND) 0.30 MG/DL                 

             

 

             SGOT/AST (test code = AST) 32 IUnit/L   15-37        N             

 

             SGPT/ALT (test code = ALT) 23 IUnit/L   15-65        N             

 

             ALKALINE PHOSPHATASE TOTAL (test code = ALKP) 109 IUnit/L    

     N             





TROPONIN--10-01 13:29:00* 



             Test Item    Value        Reference Range Interpretation Comments

 

             TROPONIN-I (test code = TROPI) 0.016 ng/mL  0.000-0.045  N         

   Negative:             

<= 0.045 Positive:             >= 0.046 Correlation with serial results, other 
cardiac markers andclinical findings is necessary to determine the 
clinicalsignificance of this result. Results using different methodologies 
should not be comparedto one another as quantitative results may vary by method.





PROTHROMBIN TIME2019-10-01 13:17:00* 



             Test Item    Value        Reference Range Interpretation Comments

 

             PROTHROMBIN TIME PATIENT (test code = PTP) 12.9 SECONDS 9.3-12.9   

  N             

 

             INTERNATIONAL NORMAL RATIO (test code = INR) 1.2          0.8-1.2  

    N                            

TARGET INR BY INDICATION   Indication                                      INR1.
Prophylaxis of venous thrombosis             2.0 - 3.0   (orthopedic surgery), 
Prophylaxis of   venous thrombosis (other than high-risk   surgery), Treatment 
of Deep Vein   Thrombosis/Pulmonary Embolism, Prevention   of systemic embolism 
- Tissue heart valves,   Acute Myocardial Infarction (to prevent   systemic 
embolism), Valvular heart disease,   Atrial Fibrillation, Bileaflet mechanical  
valve in aortic position.2. Mechanical prosthetic valves (high risk),    2.5 - 
3.5   Presence of Lupus Anticoagulant or   Antiphospholipid Antibodies, 
Prevention of   systemic embolism - Acute Myocardial Infarction   (to prevent 
recurrent infarct).





THROMBOPLASTIN TIME [id number] 13:17:00* 



             Test Item    Value        Reference Range Interpretation Comments

 

             THROMBOPLASTIN TIME PARTIAL (test code = PTT) 76.6 Seconds 25.0-39.

5    H                

Therapeutic Range: 50.4 - 88.3 Seconds        **** Effective 2019 ****





UA RFLX MICR CULT IF INDICATED2019-10-01 13:14:00* 



             Test Item    Value        Reference Range Interpretation Comments

 

             UA COLOR (test code = COLU) YELLOW       YEL/STRAW                 

 

 

             UA APPEARANCE (test code = APPU) CLEAR        CLEAR                

      

 

             UA GLUCOSE DIPSTICK (test code = DGLUU) 2+           NEGATIVE     A

             

 

             UA BILIRUBIN DIPSTICK (test code = BILU) NEGATIVE     NEGATIVE     

              

 

             UA KETONE DIPSTICK (test code = KETU) NEGATIVE     NEGATIVE        

           

 

             UA SPECIFIC GRAVITY (test code = SGU) 1.010        1.005-1.030  N  

           

 

             UA BLOOD DIPSTICK (test code = WHITNEY) NEGATIVE     NEGATIVE          

         

 

             UA PH DIPSTICK (test code = ANISHA) 8.0          5.0-7.0      H       

      

 

             UA PROTEIN DIPSTICK (test code = PROU) 3+           NEGATIVE     A 

            

 

             UA UROBILINIOGEN DIPSTICK (test code = URO) 0.2 mg/dL    0.2-1.0   

                 

 

             UA NITRITE DIPSTICK (test code = JAMES) NEGATIVE     NEGATIVE       

            

 

             UA LEUKOCYTE ESTERASE DIPSTICK (test code = LEUU) NEGATIVE     NEGA

TIVE                   

 

             UA WBC (test code = WBCU) 4-9 WBC/HPF  0-3          A             

 

             UA RBC (test code = RBCU) 4-10 RBC/HPF 0-3                        

 

             UA WBC NO REFLEX (test code = WBCUCL) 4-9 WBC/HPF  0-3          A  

           

 

             UA BACTERIA (test code = BACU) TRACE /HPF   NONE SEEN              

    

 

             UA SQUAMOUS CELLS (test code = SQU) 0-5 /HPF     NONE SEEN         

         

 

             UA HYALINE CAST (test code = HYALU) 6-10 /LPF    NONE SEEN         

         

 

             UA MUCUS (test code = MUCU) TRACE /LPF   NONE SEEN                 

 

 

             UA AMORPHOUS SEDIMENT (test code = AMORU) TRACE /HPF   NONE        

               





Indication for culture:     Sev. Sepsis-no other srcSpecimen Description: CRICKET
 CATHCBC W/AUTO DIFF2019-10-01 13:03:00* 



             Test Item    Value        Reference Range Interpretation Comments

 

             WHITE BLOOD CELL (test code = WBC) 12.29 x10 3/uL 4.5-11.0     H   

          

 

             RED BLOOD CELL (test code = RBC) 3.11 x10 6/uL 4.00-5.60    L      

       

 

             HEMOGLOBIN (test code = HGB) 9.2 g/dL     12.5-16.9    L           

  

 

             HEMATOCRIT (test code = HCT) 29.7 %       37.5-50.7    L           

  

 

             MEAN CELL VOLUME (test code = MCV) 95.5 fL      81.0-99.0    N     

        

 

             MEAN CELL HGB (test code = MCH) 29.6 pg      27.0-33.0    N        

     

 

             MEAN CELL HGB CONCETRATION (test code = MCHC) 31.0 g/dL    33.0-37.

0    L             

 

             RED CELL DISTRIBUTION WIDTH CV (test code = RDW) 15.1 %       11.5-

14.5    H             

 

             RED CELL DISTRIBUTION WIDTH SD (test code = RDW-SD) 53.0 fL      37

.0-54.0    N             

 

             PLATELET COUNT (test code = PLT) 292 x10 3/uL 150-400      N       

      

 

             MEAN PLATELET VOLUME (test code = MPV) 9.2 fL       7.0-9.0      H 

            

 

             NEUTROPHIL % (test code = NT%)  %           56.0-77.0              

    

 

             LYMPHOCYTE % (test code = LY%)  %           14.0-32.0              

    

 

             NEUTROPHIL # (test code = NT#)  x10 3/uL    2.0-7.6                

    

 

             LYMPHOCYTE # (test code = LY#)  x10 3/uL    1.0-3.8                

    

 

             MANUAL DIFF REQUIRED (test code = MDIFF)                           

              





- XR CHEST 1 -10-01 12:58:00 FAX:         Scar Hendrix MD   [phone]
   [place]:   St: PRE----------
---------------------------------------------------------------------  Name:   TY DOUGLAS                    [facility]                    : [birth year]  Age/S: 69/M           [address] [place], [place] [address]         Unit #: [id number]    
 [place]: [place] [address]                 Phys: Scar Hendrix MD   
                                              Acct: [id number] Dis Date:      
        Status: PRE ER                                 PHONE #: [phone]    
Exam Date:     10/01/2019     1251                   FAX #: [phone]     
Reason: intubation, respiratory distress, ESRD             EXAMS:               
                               CPT CODE:      [id number] XR CHEST 1 V            
                  [id number]                    CHEST 1 VIEW: 10/1/2019              
COMPARISON: 2019               CLINICAL HISTORY: intubation, re
spiratory distress, ESRD               FINDINGS: The cardiovascular silhouette i
s normal in size.                Endotracheal tube, NG tube and right internal j
ugular large bore       central venous catheter noted.               There are d
iffuse bilateral interstitial type infiltrates, most       pronounced in the upp
er lobes.  Findings have worsened when compared       with the prior study.     
         No pneumothorax identified.                 IMPRESSION:                
   Interval worsening of bilateral interstitial infiltrates/opacities.          
This may represent changes of interstitial pneumonitis.  Interstitial         
edema is not excluded.          ** Electronically Signed by CARIG Genao on
10/01/2019 at 2585 **                      Reported and signed by: Raulito Genao M.D.                  CC: Scar Hendrix MD                                     
                                                                                
Technologist: Sary Mix RT(R)                                   Trnscrd 
Date/Time/By: 10/01/2019 (4258) : By: [email or web address]          Orig Print D/T: S: 10
/2019 (5581)                         PAGE  1                       Signed Rep
ort                               TROPONIN-I RAPID2019-10-01 12:45:00* 



             Test Item    Value        Reference Range Interpretation Comments

 

             TROPONIN-I RAPID (test code = TROPIRAP) 0.03 ng/mL   0.00-0.08    N

            Performed by 

certified  at  [facility]          Negative:          <= 0.08   
      Positive:          >= 0.09An elevated troponin value alone is not 
sufficient todiagnose a myocardial infarction. Rather, the patient sclinical 
presentation (history, physical exam) and ECGshould be used in conjunction with 
troponin in thediagnostic evaluation of suspected myocardial infarction. Aserial
sampling protocol is recommended to facilitate the identification of temporal 
changes in troponin levels characteristic of MI.





LACTIC ACID POC2019-10-01 12:42:00* 



             Test Item    Value        Reference Range Interpretation Comments

 

             LACTIC ACID POC (test code = LACTP) 8.0 MMOL/L   0.90-1.70    HH   

        Performed by 

certified  at  [facility]





CHEMISTRY 8 PROFILE2019-10-01 12:38:00* 



             Test Item    Value        Reference Range Interpretation Comments

 

             ISTAT-SODIUM (test code = NAP)  MMOL/L      134-147                

    

 

             ISTAT-POTASSIUM (test code = KP)  MMOL/L      3.4-5.0              

      

 

             ISTAT-CHLORIDE (test code = CLP)  MMOL/L      100-108              

      

 

             ISTAT CARBON DIOXIDE (test code = ISTAT-CO2)  mmol/L      21-33    

    N             

 

             ISTAT CALCIUM IONIZED (test code = ISTAT-TASHIA)  MG/DL       1.12-1.3

2                  

 

             ISTAT-GLUCOSE (test code = GLUP)  MG/DL              H       

      

 

             ISTAT-BUN (test code = BUNP)  MG/DL       7-18         H           

  

 

             BEDSIDE CREATININE (test code = CREATBED)  MG/DL       0.6-1.3     

 H             

 

             GLOMERULAR FILTRATION RATE POC (test code = GFRBED) 8 ML/MIN       

                         





CHEMISTRY 8 PROFILE2019-10-01 12:38:00* 



             Test Item    Value        Reference Range Interpretation Comments

 

             ISTAT-SODIUM (test code = NAP) 137 MMOL/L   134-147      N         

    

 

             ISTAT-POTASSIUM (test code = KP) 2.7 MMOL/L   3.4-5.0      LL      

      

 

             ISTAT-CHLORIDE (test code = CLP) 98 MMOL/L    100-108      L       

     Performed by certified 

 at  [facility]

 

             ISTAT CARBON DIOXIDE (test code = ISTAT-CO2) 21.0 mmol/L  21-33    

    N             

 

             ISTAT CALCIUM IONIZED (test code = ISTAT-TASHIA) 1.04 MG/DL   1.12-1.3

2    L             

 

             ISTAT-GLUCOSE (test code = GLUP) 154 MG/DL           H       

      

 

             ISTAT-BUN (test code = BUNP) 30 MG/DL     7-18         H           

  

 

             BEDSIDE CREATININE (test code = CREATBED) 7.3 MG/DL    0.6-1.3     

 H             

 

             GLOMERULAR FILTRATION RATE POC (test code = GFRBED) 8 ML/MIN       

                         





- XR FLUOROSCOPY 0-60 [id number] 14:11:00 FAX: Cesar Danielle MD     
[phone]    [place]:   St: DIS FAX: Jonny Lerner MD   
[phone]   ----------------------------------------
---------------------------------------  Name:   TY PORTER                  
 [facility]                    : 1950  Age/S: 69/M           [address] [place] [address]         Unit #: [id number]      Loc: G.5505       [facility]
X [address]                 Phys: Cesar Mills MD                                  
                 Acct: [id number] Dis Date:   2019    Status: DIS IN      
                          PHONE #: [phone]     Exam Date:     2019   
 1430                   FAX #: [phone]     Reason: RENAL FAILURE           
                          EXAMS:                                               
CPT CODE:      [id number] XR FLUOROSCOPY 0-60 MIN                    88[address]       
            Study:  - XR FLUOROSCOPY 0-60 MIN 2019 2:00 PM               
Patient Name: TY PORTER MR: [id number]               DATE: 2019 2:00 PM 
: 1950; Age: 69 years  y/o Male               Ordering Physician: Cesar Mills MD               Clinical Indication: RENAL FAILURE               Intra
procedural fluoroscopy was provided by the Department of       Radiology. Any im
ages obtained were interpreted by the surgeon       intraoperatively.           
   Fluoroscopy time: 5 seconds        Dose 0.7 mGy               SL:  [id number]
[id number]                    ** Electronically Signed by HARRY Hoyos **       
    **             on 2019 at 1411            **                      Rep
orted and signed by: Eric Hoyos D.O.                    CC: Cesar Mills MD
; Jonny oLve MD                                                             
                                        Technologist: Isrrael Erazo RT(R)        
                            Trnscrd Date/Time/By: 2019 (9444) : By: Mirza DE LA [id number]          Orig Print D/T: S: 2019 (4085)                         PAG
E  1                       Signed Report                               - XR 
CHEST 1 -06-83 15:04:00 FAX: Jonny Lerner MD   [phone]    
[place]:   St: ADM FAX: Lakesha Morales [phone]   
------------------------------------------------------------------------------- 
Name:   TY PORTER ZANE                    [facility]                    :
1950  Age/S: 69/M           [address] [place] [address] Blvd         Unit #: 
[id number]      Loc: G[facility]       [place] [address]                 Phys: 
Lakesha Madden NP                                                Acct: 
[id number] Dis Date:               Status: ADM IN                             
   PHONE #: [phone]     Exam Date:     2019     1456                 
 FAX #: [phone]     Reason: s/p right IJ tunneled HD cath insertion        
   EXAMS:                                               CPT CODE:      [id number]
XR CHEST 1 V                               [id number]                    EXAM:  
Single view  AP chest.               EXAM DATE:  2019 at 1421 hours         
     CLINICAL HISTORY:  s/p right IJ tunneled HD cath insertion               
COMPARISON:  2019 at 1528 hours                       Right IJ 
catheter is identified with its tip in the region of the       superior vena 
cava/right atrium.  No pneumothorax is identified.               Heart size and 
mediastinum are stable compared to the prior exam.       Bilateral alveolar 
opacities are identified.  This finding has       progressed in the left lung 
when compared to the prior exam.  No       definite effusions are identified.   
   Visualized osseous structures demonstrate no acute abnormalities.            
    IMPRESSION:          Bilateral alveolar opacities with progression noted on 
the left when         compared to the prior exam.                  ** E
lectronically Signed by CRAIG Calderón **          **              on  at 1504              **                      Reported and signed by: Christal Calderón M.D.                 CC: Jonny Love MD; Lakesha Madden NP
                                                                                
                Technologist: Brook Gomez RT(R)                               
    Trnscrd Date/Time/By: 2019 (150) : By: MunaCER           Orig Print
D/T: S: 2019 (5496)                         PAGE  1                       
Signed Report                               CBC W/AUTO [id number] 10:14:00* 



             Test Item    Value        Reference Range Interpretation Comments

 

             WHITE BLOOD CELL (test code = WBC) 15.11 x10 3/uL 4.5-11.0     H   

          

 

             RED BLOOD CELL (test code = RBC) 3.58 x10 6/uL 4.00-5.60    L      

       

 

             HEMOGLOBIN (test code = HGB) 10.6 g/dL    12.5-16.9    L           

  

 

             HEMATOCRIT (test code = HCT) 32.5 %       37.5-50.7    L           

  

 

             MEAN CELL VOLUME (test code = MCV) 90.8 fL      81.0-99.0    N     

        

 

             MEAN CELL HGB (test code = MCH) 29.6 pg      27.0-33.0    N        

     

 

             MEAN CELL HGB CONCETRATION (test code = MCHC) 32.6 g/dL    33.0-37.

0    L             

 

             RED CELL DISTRIBUTION WIDTH CV (test code = RDW) 15.0 %       11.5-

14.5    H             

 

             RED CELL DISTRIBUTION WIDTH SD (test code = RDW-SD) 49.6 fL      37

.0-54.0    N             

 

             PLATELET COUNT (test code = PLT) 132 x10 3/uL 150-400      L       

      

 

             MEAN PLATELET VOLUME (test code = MPV) 12.0 fL      7.0-9.0      H 

            

 

             NEUTROPHIL % (test code = NT%) 79.8 %       56.0-77.0    H         

    

 

             IMMATURE GRANULOCYTE % (test code = IG%) 7.1 %        0.0-2.0      

H             

 

             LYMPHOCYTE % (test code = LY%) 6.8 %        14.0-32.0    L         

    

 

             MONOCYTE % (test code = MO%) 6.0 %        4.8-9.0      N           

  

 

             EOSINOPHIL % (test code = EO%) 0.0 %        0.3-3.7      L         

    

 

             BASOPHIL % (test code = BA%) 0.3 %        0.0-2.0      N           

  

 

             NUCLEATED RBC % (test code = NRBC%) 0.5 %        0-0          H    

         

 

             NEUTROPHIL # (test code = NT#) 12.06 x10 3/uL 2.0-7.6      H       

      

 

             IMMATURE GRANULOCYTE # (test code = IG#) 1.08 x10 3/uL 0.00-0.03   

 H             

 

             LYMPHOCYTE # (test code = LY#) 1.02 x10 3/uL 1.0-3.8      N        

     

 

             MONOCYTE # (test code = MO#) 0.90 x10 3/uL 0.1-0.8      H          

   

 

             EOSINOPHIL # (test code = EO#) 0.00 x10 3/uL 0.0-0.2      N        

     

 

             BASOPHIL # (test code = BA#) 0.05 x10 3/uL 0.0-0.2      N          

   

 

             NUCLEATED RBC # (test code = NRBC#) 0.08 x10 3/uL 0.0-0.1      N   

          

 

             MANUAL DIFF REQUIRED (test code = MDIFF) NO                        

             SLIDE REVIEWED, CONSISTENT WITH 

AUTO DIFF.





RENAL FUNCTION [id number] 08:24:00* 



             Test Item    Value        Reference Range Interpretation Comments

 

             SODIUM (test code = NA) 138 mEq/L    134-147      N             

 

             POTASSIUM (test code = K) 4.3 mEq/L    3.4-5.0      N             

 

             CHLORIDE (test code = CL) 102 mEq/L    100-108      N             

 

             CARBON DIOXIDE (test code = CO2) 25 mEq/L     21-33        N       

      

 

             ANION GAP (test code = GAP) 15           0-20         N            

 

 

             GLUCOSE (test code = GLU) 120 mg/dL           H             

 

             BLOOD UREA NITROGEN (test code = BUN) 67 mg/dL     7-18         H  

           

 

             GLOMERULAR FILTRATION RATE (test code = GFR) 11.0         70-80    

    L            Units of measure = 

ml/min/1.73 m2

 

             CREATININE (test code = CREAT) 5.2 mg/dL    0.6-1.3      H         

    

 

             ALBUMIN (test code = ALB) 1.80 g/dL    3.4-5.0      L             

 

             CALCIUM (test code = CA) 7.8 mg/dL    8.0-10.5     L             

 

             PHOSPHOROUS (test code = PHOS) 4.1 mg/dL    2.5-4.9                

    





CBC W/AUTO [id number] 07:25:00* 



             Test Item    Value        Reference Range Interpretation Comments

 

             WHITE BLOOD CELL (test code = WBC) 15.11 x10 3/uL 4.5-11.0     H   

          

 

             RED BLOOD CELL (test code = RBC) 3.58 x10 6/uL 4.00-5.60    L      

       

 

             HEMOGLOBIN (test code = HGB) 10.6 g/dL    12.5-16.9    L           

  

 

             HEMATOCRIT (test code = HCT) 32.5 %       37.5-50.7    L           

  

 

             MEAN CELL VOLUME (test code = MCV) 90.8 fL      81.0-99.0    N     

        

 

             MEAN CELL HGB (test code = MCH) 29.6 pg      27.0-33.0    N        

     

 

             MEAN CELL HGB CONCETRATION (test code = MCHC) 32.6 g/dL    33.0-37.

0    L             

 

             RED CELL DISTRIBUTION WIDTH CV (test code = RDW) 15.0 %       11.5-

14.5    H             

 

             RED CELL DISTRIBUTION WIDTH SD (test code = RDW-SD) 49.6 fL      37

.0-54.0    N             

 

             PLATELET COUNT (test code = PLT) 132 x10 3/uL 150-400      L       

      

 

             MEAN PLATELET VOLUME (test code = MPV) 12.0 fL      7.0-9.0      H 

            

 

             LYMPHOCYTE % (test code = LY%)  %           14.0-32.0              

    

 

             MANUAL DIFF REQUIRED (test code = MDIFF)                           

              





THROMBOPLASTIN TIME [id number] 06:47:00* 



             Test Item    Value        Reference Range Interpretation Comments

 

             THROMBOPLASTIN TIME PARTIAL (test code = PTT) 27.0 Seconds 25.0-39.

5    N                

Therapeutic Range: 50.4 - 88.3 Seconds        **** Effective 2019 ****





BASIC METABOLIC [id number] 08:15:00* 



             Test Item    Value        Reference Range Interpretation Comments

 

             SODIUM (test code = NA) 134 mEq/L    134-147      N             

 

             POTASSIUM (test code = K) 4.0 mEq/L    3.4-5.0      N             

 

             CHLORIDE (test code = CL) 95 mEq/L     100-108      L             

 

             CARBON DIOXIDE (test code = CO2) 21 mEq/L     21-33        N       

      

 

             ANION GAP (test code = GAP) 22           0-20         H            

 

 

             GLUCOSE (test code = GLU) 133 mg/dL           H             

 

             BLOOD UREA NITROGEN (test code = BUN) 139 mg/dL    7-18         H  

           

 

             GLOMERULAR FILTRATION RATE (test code = GFR) 6.1          70-80    

    L            Units of measure = 

ml/min/1.73 m2

 

             CREATININE (test code = CREAT) 8.7 mg/dL    0.6-1.3      H         

    

 

             CALCIUM (test code = CA) 8.1 mg/dL    8.0-10.5     N             





[id number] 08:15:00* 



             Test Item    Value        Reference Range Interpretation Comments

 

             PHOSPHOROUS (test code = PHOS) 6.1 mg/dL    2.5-4.9      H         

    





[id number] 08:31:00* 



             Test Item    Value        Reference Range Interpretation Comments

 

             PHOSPHOROUS (test code = PHOS) 4.5 mg/dL    2.5-4.9                

    





[id number] 08:31:00* 



             Test Item    Value        Reference Range Interpretation Comments

 

             MAGNESIUM (test code = MAG) 2.70 mg/dL   1.8-2.4      H            

 





BASIC METABOLIC [id number] 04:55:00* 



             Test Item    Value        Reference Range Interpretation Comments

 

             SODIUM (test code = NA) 136 mEq/L    134-147      N             

 

             POTASSIUM (test code = K) 3.6 mEq/L    3.4-5.0      N             

 

             CHLORIDE (test code = CL) 98 mEq/L     100-108      L             

 

             CARBON DIOXIDE (test code = CO2) 27 mEq/L     21-33        N       

      

 

             ANION GAP (test code = GAP) 15           0-20         N            

 

 

             GLUCOSE (test code = GLU) 132 mg/dL           H             

 

             BLOOD UREA NITROGEN (test code = BUN) 60 mg/dL     7-18         H  

           

 

             GLOMERULAR FILTRATION RATE (test code = GFR) 11.8         70-80    

    L            Units of measure = 

ml/min/1.73 m2

 

             CREATININE (test code = CREAT) 4.9 mg/dL    0.6-1.3      H         

    

 

             CALCIUM (test code = CA) 8.6 mg/dL    8.0-10.5     N             





CBC W/AUTO [id number] 04:35:00* 



             Test Item    Value        Reference Range Interpretation Comments

 

             WHITE BLOOD CELL (test code = WBC) 11.90 x10 3/uL 4.5-11.0     H   

          

 

             RED BLOOD CELL (test code = RBC) 3.61 x10 6/uL 4.00-5.60    L      

       

 

             HEMOGLOBIN (test code = HGB) 10.8 g/dL    12.5-16.9    L           

  

 

             HEMATOCRIT (test code = HCT) 32.0 %       37.5-50.7    L           

  

 

             MEAN CELL VOLUME (test code = MCV) 88.6 fL      81.0-99.0    N     

        

 

             MEAN CELL HGB (test code = MCH) 29.9 pg      27.0-33.0    N        

     

 

             MEAN CELL HGB CONCETRATION (test code = MCHC) 33.8 g/dL    33.0-37.

0    N             

 

             RED CELL DISTRIBUTION WIDTH CV (test code = RDW) 16.0 %       11.5-

14.5    H             

 

             RED CELL DISTRIBUTION WIDTH SD (test code = RDW-SD) 52.7 fL      37

.0-54.0    N             

 

             PLATELET COUNT (test code = PLT) 104 x10 3/uL 150-400      L       

      

 

             IMMATURE PLATELET FRACTION (test code = IPF) 6.2 %        0.9-11.2 

    N             

 

             MEAN PLATELET VOLUME (test code = MPV) 10.3 fL      7.0-9.0      H 

            

 

             NEUTROPHIL % (test code = NT%) 91.5 %       56.0-77.0    H         

    

 

             IMMATURE GRANULOCYTE % (test code = IG%) 1.1 %        0.0-2.0      

N             

 

             LYMPHOCYTE % (test code = LY%) 5.1 %        14.0-32.0    L         

    

 

             MONOCYTE % (test code = MO%) 1.9 %        4.8-9.0      L           

  

 

             EOSINOPHIL % (test code = EO%) 0.1 %        0.3-3.7      L         

    

 

             BASOPHIL % (test code = BA%) 0.3 %        0.0-2.0      N           

  

 

             NUCLEATED RBC % (test code = NRBC%) 0.0 %        0-0          N    

         

 

             NEUTROPHIL # (test code = NT#) 10.89 x10 3/uL 2.0-7.6      H       

      

 

             IMMATURE GRANULOCYTE # (test code = IG#) 0.13 x10 3/uL 0.00-0.03   

 H             

 

             LYMPHOCYTE # (test code = LY#) 0.61 x10 3/uL 1.0-3.8      L        

     

 

             MONOCYTE # (test code = MO#) 0.23 x10 3/uL 0.1-0.8      N          

   

 

             EOSINOPHIL # (test code = EO#) 0.01 x10 3/uL 0.0-0.2      N        

     

 

             BASOPHIL # (test code = BA#) 0.03 x10 3/uL 0.0-0.2      N          

   

 

             NUCLEATED RBC # (test code = NRBC#) 0.00 x10 3/uL 0.0-0.1      N   

          

 

             MANUAL DIFF REQUIRED (test code = MDIFF) NO                        

              





RENAL FUNCTION [id number] 08:32:00* 



             Test Item    Value        Reference Range Interpretation Comments

 

             SODIUM (test code = NA) 137 mEq/L    134-147      N             

 

             POTASSIUM (test code = K) 3.3 mEq/L    3.4-5.0      L             

 

             CHLORIDE (test code = CL) 100 mEq/L    100-108      N             

 

             CARBON DIOXIDE (test code = CO2) 30 mEq/L     21-33        N       

      

 

             ANION GAP (test code = GAP) 10           0-20         N            

 

 

             GLUCOSE (test code = GLU) 91 mg/dL            N             

 

             BLOOD UREA NITROGEN (test code = BUN) 30 mg/dL     7-18         H  

           

 

             GLOMERULAR FILTRATION RATE (test code = GFR) 17.4         70-80    

    L            Units of measure = 

ml/min/1.73 m2

 

             CREATININE (test code = CREAT) 3.5 mg/dL    0.6-1.3      H         

    

 

             ALBUMIN (test code = ALB) 1.70 g/dL    3.4-5.0      L             

 

             CALCIUM (test code = CA) 8.2 mg/dL    8.0-10.5     N             

 

             PHOSPHOROUS (test code = PHOS) 1.8 mg/dL    2.5-4.9      L         

    





[id number] 08:32:00* 



             Test Item    Value        Reference Range Interpretation Comments

 

             MAGNESIUM (test code = MAG) 1.80 mg/dL   1.8-2.4      N            

 





CBC W/AUTO [id number] 08:13:00* 



             Test Item    Value        Reference Range Interpretation Comments

 

             WHITE BLOOD CELL (test code = WBC) 16.18 x10 3/uL 4.5-11.0     H   

          

 

             RED BLOOD CELL (test code = RBC) 3.42 x10 6/uL 4.00-5.60    L      

       

 

             HEMOGLOBIN (test code = HGB) 10.2 g/dL    12.5-16.9    L           

  

 

             HEMATOCRIT (test code = HCT) 30.5 %       37.5-50.7    L           

  

 

             MEAN CELL VOLUME (test code = MCV) 89.2 fL      81.0-99.0    N     

        

 

             MEAN CELL HGB (test code = MCH) 29.8 pg      27.0-33.0    N        

     

 

             MEAN CELL HGB CONCETRATION (test code = MCHC) 33.4 g/dL    33.0-37.

0    N             

 

             RED CELL DISTRIBUTION WIDTH CV (test code = RDW) 16.9 %       11.5-

14.5    H             

 

             RED CELL DISTRIBUTION WIDTH SD (test code = RDW-SD) 55.3 fL      37

.0-54.0    H             

 

             PLATELET COUNT (test code = PLT) 172 x10 3/uL 150-400      N       

      

 

             MEAN PLATELET VOLUME (test code = MPV) 10.3 fL      7.0-9.0      H 

            

 

             NEUTROPHIL % (test code = NT%) 88.6 %       56.0-77.0    H         

    

 

             IMMATURE GRANULOCYTE % (test code = IG%) 2.7 %        0.0-2.0      

H             

 

             LYMPHOCYTE % (test code = LY%) 2.9 %        14.0-32.0    L         

    

 

             MONOCYTE % (test code = MO%) 1.7 %        4.8-9.0      L           

  

 

             EOSINOPHIL % (test code = EO%) 3.9 %        0.3-3.7      H         

    

 

             BASOPHIL % (test code = BA%) 0.2 %        0.0-2.0      N           

  

 

             NUCLEATED RBC % (test code = NRBC%) 0.0 %        0-0          N    

         

 

             NEUTROPHIL # (test code = NT#) 14.35 x10 3/uL 2.0-7.6      H       

      

 

             IMMATURE GRANULOCYTE # (test code = IG#) 0.43 x10 3/uL 0.00-0.03   

 H             

 

             LYMPHOCYTE # (test code = LY#) 0.47 x10 3/uL 1.0-3.8      L        

     

 

             MONOCYTE # (test code = MO#) 0.27 x10 3/uL 0.1-0.8      N          

   

 

             EOSINOPHIL # (test code = EO#) 0.63 x10 3/uL 0.0-0.2      H        

     

 

             BASOPHIL # (test code = BA#) 0.03 x10 3/uL 0.0-0.2      N          

   

 

             NUCLEATED RBC # (test code = NRBC#) 0.00 x10 3/uL 0.0-0.1      N   

          

 

             MANUAL DIFF REQUIRED (test code = MDIFF) NO                        

              





CBC W/AUTO [id number] 11:26:00* 



             Test Item    Value        Reference Range Interpretation Comments

 

             WHITE BLOOD CELL (test code = WBC) 12.86 x10 3/uL 4.5-11.0     H   

          

 

             RED BLOOD CELL (test code = RBC) 2.15 x10 6/uL 4.00-5.60    L      

       

 

             HEMOGLOBIN (test code = HGB) 6.5 g/dL     12.5-16.9    L           

  

 

             HEMATOCRIT (test code = HCT) 19.8 %       37.5-50.7    L           

  

 

             MEAN CELL VOLUME (test code = MCV) 92.1 fL      81.0-99.0    N     

        

 

             MEAN CELL HGB (test code = MCH) 30.2 pg      27.0-33.0    N        

     

 

             MEAN CELL HGB CONCETRATION (test code = MCHC) 32.8 g/dL    33.0-37.

0    L             

 

             RED CELL DISTRIBUTION WIDTH CV (test code = RDW) 13.4 %       11.5-

14.5    N             

 

             RED CELL DISTRIBUTION WIDTH SD (test code = RDW-SD) 45.4 fL      37

.0-54.0    N             

 

             PLATELET COUNT (test code = PLT) 265 x10 3/uL 150-400      N       

      

 

             MEAN PLATELET VOLUME (test code = MPV) 9.7 fL       7.0-9.0      H 

            

 

             NEUTROPHIL % (test code = NT%) 89.3 %       56.0-77.0    H         

    

 

             IMMATURE GRANULOCYTE % (test code = IG%) 1.0 %        0.0-2.0      

N             

 

             LYMPHOCYTE % (test code = LY%) 5.4 %        14.0-32.0    L         

    

 

             MONOCYTE % (test code = MO%) 3.3 %        4.8-9.0      L           

  

 

             EOSINOPHIL % (test code = EO%) 0.8 %        0.3-3.7      N         

    

 

             BASOPHIL % (test code = BA%) 0.2 %        0.0-2.0      N           

  

 

             NUCLEATED RBC % (test code = NRBC%) 0.0 %        0-0          N    

         

 

             NEUTROPHIL # (test code = NT#) 11.50 x10 3/uL 2.0-7.6      H       

      

 

             IMMATURE GRANULOCYTE # (test code = IG#) 0.13 x10 3/uL 0.00-0.03   

 H             

 

             LYMPHOCYTE # (test code = LY#) 0.69 x10 3/uL 1.0-3.8      L        

     

 

             MONOCYTE # (test code = MO#) 0.42 x10 3/uL 0.1-0.8      N          

   

 

             EOSINOPHIL # (test code = EO#) 0.10 x10 3/uL 0.0-0.2      N        

     

 

             BASOPHIL # (test code = BA#) 0.02 x10 3/uL 0.0-0.2      N          

   

 

             NUCLEATED RBC # (test code = NRBC#) 0.00 x10 3/uL 0.0-0.1      N   

          

 

             MANUAL DIFF REQUIRED (test code = MDIFF) NO                        

             SLIDE REVIEWED, CONSISTENT WITH 

AUTO DIFF.





TOTAL IRON BINDING [id number] 08:16:00* 



             Test Item    Value        Reference Range Interpretation Comments

 

             SERUM IRON (test code = IRON) 14 mcg/dL           L          

   

 

             TOTAL IRON BINDING CAPACITY (test code = TIBC) 97 mcg/dL    260-445

      L             

 

             UIBC (test code = UIBC) 83 mcg/dL                               

 

             IRON SATURATION (test code = FESAT) 14.4 %       14-34        N    

         





[id number] 08:16:00* 



             Test Item    Value        Reference Range Interpretation Comments

 

             FERRITIN (test code = ALEX) 3308.6 ng/mL 23.9-336.2   H             





BASIC METABOLIC [id number] 07:52:00* 



             Test Item    Value        Reference Range Interpretation Comments

 

             SODIUM (test code = NA) 140 mEq/L    134-147      N             

 

             POTASSIUM (test code = K) 3.3 mEq/L    3.4-5.0      L             

 

             CHLORIDE (test code = CL) 101 mEq/L    100-108      N             

 

             CARBON DIOXIDE (test code = CO2) 29 mEq/L     21-33        N       

      

 

             ANION GAP (test code = GAP) 13           0-20         N            

 

 

             GLUCOSE (test code = GLU) 84 mg/dL            N             

 

             BLOOD UREA NITROGEN (test code = BUN) 32 mg/dL     7-18         H  

           

 

             GLOMERULAR FILTRATION RATE (test code = GFR) 18.6         70-80    

    L            Units of measure = 

ml/min/1.73 m2

 

             CREATININE (test code = CREAT) 3.3 mg/dL    0.6-1.3      H         

    

 

             CALCIUM (test code = CA) 7.8 mg/dL    8.0-10.5     L             





[id number] 07:52:00* 



             Test Item    Value        Reference Range Interpretation Comments

 

             PHOSPHOROUS (test code = PHOS) 3.6 mg/dL    2.5-4.9      N         

    





[id number] 07:52:00* 



             Test Item    Value        Reference Range Interpretation Comments

 

             MAGNESIUM (test code = MAG) 1.80 mg/dL   1.8-2.4      N            

 





CBC W/AUTO [id number] 07:28:00* 



             Test Item    Value        Reference Range Interpretation Comments

 

             WHITE BLOOD CELL (test code = WBC) 12.86 x10 3/uL 4.5-11.0     H   

          

 

             RED BLOOD CELL (test code = RBC) 2.15 x10 6/uL 4.00-5.60    L      

       

 

             HEMOGLOBIN (test code = HGB) 6.5 g/dL     12.5-16.9    L           

  

 

             HEMATOCRIT (test code = HCT) 19.8 %       37.5-50.7    L           

  

 

             MEAN CELL VOLUME (test code = MCV) 92.1 fL      81.0-99.0    N     

        

 

             MEAN CELL HGB (test code = MCH) 30.2 pg      27.0-33.0    N        

     

 

             MEAN CELL HGB CONCETRATION (test code = MCHC) 32.8 g/dL    33.0-37.

0    L             

 

             RED CELL DISTRIBUTION WIDTH CV (test code = RDW) 13.4 %       11.5-

14.5    N             

 

             RED CELL DISTRIBUTION WIDTH SD (test code = RDW-SD) 45.4 fL      37

.0-54.0    N             

 

             PLATELET COUNT (test code = PLT) 265 x10 3/uL 150-400      N       

      

 

             MEAN PLATELET VOLUME (test code = MPV) 9.7 fL       7.0-9.0      H 

            

 

             LYMPHOCYTE % (test code = LY%)  %           14.0-32.0              

    

 

             MANUAL DIFF REQUIRED (test code = MDIFF)                           

              





ACUTE HEPATITIS [id number] 07:14:00* 



             Test Item    Value        Reference Range Interpretation Comments

 

             AB HEPATITIS A IGM (test code = HAVMAB) NON REACTIVE INDEX NON REAC

T.                 

 

             AG HEPATITIS B SURFACE (test code = HBSAG) NON REACTIVE INDEX NonRe

active                

 

             AB HEPATITIS B CORE IGM (test code = HBCMAB) NON REACTIVE INDEX NON

 REACT.                 

 

             AB HEPATITIS C (test code = HCVAB) NON REACTIVE INDEX NON REACT.   

              





COMMENTS: At start of hemodialysisAB HEPATITIS B [id number] 07:14:00* 



             Test Item    Value        Reference Range Interpretation Comments

 

             AB HEPATITIS B SURFACE (test code = HBSAB) < 3.1 mIU/mL Immunity>9.

9 L              Status 

of Immunity                     Anti-HBs Level  ------------------              
      --------------Inconsistent with Immunity                   0.0 - 
9.9Consistent with Immunity                          >9.9Performed At:  
[facility] [id number] [place], [place] [id number] Kyle L MD 
Ph:[phone]





COMMENTS: At start of hemodialysis- CTA CHEST FOR [id number] 03:01:00  Name: 
TY PORTER                     [place]                    : 
1950 Age/S: 69  / M         [address] [place] [address] Blvd         Unit #: [id number]
[id number]     Loc:               N[place] [address]                 Phys: Matteo Camejo MD                                                Acct: [id number]  Di
s Date:               Status: ADM IN                                  PHONE #: [phone]
[phone]     Exam Date: 2019  022                     FAX #: [phone]
[phone]      Reason: hypoxemia persistent                                EXAMS:     
                                         CPT CODE:      [id number] CTA CHEST FOR 
PE                           [address]                    EXAM: CT, CTA CHEST: , 0203 hours               Clinical Indication: Pneumonia. Hypoxemia.      
        Comparison: Chest radiograph 2019.  A VQ scan dated 2019.     
                 TECHNIQUE: CTA of the pulmonary arteries was performed with 100
cc       Isovue intravenous contrast. Helical imaging performed apices to the   
   lung bases.  Multiplanar reconstructions were obtained. 3-D       postpro
cessing reconstruction MIP imaging was performed. CT imaging       was performed
with exposure control parameters to reduce radiation       dose.        All CT 
scans at this location are performed using dose optimization       techniques as
appropriate to perform exam including the following:        * Automated exposure
control        * Adjustment of the mA and /or kV according to patient size (this
      includes techniques or standardized protocols for targeted exams where    
  dose is matched to indication/reason for exam; extremities or head)        * 
Use of  iterative reconstruction technique               CT Radiation Dose DLP: 
571.20 mGy-cm                       FINDINGS:        VASCULAR STRUCTURES:       
Limited due to technique and suboptimal opacification of the pulmonary       
arterial tree.  No gross segmental pulmonary emboli noted. The main,       right
and left pulmonary arteries are normal.                The thoracic aorta is wi
thin normal limits. There is no dissection or       aneurysm. The great vessels 
appear unremarkable. The superior vena       cava is unremarkable.              
        HEART: The cardiac chambers are unremarkable.  There is no CT evidence  
    of right ventricular strain.  There is no pericardial effusion.             
  LUNG PARENCHYMA AND PLEURA: Scattered groundglass and airspace       opacities
in bilateral lung field with dense pulmonary consolidation       in the post
erior right lower lobe.  A calcified granuloma in the right       lower lobe see
n.. There are no pleural effusions. There is no       pneumothorax.             
 AIRWAYS: The central airway is unremarkable.  Trachea is midline.     PAGE  1  
                    Signed Report                    (CONTINUED)   Name: TY PORTER                    : 1950 
Age/S: 69  / M         [address] [place] [address]         Unit #: [id number]     
c:               [place] [address]                 Phys: Matteo Camejo MD   
                                            Acct: [id number]  Dis Date:       
       Status: ADM IN                                  PHONE #: [phone]    
Exam Date: 2019                     FAX #: [phone]      Reason:
hypoxemia persistent                                EXAMS:                      
                        CPT CODE:      [id number] CTA CHEST FOR PE               
           [address]               <Continued>                MEDIASTINUM: Multiple 
small mediastinal and bihilar lymph nodes,       largest measuring 1.4 cm in the
right hilar region,, probably       reactive.  No significant mediastinal 
lymphadenopathy.                VISUALIZED UPPER ABDOMEN: Nonspecific fatty 
stranding in the left       upper quadrant of the abdomen.        Partially 
visualized 1.4 cm cyst in the left kidney.               OSSEOUS STRUCTURES: No 
acute abnormality seen.                         IMPRESSION:         1. Limited 
due to technique.  No gross segmental pulmonary embolism or         thoracic 
aortic dissection.  No evidence for thoracic aortic aneurysm         or 
dissection.         2.  Bilateral lung airspace and groundglass opacities which 
may         represent multifocal pneumonia.  Pulmonary edema in differential.   
     3.  Nonspecific fatty stranding in the left upper quadrant of the         
abdomen, incompletely included in current study.  Cyst at the upper         pole
of the left kidney noted.         4.  Prominent mediastinal and hilar lymph 
nodes, probably reactive.          No bulky adenopathy seen.                    
        SL:  JSYED-H          ** Electronically Signed by CRAIG Eastman on 
2019 at 0301 **                      Reported and signed by: Sesar Eastman M.D.            CC: Jonny Love MD                                           
                                                                           
Technologist:Keturah Palma, RT(R)           CTDI:        DLP:        Trnscb 
Date/Time: 2019 (030) [id number]                       Orig Print D/T: S:
2019 (304)      PAGE  2                       Signed Report              
                - [facility] VEIN [id number] 17:40:00  Name: TY PORTER                    : 1950 Age/S: 69  / M     
   [address] [place] [address]         Unit #: [id number]     Loc:               
[place] [address]                 Phys: Jones Moulton NP                       
                           Acct: [id number]  Dis Date:               Status: 
ADM IN                                  PHONE #: [phone]     Exam Date: 
2019  1734                     FAX #: [phone]      Reason: R/O  DVT  
                                         EXAMS:                                 
             CPT CODE:      [id number] DUP VEIN KALEN                              
[address]                    PROCEDURE: BILATERAL LOWER EXTREMITY VENOUS ULTRASOUND 
2019               INDICATION: Shortness of breath, onset 6 days ago.  Non 
productive       cough.               COMPARISON: Nuclear medicine lung 
perfusion scan from the same day.               TECHNIQUE: Sonographic 
evaluation of the bilateral lower extremity       veins was performed using high
resolution B-mode, pulse and color       Doppler imaging.               
FINDINGS:                RIGHT:       The common femoral, femoral, popliteal and
visualized calf veins are       patent. Normal venous waveforms. The saph
enofemoral junction is       unremarkable.               LEFT:       The common 
femoral, femoral, popliteal and visualized calf veins are       patent. Normal v
enous waveforms. The saphenofemoral junction is       unremarkable.             
           IMPRESSION:         1. No evidence of deep vein thrombosis in the lo
wer extremities.                   SL:  [id number]-H                ** Electronically Si
gned by M.D. Edwin Rodriguez-Reyes **        **                on 2019 at 
1740                **                      Reported and signed by: Edwin Rodrig
uez-Reyes, M.D.           CC: Jonny Love MD; Jones Moulton NP               
                                                                                
    Technologist: Aliyah Ahuja RDMS(OB)(AB)                            Trnscb 
Date/Time: 2019 (1740) [id number]                       Orig Print D/T: S:
2019 (1954)     Probe:                       PAGE  1                      
Signed Report                               - NM LUNG PERF [id number]
16:43:00 FAX: Jonny Lerner MD   [phone]    [place]:   St: ADM 
FAX:         Jones Moulton NP    [phone]   
------------------------------------------------------------------------------- 
[facility]   [facility]                    [facility]
1950  Age/S: 69[facility]M           [address] [place] [address] Blvd         Unit #: 
[id number]      Loc: [address]       [place] [address]                 Phys: 
Jones Moulton NP                                                   Acct: 
[id number] Dis Date:               Status: ADM IN                             
   PHONE #: [phone]     Exam Date:     2019     1631                 
 FAX #: [phone]     Reason: R/O PE                                         
   EXAMS:                                               CPT CODE:      [id number]
NM LUNG PERF PARTICULATE                   [address]                    NUCLEAR 
MEDICINE PERFUSION LUNG SCAN 2019 AT 1516 HOURS.               CLINICAL 
HISTORY: Shortness of breath, onset 6 days ago.  Non       productive cough.  
Pulmonary embolism?               RADIOPHARMACEUTICAL: 99 M [employer] MAA via 
right antecubital fossa       IV for perfusion. No reported injection 
complication.  No ventilation       images were obtained as the patient is on 
BiPAP.               COMPARISON STUDIES: Chest one view 2019.              
FINDINGS: Perfusion images reveal diffuse asymmetric decrease in       biapical 
parenchymal perfusion, mostly on the right.  This may be       related to 
preferential lower lobe perfusion in the presence of known       right lower lo
be consolidation.  No corresponding apical abnormality       seen radiographical
ly.                 IMPRESSION:         1.  Asymmetrically decreased biapical pu
lmonary perfusion without         corresponding radiographic abnormality. Unable
to assign embolism         probability in the absence of ventilation imaging.  
Consider CT         angiogram correlation if the patient is able.               
             SL:  ER-H                ** Electronically Signed by M.D. Edwin Ro
driguez-Reyes **        **                on 2019 at 7883                *
*                      Reported and signed by: Edwin Rodriguez-Reyes, M.D.      
      CC: Jonny Love MD; Jones Moulton NP                                   
                                                                 Technologist: 
TAO Escalona (N)(CT)                         Trnscrd Date/Time/By:  (8273) : By: [email or web address]          Orig Print D/T: S: 2019 (6923)  
                      PAGE  1                       Signed Report               
               ARTERIAL BLOOD [id number] 14:31:00* 



             Test Item    Value        Reference Range Interpretation Comments

 

             ARTERIAL BLOOD GAS PH (test code = PHA) 7.575        7.35-7.45    H

             

 

             ARTERIAL BLOOD GAS PCO2 (test code = PCO2A) 28.7 mmHg    35-45     

   L             

 

             ARTERIAL BLOOD GAS PO2 (test code = PO2A) 47 mmHg            

 L             

 

             BICARBONATE TOTAL HCO3 (test code = HCO3) 26.6 mmol/L  22.0-26.0   

 H             

 

             BASE EXCESS (test code = URIEL) 5.0 mmol/L   -4-4         H          

   

 

             ABG O2 SATURATION (test code = SATA) 89 %                L   

          

 

             FIO2 (test code = FIO2A) 100 %                                   

 

             ABG DELIVERY (test code = MATTY) Bipap                              

     

 

             ABG VENT RESP RATE (test code = RRA) 20 /MIN                       

         Performed by certified 

at  [facility]

 

             ABG TEMPERATURE (test code = TEMPA) 98.6 F                         

         

 

             ABG SITE (test code = SITEA) R Rad                                 

  

 

             PREDICTED AA GRADIENT (test code = AP) 176                         

            

 

             PREDICTED PO2 (test code = OP) 502                                 

    

 

             a/A RATIO (test code = RATIO) 0.07                                 

   

 

             TCO2 ARTERIAL (test code = TCO2A) 28                               

       

 

             A-A GRADIENT (test code = AAGRADE) 632                             

        





BASIC METABOLIC [id number] 14:03:00* 



             Test Item    Value        Reference Range Interpretation Comments

 

             SODIUM (test code = NA) 141 mEq/L    134-147      N             

 

             POTASSIUM (test code = K) 3.1 mEq/L    3.4-5.0      L             

 

             CHLORIDE (test code = CL) 101 mEq/L    100-108      N             

 

             CARBON DIOXIDE (test code = CO2) 30 mEq/L     21-33                

      

 

             ANION GAP (test code = GAP) 13           0-20         N            

 

 

             GLUCOSE (test code = GLU) 102 mg/dL           N             

 

             BLOOD UREA NITROGEN (test code = BUN) 64 mg/dL     7-18         H  

           

 

             GLOMERULAR FILTRATION RATE (test code = GFR) 11.8         70-80    

    L            Units of measure = 

ml/min/1.73 m2

 

             CREATININE (test code = CREAT) 4.9 mg/dL    0.6-1.3      H         

    

 

             CALCIUM (test code = CA) 8.0 mg/dL    8.0-10.5     N             





[id number] 14:03:00* 



             Test Item    Value        Reference Range Interpretation Comments

 

             PHOSPHOROUS (test code = PHOS) 4.5 mg/dL    2.5-4.9                

    





[id number] 14:03:00* 



             Test Item    Value        Reference Range Interpretation Comments

 

             MAGNESIUM (test code = MAG) 2.00 mg/dL   1.8-2.4                   

 





BASIC METABOLIC [id number] 13:50:00* 



             Test Item    Value        Reference Range Interpretation Comments

 

             SODIUM (test code = NA) 141 mEq/L    134-147      N             

 

             POTASSIUM (test code = K) 3.1 mEq/L    3.4-5.0      L             

 

             CHLORIDE (test code = CL) 101 mEq/L    100-108      N             

 

             CARBON DIOXIDE (test code = CO2) 30 mEq/L     21-33                

      

 

             ANION GAP (test code = GAP) 13           0-20         N            

 

 

             GLUCOSE (test code = GLU) 102 mg/dL           N             

 

             BLOOD UREA NITROGEN (test code = BUN) 64 mg/dL     7-18         H  

           

 

             GLOMERULAR FILTRATION RATE (test code = GFR)              70-80    

                  

 

             CREATININE (test code = CREAT)  mg/dL       0.6-1.3                

    

 

             CALCIUM (test code = CA) 8.0 mg/dL    8.0-10.5     N             





[id number] 13:50:00* 



             Test Item    Value        Reference Range Interpretation Comments

 

             PHOSPHOROUS (test code = PHOS)  mg/dL       2.5-4.9                

    





[id number] 13:50:00* 



             Test Item    Value        Reference Range Interpretation Comments

 

             MAGNESIUM (test code = MAG) 2.00 mg/dL   1.8-2.4                   

 





CBC W/AUTO [id number] 13:42:00* 



             Test Item    Value        Reference Range Interpretation Comments

 

             WHITE BLOOD CELL (test code = WBC) 13.55 x10 3/uL 4.5-11.0     H   

          

 

             RED BLOOD CELL (test code = RBC) 2.54 x10 6/uL 4.00-5.60    L      

       

 

             HEMOGLOBIN (test code = HGB) 7.8 g/dL     12.5-16.9    L           

  

 

             HEMATOCRIT (test code = HCT) 22.9 %       37.5-50.7    L           

  

 

             MEAN CELL VOLUME (test code = MCV) 90.2 fL      81.0-99.0          

        

 

             MEAN CELL HGB (test code = MCH) 30.7 pg      27.0-33.0    N        

     

 

             MEAN CELL HGB CONCETRATION (test code = MCHC) 34.1 g/dL    33.0-37.

0    N             

 

             RED CELL DISTRIBUTION WIDTH CV (test code = RDW) 13.4 %       11.5-

14.5    N             

 

             RED CELL DISTRIBUTION WIDTH SD (test code = RDW-SD) 44.1 fL      37

.0-54.0    N             

 

             PLATELET COUNT (test code = PLT) 340 x10 3/uL 150-400      N       

      

 

             MEAN PLATELET VOLUME (test code = MPV) 9.3 fL       7.0-9.0      H 

            

 

             NEUTROPHIL % (test code = NT%) 89.3 %       56.0-77.0    H         

    

 

             IMMATURE GRANULOCYTE % (test code = IG%) 1.2 %        0.0-2.0      

N             

 

             LYMPHOCYTE % (test code = LY%) 5.1 %        14.0-32.0    L         

    

 

             MONOCYTE % (test code = MO%) 3.7 %        4.8-9.0      L           

  

 

             EOSINOPHIL % (test code = EO%) 0.6 %        0.3-3.7      N         

    

 

             BASOPHIL % (test code = BA%) 0.1 %        0.0-2.0      N           

  

 

             NUCLEATED RBC % (test code = NRBC%) 0.0 %        0-0          N    

         

 

             NEUTROPHIL # (test code = NT#) 12.10 x10 3/uL 2.0-7.6      H       

      

 

             IMMATURE GRANULOCYTE # (test code = IG#) 0.16 x10 3/uL 0.00-0.03   

 H             

 

             LYMPHOCYTE # (test code = LY#) 0.69 x10 3/uL 1.0-3.8      L        

     

 

             MONOCYTE # (test code = MO#) 0.50 x10 3/uL 0.1-0.8      N          

   

 

             EOSINOPHIL # (test code = EO#) 0.08 x10 3/uL 0.0-0.2      N        

     

 

             BASOPHIL # (test code = BA#) 0.02 x10 3/uL 0.0-0.2      N          

   

 

             NUCLEATED RBC # (test code = NRBC#) 0.00 x10 3/uL 0.0-0.1      N   

          

 

             MANUAL DIFF REQUIRED (test code = MDIFF) NO                        

              





ACUTE HEPATITIS [id number] 20:24:00* 



             Test Item    Value        Reference Range Interpretation Comments

 

             AB HEPATITIS A IGM (test code = HAVMAB) NON REACTIVE INDEX NON REAC

T.                 

 

             AG HEPATITIS B SURFACE (test code = HBSAG) NON REACTIVE INDEX NonRe

active                

 

             AB HEPATITIS B CORE IGM (test code = HBCMAB) NON REACTIVE INDEX NON

 REACT.                 

 

             AB HEPATITIS C (test code = HCVAB) NON REACTIVE INDEX NON REACT.   

              





COMMENTS: At start of hemodialysisAB HEPATITIS B [id number] 20:24:00* 



             Test Item    Value        Reference Range Interpretation Comments

 

             AB HEPATITIS B SURFACE (test code = HBSAB)                         

                





COMMENTS: At start of hemodialysisACUTE HEPATITIS [id number] 20:01:00* 



             Test Item    Value        Reference Range Interpretation Comments

 

             AB HEPATITIS A IGM (test code = HAVMAB)  INDEX       NON REACT.    

             

 

             AG HEPATITIS B SURFACE (test code = HBSAG) NON REACTIVE INDEX NonRe

active                

 

             AB HEPATITIS B CORE IGM (test code = HBCMAB)  INDEX       NON REACT

.                 

 

             AB HEPATITIS C (test code = HCVAB)  INDEX       NON REACT.         

        





COMMENTS: At start of hemodialysisAB HEPATITIS B [id number] 20:01:00* 



             Test Item    Value        Reference Range Interpretation Comments

 

             AB HEPATITIS B SURFACE (test code = HBSAB)                         

                





COMMENTS: At start of hemodialysis- XR CHEST 1 -51-19 16:12:00 FAX: Jonny Lerner MD   [phone]    [place]:   St: ADM FAX: Lakesha Morales [phone]   ----------------------------------------
---------------------------------------  Name:   TY PORTER                  
 [facility]                    : 1950  Age/S: 69/M           [address] [place] [address]         Unit #: [id number]      Loc: G.5505       [facility]
X [address]                 Phys: Lakesha Madden NP                              
                 Acct: [id number] Dis Date:               Status: ADM IN      
                          PHONE #: [phone]     Exam Date:     2019   
 1602                   FAX #: [phone]     Reason: s/p right ij trialysis 
cath placement              EXAMS:                                              
CPT CODE:      [id number] XR CHEST 1 V                               [id number]       
                    1 VIEW CXR. PORTABLE EXAM  3:28 PM               HISTORY: 
Right IJ dialysis catheter placement.               COMPARISON: 2019 chest 
x-ray.               Diffuse mixed interstitial and airspace process throughout 
the right       mid to lower lung much worse today than 3 days ago.  Apparent   
   developing infiltrate left base.  Left lung otherwise clear.  No       ple
ural abnormality.  Cardiomediastinal silhouette and bony thorax       normal.  R
ight IJ dialysis catheter tip projects at the level of the       inferior 3rd SV
C no pneumothorax.                 IMPRESSION:                             1.  W
orsening right pulmonary infiltrate and developing left basal         infiltrate
.         2.  No pneumothorax following right IJ central line placement.        
          *******************END OF IMPRESSION*******************               
   SL: [id number]                                       ** Electronically Si
gned by CRAIG Benoit **           **             on 2019 at 1612  
          **                      Reported and signed by: Loyd Benoit M.D. 
          CC: Jonny Love MD; Lakesha Madden NP                            
                                                                     Technolo
gist: Lynne Barillas RT(R); RT Nikos(R)        Trnscrd Date/Time/B
y: 2019 () : By: Izzy           Orig Print D/T: S: 2019 (16
15)                         PAGE  1                       Signed Report         
                     ARTERIAL BLOOD [id number] 15:16:00* 



             Test Item    Value        Reference Range Interpretation Comments

 

             ARTERIAL BLOOD GAS PH (test code = PHA) 7.414        7.35-7.45    N

             

 

             ARTERIAL BLOOD GAS PCO2 (test code = PCO2A) 18.1 mmHg    35-45     

   LL            

 

             ARTERIAL BLOOD GAS PO2 (test code = PO2A) 90 mmHg            

 N             

 

             BICARBONATE TOTAL HCO3 (test code = HCO3) 11.6 mmol/L  22.0-26.0   

 L             

 

             BASE EXCESS (test code = URIEL) -13.0 mmol/L -4-4         L          

   

 

             ABG O2 SATURATION (test code = SATA) 98 %                N   

          

 

             FIO2 (test code = FIO2A) 80 %                                    

 

             ABG DELIVERY (test code = MATTY) Bipap                              

     

 

             ABG VENT RESP RATE (test code = RRA) 20 /MIN                       

         Performed by certified 

at  [facility]

 

             ABG TEMPERATURE (test code = TEMPA) 98.0 F                         

         

 

             ABG SITE (test code = SITEA) R Rad                                 

  

 

             PREDICTED AA GRADIENT (test code = AP) 142                         

            

 

             PREDICTED PO2 (test code = OP) 406                                 

    

 

             a/A RATIO (test code = RATIO) 0.16                                 

   

 

             TCO2 ARTERIAL (test code = TCO2A) 12                               

       

 

             A-A GRADIENT (test code = AAGRADE) 459                             

        





BASIC METABOLIC [id number] 08:20:00* 



             Test Item    Value        Reference Range Interpretation Comments

 

             SODIUM (test code = NA) 139 mEq/L    134-147      N             

 

             POTASSIUM (test code = K) 3.7 mEq/L    3.4-5.0      N             

 

             CHLORIDE (test code = CL) 110 mEq/L    100-108      H             

 

             CARBON DIOXIDE (test code = CO2) 14 mEq/L     21-33        L       

      

 

             ANION GAP (test code = GAP) 19           0-20         N            

 

 

             GLUCOSE (test code = GLU) 103 mg/dL           N             

 

             BLOOD UREA NITROGEN (test code = BUN) 97 mg/dL     7-18         H  

           

 

             GLOMERULAR FILTRATION RATE (test code = GFR) 8.7          70-80    

    L            Units of measure = 

ml/min/1.73 m2

 

             CREATININE (test code = CREAT) 6.4 mg/dL    0.6-1.3      H         

    

 

             CALCIUM (test code = CA) 8.3 mg/dL    8.0-10.5     N             





[id number] 08:20:00* 



             Test Item    Value        Reference Range Interpretation Comments

 

             PHOSPHOROUS (test code = PHOS) 7.2 mg/dL    2.5-4.9      H         

    





[id number] 08:20:00* 



             Test Item    Value        Reference Range Interpretation Comments

 

             MAGNESIUM (test code = MAG) 2.40 mg/dL   1.8-2.4      N            

 





CBC W/AUTO [id number] 07:22:00* 



             Test Item    Value        Reference Range Interpretation Comments

 

             WHITE BLOOD CELL (test code = WBC) 12.28 x10 3/uL 4.5-11.0     H   

          

 

             RED BLOOD CELL (test code = RBC) 2.45 x10 6/uL 4.00-5.60    L      

       

 

             HEMOGLOBIN (test code = HGB) 7.5 g/dL     12.5-16.9    L           

  

 

             HEMATOCRIT (test code = HCT) 23.1 %       37.5-50.7    L           

  

 

             MEAN CELL VOLUME (test code = MCV) 94.3 fL      81.0-99.0          

        

 

             MEAN CELL HGB (test code = MCH) 30.6 pg      27.0-33.0    N        

     

 

             MEAN CELL HGB CONCETRATION (test code = MCHC) 32.5 g/dL    33.0-37.

0    L             

 

             RED CELL DISTRIBUTION WIDTH CV (test code = RDW) 14.0 %       11.5-

14.5    N             

 

             RED CELL DISTRIBUTION WIDTH SD (test code = RDW-SD) 49.1 fL      37

.0-54.0    N             

 

             PLATELET COUNT (test code = PLT) 415 x10 3/uL 150-400      H       

      

 

             MEAN PLATELET VOLUME (test code = MPV) 9.2 fL       7.0-9.0      H 

            

 

             NEUTROPHIL % (test code = NT%) 87.0 %       56.0-77.0    H         

    

 

             IMMATURE GRANULOCYTE % (test code = IG%) 1.1 %        0.0-2.0      

N             

 

             LYMPHOCYTE % (test code = LY%) 5.5 %        14.0-32.0    L         

    

 

             MONOCYTE % (test code = MO%) 5.1 %        4.8-9.0      N           

  

 

             EOSINOPHIL % (test code = EO%) 1.1 %        0.3-3.7      N         

    

 

             BASOPHIL % (test code = BA%) 0.2 %        0.0-2.0      N           

  

 

             NUCLEATED RBC % (test code = NRBC%) 0.0 %        0-0          N    

         

 

             NEUTROPHIL # (test code = NT#) 10.69 x10 3/uL 2.0-7.6      H       

      

 

             IMMATURE GRANULOCYTE # (test code = IG#) 0.13 x10 3/uL 0.00-0.03   

 H             

 

             LYMPHOCYTE # (test code = LY#) 0.67 x10 3/uL 1.0-3.8      L        

     

 

             MONOCYTE # (test code = MO#) 0.63 x10 3/uL 0.1-0.8      N          

   

 

             EOSINOPHIL # (test code = EO#) 0.13 x10 3/uL 0.0-0.2      N        

     

 

             BASOPHIL # (test code = BA#) 0.03 x10 3/uL 0.0-0.2      N          

   

 

             NUCLEATED RBC # (test code = NRBC#) 0.00 x10 3/uL 0.0-0.1      N   

          

 

             MANUAL DIFF REQUIRED (test code = MDIFF) NO                        

              





ARTERIAL BLOOD [id number] 18:02:00* 



             Test Item    Value        Reference Range Interpretation Comments

 

             ARTERIAL BLOOD GAS PH (test code = PHA) 7.346        7.35-7.45    L

             

 

             ARTERIAL BLOOD GAS PCO2 (test code = PCO2A) 16.7 mmHg    35-45     

   LL            

 

             ARTERIAL BLOOD GAS PO2 (test code = PO2A) 85 mmHg            

 N             

 

             BICARBONATE TOTAL HCO3 (test code = HCO3) 9.1 mmol/L   22.0-26.0   

 L             

 

             BASE EXCESS (test code = URIEL) -17.0 mmol/L -4-4         L          

   

 

             ABG O2 SATURATION (test code = SATA) 96 %                N   

          

 

             ABG DELIVERY (test code = MATTY) Aero mask                          

    Performed by certified  at

 [facility]

 

             ABG TEMPERATURE (test code = TEMPA) 98.6 F                         

         

 

             ABG SITE (test code = SITEA) R Rad                                 

  

 

             TCO2 ARTERIAL (test code = TCO2A) 10                               

       





- US RETROPERITONEAL [id number] 13:34:00  Name: TY PORTER                
    [facility]                    : 1950 Age/S: 69  / M         
[address] [place] [address] Blvd         Unit #: [id number]     Loc:               
[place] [address]                 Phys: Matteo Camejo MD                    
                           Acct: [id number]  Dis Date:               Status: 
ADM IN                                  PHONE #: [phone]     Exam Date: 
2019  1322                     FAX #: [phone]      Reason: TEMO 
creatinine 6.6 new                              EXAMS:                          
                    CPT CODE:      [id number] US RETROPERITONEAL COM             
       [id number]                     - US RETROPERITONEAL COM: 2019 10:26 AM   
           CLINICAL HISTORY: DKA, elevated creatinine.               STUDY: 
Complete ultrasound of retroperitoneum.                 COMPARISON: 2013.  
                     FINDINGS:                     Kidney:       Elevated renal 
cortical echogenicity.  Right kidney measures 9.8 cm in       length.  Left 
kidney measures 9.7 cm in length.  Simple appearing       renal cysts 
bilaterally, measuring up to 1.8 cm on the left.  No       hydronephrosis or 
mass.                 Visualized portions of the abdominal aorta are unremar
kable.       IVC: Visualized portions are unremarkable.               Bladder: U
nremarkable.                 IMPRESSION:                   Elevated renal cortic
al echogenicity as often seen in the setting of         medical renal disease.  
No hydronephrosis.                    ** Electronically Signed by CRAIG quintero **            **            on 2019 at 7723            **            
         Reported and signed by: Beni Poole M.D.              CC: Jonny chan MD                                                                        
                                              Technologist: Angela Mandujano RDMS(B
R)(AB)                              Trnscb Date/Time: 2019 (6655) MunaKM
28                       Orig Print D/T: S: 2019 (1337)     Probe:        
              PAGE  1                       Signed Report                       
       BASIC METABOLIC [id number] 11:34:00* 



             Test Item    Value        Reference Range Interpretation Comments

 

             SODIUM (test code = NA) 138 mEq/L    134-147      N             

 

             POTASSIUM (test code = K) 4.3 mEq/L    3.4-5.0      N             

 

             CHLORIDE (test code = CL) 112 mEq/L    100-108      H             

 

             CARBON DIOXIDE (test code = CO2) 11 mEq/L     21-33        L       

      

 

             ANION GAP (test code = GAP) 19           0-20         N            

 

 

             GLUCOSE (test code = GLU) 100 mg/dL           N             

 

             BLOOD UREA NITROGEN (test code = BUN) 95 mg/dL     7-18         H  

           

 

             GLOMERULAR FILTRATION RATE (test code = GFR) 8.8          70-80    

    L            Units of measure = 

ml/min/1.73 m2

 

             CREATININE (test code = CREAT) 6.3 mg/dL    0.6-1.3      H         

    

 

             CALCIUM (test code = CA) 8.2 mg/dL    8.0-10.5     N             





[id number] 11:34:00* 



             Test Item    Value        Reference Range Interpretation Comments

 

             PHOSPHOROUS (test code = PHOS) 5.9 mg/dL    2.5-4.9      H         

    





[id number] 11:34:00* 



             Test Item    Value        Reference Range Interpretation Comments

 

             MAGNESIUM (test code = MAG) 2.50 mg/dL   1.8-2.4      H            

 





BASIC METABOLIC [id number] 11:33:00* 



             Test Item    Value        Reference Range Interpretation Comments

 

             SODIUM (test code = NA) 138 mEq/L    134-147      N             

 

             POTASSIUM (test code = K) 4.3 mEq/L    3.4-5.0      N             

 

             CHLORIDE (test code = CL) 112 mEq/L    100-108      H             

 

             CARBON DIOXIDE (test code = CO2) 11 mEq/L     21-33        L       

      

 

             ANION GAP (test code = GAP) 19           0-20         N            

 

 

             GLUCOSE (test code = GLU) 100 mg/dL           N             

 

             BLOOD UREA NITROGEN (test code = BUN) 95 mg/dL     7-18         H  

           

 

             GLOMERULAR FILTRATION RATE (test code = GFR)              70-80    

                  

 

             CREATININE (test code = CREAT)  mg/dL       0.6-1.3                

    

 

             CALCIUM (test code = CA) 8.2 mg/dL    8.0-10.5     N             





[id number] 11:33:00* 



             Test Item    Value        Reference Range Interpretation Comments

 

             PHOSPHOROUS (test code = PHOS)  mg/dL       2.5-4.9                

    





[id number] 11:33:00* 



             Test Item    Value        Reference Range Interpretation Comments

 

             MAGNESIUM (test code = MAG) 2.50 mg/dL   1.8-2.4      H            

 





CBC W/AUTO [id number] 11:22:00* 



             Test Item    Value        Reference Range Interpretation Comments

 

             WHITE BLOOD CELL (test code = WBC) 11.17 x10 3/uL 4.5-11.0     H   

          

 

             RED BLOOD CELL (test code = RBC) 2.39 x10 6/uL 4.00-5.60    L      

       

 

             HEMOGLOBIN (test code = HGB) 7.4 g/dL     12.5-16.9    L           

  

 

             HEMATOCRIT (test code = HCT) 23.9 %       37.5-50.7    L           

  

 

             MEAN CELL VOLUME (test code = MCV) 100.0 fL     81.0-99.0    H     

        

 

             MEAN CELL HGB (test code = MCH) 31.0 pg      27.0-33.0    N        

     

 

             MEAN CELL HGB CONCETRATION (test code = MCHC) 31.0 g/dL    33.0-37.

0    L             

 

             RED CELL DISTRIBUTION WIDTH CV (test code = RDW) 14.2 %       11.5-

14.5    N             

 

             RED CELL DISTRIBUTION WIDTH SD (test code = RDW-SD) 51.9 fL      37

.0-54.0    N             

 

             PLATELET COUNT (test code = PLT) 367 x10 3/uL 150-400      N       

      

 

             MEAN PLATELET VOLUME (test code = MPV) 8.9 fL       7.0-9.0      N 

            

 

             NEUTROPHIL % (test code = NT%) 86.8 %       56.0-77.0    H         

    

 

             IMMATURE GRANULOCYTE % (test code = IG%) 2.1 %        0.0-2.0      

H             

 

             LYMPHOCYTE % (test code = LY%) 4.8 %        14.0-32.0    L         

    

 

             MONOCYTE % (test code = MO%) 5.8 %        4.8-9.0      N           

  

 

             EOSINOPHIL % (test code = EO%) 0.2 %        0.3-3.7      L         

    

 

             BASOPHIL % (test code = BA%) 0.3 %        0.0-2.0      N           

  

 

             NUCLEATED RBC % (test code = NRBC%) 0.0 %        0-0          N    

         

 

             NEUTROPHIL # (test code = NT#) 9.70 x10 3/uL 2.0-7.6      H        

     

 

             IMMATURE GRANULOCYTE # (test code = IG#) 0.23 x10 3/uL 0.00-0.03   

 H             

 

             LYMPHOCYTE # (test code = LY#) 0.54 x10 3/uL 1.0-3.8      L        

     

 

             MONOCYTE # (test code = MO#) 0.65 x10 3/uL 0.1-0.8      N          

   

 

             EOSINOPHIL # (test code = EO#) 0.02 x10 3/uL 0.0-0.2      N        

     

 

             BASOPHIL # (test code = BA#) 0.03 x10 3/uL 0.0-0.2      N          

   

 

             NUCLEATED RBC # (test code = NRBC#) 0.00 x10 3/uL 0.0-0.1      N   

          

 

             MANUAL DIFF REQUIRED (test code = MDIFF) NO                        

              





B-TYPE NATRIURETIC [id number] 13:46:00* 



             Test Item    Value        Reference Range Interpretation Comments

 

             B-TYPE NATRIURETIC PEPTIDE (test code = BNP) 100.7 PG/ML  0-100    

    H             





BASIC METABOLIC [id number] 13:19:00* 



             Test Item    Value        Reference Range Interpretation Comments

 

             SODIUM (test code = NA) 138 mEq/L    134-147      N             

 

             POTASSIUM (test code = K) 4.5 mEq/L    3.4-5.0      N             

 

             CHLORIDE (test code = CL) 109 mEq/L    100-108      H             

 

             CARBON DIOXIDE (test code = CO2) 15 mEq/L     21-33        L       

      

 

             ANION GAP (test code = GAP) 19           0-20         N            

 

 

             GLUCOSE (test code = GLU) 119 mg/dL           H             

 

             BLOOD UREA NITROGEN (test code = BUN) 95 mg/dL     7-18         H  

           

 

             GLOMERULAR FILTRATION RATE (test code = GFR) 8.4          70-80    

    L            Units of measure = 

ml/min/1.73 m2

 

             CREATININE (test code = CREAT) 6.6 mg/dL    0.6-1.3      H         

    

 

             CALCIUM (test code = CA) 8.4 mg/dL    8.0-10.5     N             





[id number] 13:19:00* 



             Test Item    Value        Reference Range Interpretation Comments

 

             TROPONIN-I (test code = TROPI) < 0.015 ng/mL 0.000-0.045  N        

    Negative:            

<= 0.045 Positive:             >= 0.046 Correlation with serial results, other 
cardiac markers andclinical findings is necessary to determine the 
clinicalsignificance of this result. Results using different methodologies 
should not be comparedto one another as quantitative results may vary by method.





[id number] 13:14:00* 



             Test Item    Value        Reference Range Interpretation Comments

 

             D-DIMER (test code = DDIMER) 555 ng/mlFEU <=500        HH          

  THROMBOSIS AND/OR PULMONARY 

EMBOLISM AND THE CLINICAL CUT- OFF VALUE FOR EXCLUSION (500 ng/mL FEU) OF THESE 
CONDITIONSIS VALIDATED BY THE  OF THE METHOD. A NEGATIVE D-DIMER 
RESULT WHEN COMBINED WITH A CLINICALASSESSMENT OF LOW PRETEST PROBABILITY HAS 
BEEN SHOWN TO HAVEA HIGH NEGATIVE PREDICTIVE VALUE OF DVT OR PE. D-DIMER VALUES 
>500 ng/mL FEU ARE NOT DIAGNOSTIC FOR DVT, PEor DIC WITHOUT OTHER CONFIRMATORY 
TESTS AND APPROPRIATECLINICAL EUALUATIONS.





LACTIC [id number] 13:11:00* 



             Test Item    Value        Reference Range Interpretation Comments

 

             LACTIC ACID (test code = LACT) 1.2 mmol/L   0.4-1.9      N         

    





URINALYSIS [id number] 13:09:00* 



             Test Item    Value        Reference Range Interpretation Comments

 

             UA COLOR (test code = COLU) YELLOW       YEL/STRAW                 

 

 

             UA APPEARANCE (test code = APPU) CLEAR        CLEAR                

      

 

             UA GLUCOSE DIPSTICK (test code = DGLUU) 1+           NEGATIVE     A

             

 

             UA BILIRUBIN DIPSTICK (test code = BILU) NEGATIVE     NEGATIVE     

              

 

             UA KETONE DIPSTICK (test code = KETU) NEGATIVE     NEGATIVE        

           

 

             UA SPECIFIC GRAVITY (test code = SGU) 1.013        1.005-1.030  N  

           

 

             UA BLOOD DIPSTICK (test code = WHITNEY) 2+           NEGATIVE     A    

         

 

             UA PH DIPSTICK (test code = ANISHA) 5.0          5.0-7.0      N       

      

 

             UA PROTEIN DIPSTICK (test code = PROU) 2+           NEGATIVE     A 

            

 

             UA UROBILINIOGEN DIPSTICK (test code = URO) 0.2 mg/dL    0.2-1.0   

                 

 

             UA NITRITE DIPSTICK (test code = JAMES) NEGATIVE     NEGATIVE       

            

 

             UA LEUKOCYTE ESTERASE DIPSTICK (test code = LEUU) NEGATIVE     NEGA

TIVE                   

 

             UA RBC (test code = RBCU) 4-10 RBC/HPF 0-3                        

 

             UA WBC NO REFLEX (test code = WBCUCL) 0-3 WBC/HPF  0-3             

           

 

             UA BACTERIA (test code = BACU) TRACE /HPF   NONE SEEN              

    

 

             UA SQUAMOUS CELLS (test code = SQU) 0-5 /HPF     NONE SEEN         

         

 

             UA MUCUS (test code = MUCU) TRACE /LPF   NONE SEEN                 

 





CBC W/AUTO [id number] 13:01:00* 



             Test Item    Value        Reference Range Interpretation Comments

 

             WHITE BLOOD CELL (test code = WBC) 12.96 x10 3/uL 4.5-11.0     H   

          

 

             RED BLOOD CELL (test code = RBC) 2.94 x10 6/uL 4.00-5.60    L      

       

 

             HEMOGLOBIN (test code = HGB) 9.0 g/dL     12.5-16.9    L           

  

 

             HEMATOCRIT (test code = HCT) 28.0 %       37.5-50.7    L           

  

 

             MEAN CELL VOLUME (test code = MCV) 95.2 fL      81.0-99.0    N     

        

 

             MEAN CELL HGB (test code = MCH) 30.6 pg      27.0-33.0    N        

     

 

             MEAN CELL HGB CONCETRATION (test code = MCHC) 32.1 g/dL    33.0-37.

0    L             

 

             RED CELL DISTRIBUTION WIDTH CV (test code = RDW) 13.7 %       11.5-

14.5    N             

 

             RED CELL DISTRIBUTION WIDTH SD (test code = RDW-SD) 48.0 fL      37

.0-54.0    N             

 

             PLATELET COUNT (test code = PLT) 480 x10 3/uL 150-400      H       

      

 

             MEAN PLATELET VOLUME (test code = MPV) 8.9 fL       7.0-9.0      N 

            

 

             NEUTROPHIL % (test code = NT%) 84.8 %       56.0-77.0    H         

    

 

             IMMATURE GRANULOCYTE % (test code = IG%) 2.2 %        0.0-2.0      

H             

 

             LYMPHOCYTE % (test code = LY%) 4.8 %        14.0-32.0    L         

    

 

             MONOCYTE % (test code = MO%) 7.8 %        4.8-9.0      N           

  

 

             EOSINOPHIL % (test code = EO%) 0.1 %        0.3-3.7      L         

    

 

             BASOPHIL % (test code = BA%) 0.3 %        0.0-2.0      N           

  

 

             NUCLEATED RBC % (test code = NRBC%) 0.0 %        0-0          N    

         

 

             NEUTROPHIL # (test code = NT#) 11.00 x10 3/uL 2.0-7.6      H       

      

 

             IMMATURE GRANULOCYTE # (test code = IG#) 0.28 x10 3/uL 0.00-0.03   

 H             

 

             LYMPHOCYTE # (test code = LY#) 0.62 x10 3/uL 1.0-3.8      L        

     

 

             MONOCYTE # (test code = MO#) 1.01 x10 3/uL 0.1-0.8      H          

   

 

             EOSINOPHIL # (test code = EO#) 0.01 x10 3/uL 0.0-0.2      N        

     

 

             BASOPHIL # (test code = BA#) 0.04 x10 3/uL 0.0-0.2      N          

   

 

             NUCLEATED RBC # (test code = NRBC#) 0.00 x10 3/uL 0.0-0.1      N   

          

 

             MANUAL DIFF REQUIRED (test code = MDIFF) NO                        

              





- XR CHEST 1 -02-28 11:52:00                                               
   [place]:   St: PRE----------
---------------------------------------------------------------------  Name:   TY DOUGLAS ZANE                    [facility]                    : [birth year]  Age/S: 69/M           [address] [place] [address]         Unit #: [id number]    
 Loc: [place] [address]                 Phys: Stefan Gayle MD   
                                              Acct: [id number] Dis Date:      
        Status: PRE ER                                 PHONE #: [phone]    
Exam Date:     2019     1149                   FAX #: [phone]     
Reason: Chest Pain                                         EXAMS:               
                               CPT CODE:      [id number] XR CHEST 1 V            
                  435[address]                    PROCEDURE: CHEST SINGLE VIEW         
     INDICATION: Chest pain, shortness of breath, fatigue               COMP
ARISON: 2016               FINDINGS: Moderate infiltrate in the right lower 
lobe is noted.  Left       lung is clear.  Heart size is normal.  Aorta is withi
n normal limits.        No vascular congestion is present.                 IMPRE
SSION:          Right lower lobe pneumonia.                   SL:  [id number] 
                ** Electronically Signed by CRAIG Augustin **          
**              on 2019 at 1152              **                      Repor
dacia and signed by: Jaden Augustin M.D.                         CC:          
                                                                                
                                             Technologist: Brook Gomez, RT(R)  
                                 Trnscdoris Date/Time/By: 2019 (0735) : By: 
[email or web address]          Orig Print D/T: S: 2019 (7648)                        
PAGE  1                       Signed Report

## 2020-05-18 NOTE — OPERATIVE REPORT
DATE OF PROCEDURE:  05/18/2020

 

SURGEON:  Sha Malik MD

 

PREOPERATIVE DIAGNOSES:  End-stage renal failure, need for permanent dialysis access,

hypertension. 

 

POSTOPERATIVE DIAGNOSES:  End-stage renal failure, need for permanent dialysis access,

hypertension. 

 

OPERATIVE PROCEDURE:  Creation left brachial artery to basilic vein AV fistula with

transposition of fistula to subcutaneous location. 

 

ASSISTANT:  Nursing.

 

ANESTHESIA:  General endotracheal.

 

INDICATIONS:  This is a 69-year-old man with end-stage renal failure, who requires

permanent dialysis access.  Noninvasive mapping suggested that a left upper extremity

fistula would be his best option.  Prior to surgery, I described the operation to the

patient.  He came to the operating room by himself because of the COVID-19 restrictions.

 I told him that the risks of surgery would include death, bleeding, infection, heart

attack, stroke, pneumonia, prolonged ICU stay, mechanical ventilation, tracheostomy,

permanent hand/limb muscle or nerve damage, hand/limb chronic pain, hand/limb

amputation, a 10% to 15% chance of the fistula might not mature appropriately and

require subsequent revision, further surgery, replacement, etc.  The patient stated he

understood, no further questions and wanted to proceed. 

 

FINDINGS:  Uneventful creation of left brachial artery to cephalic vein AV fistula.

Excellent thrill and bruit from mid forearm to shoulder at conclusion of surgery.

Radial artery pulse was palpable prior to and at completion of surgery.  The median

antecubital vein was divided. 

 

DESCRIPTION OF PROCEDURE:  The patient was taken to the operative room on May 18, 2020,

and placed supine upon the operating table.  General endotracheal anesthesia was

smoothly induced.  The left upper extremity was sterilely prepped and draped in the

usual fashion using alcohol prewash and Betadine scrub and solution.  A longitudinal

incision was made distal to the antebrachial crease.  The cephalic vein was isolated and

controlled.  The  vein was divided.  There was excellent backbleeding.  The

valve in the forearm cephalic vein was disrupted.  There was an excellent backbleeding.

A 3.5 mm dilator passed easily into the upper arm and into the forearm as well.  The

brachial, radial, and ulnar arteries were isolated and controlled.  The patient was

systemically heparinized using 1 mg/kg of heparin intravenously.  The brachial, radial,

and ulnar arteries were occluded.  A longitudinal arteriotomy was created on the distal

brachial artery.  There was excellent arterial inflow and backbleeding.  An end-to-side

anastomosis of  vein to brachial artery was performed using a running 7-0

Prolene with knots done in two locations to prevent pursestring.  Prior to completing

the anastomosis, a 2.0 mm dilator passed easily proximally and distally into the

brachial, radial, and ulnar arteries.  A 3.5 mm dilator passed easily into the outflow

 vein.  The anastomosis was completed.  Occluding instruments were removed.

The fistula filled promptly.  There was an excellent palpable thrill.  A half dose of

protamine was administered.  There was excellent hemostasis.  The wound was closed in

layers using absorbable suture.  Local anesthesia was infiltrated.  Sterile dressings

were applied. 

 

Sponge, instrument, and needle counts were correct prior to and after wound closure.

Independent search of the operative field by both operating surgeons and nurse revealed

no 

retained instruments or sponges.  The patient tolerated the procedure well, had local

anesthesia infiltrated prior to completing the procedure and was taken to recovery area

in stable condition. 

 

 

 

 

______________________________

MD KYRA Haile/JERONIMO

D:  05/18/2020 10:31:02

T:  05/18/2020 15:30:37

Job #:  [id number]

## 2020-06-22 ENCOUNTER — HOSPITAL ENCOUNTER (OUTPATIENT)
Dept: HOSPITAL 88 - CT | Age: 70
End: 2020-06-22
Attending: INTERNAL MEDICINE
Payer: MEDICARE

## 2020-06-22 DIAGNOSIS — J18.9: Primary | ICD-10-CM

## 2020-06-22 PROCEDURE — 71250 CT THORAX DX C-: CPT

## 2020-06-22 NOTE — DIAGNOSTIC IMAGING REPORT
CT of the chest.



Comparison: 2/28/2020



Clinical History:  COPD. Pneumonia. Productive cough.



Technique: Helical CT scan of the chest was performed from just above the

thoracic inlet through the adrenal glands.  Intravenous contrast administration

was not utilized.  Coronal and sagittal reconstructions were generated from the

raw data.  Multiple images were submitted for interpretation.



This exam was performed according to our departmental dose-optimization program

which includes automated exposure control, adjustment of the mA and/or kV

according to patient size 



Discussion:



Lung fields: There is presence of advanced primarily centrilobular emphysema

involving both upper lobes of the lungs with cyst formation, scarring,

compression on the intervening lung parenchyma and possibly a component of

airspace and interstitial disease which is unchanged improved compared with the

previous exam. The previously seen right lower lobe spiculated opacity with air

bronchograms has resolved completely on the current exam. A few calcified

granulomas scattered through the lung fields are noted again. .

Central airways:  Central airways show increased caliber, mostly in the upper

lobes suggestive of traction bronchiectasis.

Pleural spaces and pleura:  No pleural effusion, plaques or pneumothorax.

Pulmonary noble: Normal

Mediastinum:  There is presence of multiple bilateral lymph nodes in the right

paratracheal region, pretracheal region, subcarinal region. The lymph nodes

have uniform soft tissue density. The largest of these lymph nodes is in the

right paratracheal location just above the tracheobronchial angle and measures

approximately 10 mm in the short axis. These are benign by size criteria.

Noncontrast enhanced CT has poor sensitivity in differentiating benign from

malignant lymph nodes. These have not changed significantly compared with the

previous exam.

Cardiac chambers and pericardium:  There is cardiomegaly. There is coronary

artery calcification. There is a right IJ route [employer] that

terminates with the tip in the right atrium.

Systemic great vessels:  The ascending thoracic aorta measures approximately 40

mm at the level of the left main pulmonary artery. This is consistent with a

descending thoracic aortic aneurysm. There is patchy atherosclerotic

calcification of aorta.

Central pulmonary vessels:  The main pulmonary artery measures 35 mm. This is

dilated.

Thyroid:  Unremarkable

Lymph nodes:  As described above

Azygos vein:  Unremarkable

The esophagus: Normal. 

Thoracic duct: Unremarkable

Osseous structures: Degenerative changes. Multiple old healed right rib

fractures.

Upper abdomen:  Unremarkable

Body wall: Unremarkable

Breasts: Unremarkable

Axilla: Unremarkable

Lower neck: Unremarkable.

Impression:

Advanced pulmonary emphysema of the centrilobular type with cyst formation,

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 This

is a chronic finding.



The previously seen right lower lobe spiculated nodule has resolved and likely

represents infection/inflammation.



No acute disease.



Ascending thoracic aortic aneurysm. Dilated central pulmonary vessels

suggestive of pulmonary artery hypertension.



Signed by: Zeyad Pelayo MD on 6/22/2020 3:13 PM